# Patient Record
Sex: MALE | Race: WHITE
[De-identification: names, ages, dates, MRNs, and addresses within clinical notes are randomized per-mention and may not be internally consistent; named-entity substitution may affect disease eponyms.]

---

## 2017-01-31 ENCOUNTER — HOSPITAL ENCOUNTER (OUTPATIENT)
Dept: HOSPITAL 47 - LABWHC1 | Age: 52
Discharge: HOME | End: 2017-01-31
Payer: COMMERCIAL

## 2017-01-31 DIAGNOSIS — E29.1: Primary | ICD-10-CM

## 2017-01-31 LAB
CH: 30.1
CHCM: 33.4
ERYTHROCYTE [DISTWIDTH] IN BLOOD BY AUTOMATED COUNT: 5.08 M/UL (ref 4.3–5.9)
ERYTHROCYTE [DISTWIDTH] IN BLOOD: 13 % (ref 11.5–15.5)
HCT VFR BLD AUTO: 46 % (ref 39–53)
HDW: 2.63
HGB BLD-MCNC: 14.7 GM/DL (ref 13–17.5)
MCH RBC QN AUTO: 28.9 PG (ref 25–35)
MCHC RBC AUTO-ENTMCNC: 31.9 G/DL (ref 31–37)
MCV RBC AUTO: 90.6 FL (ref 80–100)
WBC # BLD AUTO: 7.2 K/UL (ref 3.8–10.6)

## 2017-01-31 PROCEDURE — 85027 COMPLETE CBC AUTOMATED: CPT

## 2017-01-31 PROCEDURE — 84403 ASSAY OF TOTAL TESTOSTERONE: CPT

## 2017-01-31 PROCEDURE — 36415 COLL VENOUS BLD VENIPUNCTURE: CPT

## 2017-06-29 ENCOUNTER — HOSPITAL ENCOUNTER (OUTPATIENT)
Dept: HOSPITAL 47 - LABWHC1 | Age: 52
Discharge: HOME | End: 2017-06-29
Payer: COMMERCIAL

## 2017-06-29 DIAGNOSIS — E29.1: Primary | ICD-10-CM

## 2017-06-29 LAB
CH: 30.2
CHCM: 34.3
ERYTHROCYTE [DISTWIDTH] IN BLOOD BY AUTOMATED COUNT: 4.92 M/UL (ref 4.3–5.9)
ERYTHROCYTE [DISTWIDTH] IN BLOOD: 13.5 % (ref 11.5–15.5)
HCT VFR BLD AUTO: 43.6 % (ref 39–53)
HDW: 2.63
HGB BLD-MCNC: 15 GM/DL (ref 13–17.5)
MCH RBC QN AUTO: 30.4 PG (ref 25–35)
MCHC RBC AUTO-ENTMCNC: 34.3 G/DL (ref 31–37)
MCV RBC AUTO: 88.7 FL (ref 80–100)
PSA SERPL-MCNC: 0.61 NG/ML (ref 0–4)
WBC # BLD AUTO: 4.9 K/UL (ref 3.8–10.6)

## 2017-06-29 PROCEDURE — 36415 COLL VENOUS BLD VENIPUNCTURE: CPT

## 2017-06-29 PROCEDURE — 85027 COMPLETE CBC AUTOMATED: CPT

## 2017-06-29 PROCEDURE — 84403 ASSAY OF TOTAL TESTOSTERONE: CPT

## 2017-06-29 PROCEDURE — 84153 ASSAY OF PSA TOTAL: CPT

## 2018-04-16 ENCOUNTER — HOSPITAL ENCOUNTER (OUTPATIENT)
Dept: HOSPITAL 47 - LABWHC1 | Age: 53
Discharge: HOME | End: 2018-04-16
Payer: COMMERCIAL

## 2018-04-16 DIAGNOSIS — E29.1: Primary | ICD-10-CM

## 2018-04-16 LAB
ERYTHROCYTE [DISTWIDTH] IN BLOOD BY AUTOMATED COUNT: 5.04 M/UL (ref 4.3–5.9)
ERYTHROCYTE [DISTWIDTH] IN BLOOD: 13.1 % (ref 11.5–15.5)
HCT VFR BLD AUTO: 45.4 % (ref 39–53)
HGB BLD-MCNC: 15.5 GM/DL (ref 13–17.5)
MCH RBC QN AUTO: 30.8 PG (ref 25–35)
MCHC RBC AUTO-ENTMCNC: 34.1 G/DL (ref 31–37)
MCV RBC AUTO: 90.2 FL (ref 80–100)
PLATELET # BLD AUTO: 245 K/UL (ref 150–450)
WBC # BLD AUTO: 6.9 K/UL (ref 3.8–10.6)

## 2018-04-16 PROCEDURE — 84153 ASSAY OF PSA TOTAL: CPT

## 2018-04-16 PROCEDURE — 85027 COMPLETE CBC AUTOMATED: CPT

## 2018-04-16 PROCEDURE — 36415 COLL VENOUS BLD VENIPUNCTURE: CPT

## 2018-04-16 PROCEDURE — 84403 ASSAY OF TOTAL TESTOSTERONE: CPT

## 2018-07-19 ENCOUNTER — HOSPITAL ENCOUNTER (OUTPATIENT)
Dept: HOSPITAL 47 - LABWHC1 | Age: 53
Discharge: HOME | End: 2018-07-19
Payer: COMMERCIAL

## 2018-07-19 DIAGNOSIS — E29.1: Primary | ICD-10-CM

## 2018-07-19 LAB
ERYTHROCYTE [DISTWIDTH] IN BLOOD BY AUTOMATED COUNT: 4.37 M/UL (ref 4.3–5.9)
ERYTHROCYTE [DISTWIDTH] IN BLOOD: 14.4 % (ref 11.5–15.5)
HCT VFR BLD AUTO: 40.2 % (ref 39–53)
HGB BLD-MCNC: 13.4 GM/DL (ref 13–17.5)
MCH RBC QN AUTO: 30.6 PG (ref 25–35)
MCHC RBC AUTO-ENTMCNC: 33.3 G/DL (ref 31–37)
MCV RBC AUTO: 92.1 FL (ref 80–100)
PLATELET # BLD AUTO: 194 K/UL (ref 150–450)
WBC # BLD AUTO: 5 K/UL (ref 3.8–10.6)

## 2018-07-19 PROCEDURE — 85027 COMPLETE CBC AUTOMATED: CPT

## 2018-07-19 PROCEDURE — 36415 COLL VENOUS BLD VENIPUNCTURE: CPT

## 2018-07-19 PROCEDURE — 84403 ASSAY OF TOTAL TESTOSTERONE: CPT

## 2018-07-19 PROCEDURE — 84153 ASSAY OF PSA TOTAL: CPT

## 2018-12-11 ENCOUNTER — HOSPITAL ENCOUNTER (OUTPATIENT)
Dept: HOSPITAL 47 - LABWHC1 | Age: 53
Discharge: HOME | End: 2018-12-11
Payer: COMMERCIAL

## 2018-12-11 DIAGNOSIS — E29.1: Primary | ICD-10-CM

## 2018-12-11 LAB
ERYTHROCYTE [DISTWIDTH] IN BLOOD BY AUTOMATED COUNT: 5.17 M/UL (ref 4.3–5.9)
ERYTHROCYTE [DISTWIDTH] IN BLOOD: 13.4 % (ref 11.5–15.5)
HCT VFR BLD AUTO: 48 % (ref 39–53)
HGB BLD-MCNC: 15.3 GM/DL (ref 13–17.5)
MCH RBC QN AUTO: 29.5 PG (ref 25–35)
MCHC RBC AUTO-ENTMCNC: 31.8 G/DL (ref 31–37)
MCV RBC AUTO: 92.7 FL (ref 80–100)
PLATELET # BLD AUTO: 245 K/UL (ref 150–450)
WBC # BLD AUTO: 8.1 K/UL (ref 3.8–10.6)

## 2018-12-11 PROCEDURE — 84403 ASSAY OF TOTAL TESTOSTERONE: CPT

## 2018-12-11 PROCEDURE — 85027 COMPLETE CBC AUTOMATED: CPT

## 2018-12-11 PROCEDURE — 36415 COLL VENOUS BLD VENIPUNCTURE: CPT

## 2019-03-28 ENCOUNTER — HOSPITAL ENCOUNTER (OUTPATIENT)
Dept: HOSPITAL 47 - LABWHC1 | Age: 54
Discharge: HOME | End: 2019-03-28
Attending: INTERNAL MEDICINE
Payer: COMMERCIAL

## 2019-03-28 DIAGNOSIS — E29.1: Primary | ICD-10-CM

## 2019-03-28 LAB
ERYTHROCYTE [DISTWIDTH] IN BLOOD BY AUTOMATED COUNT: 6.11 M/UL (ref 4.3–5.9)
ERYTHROCYTE [DISTWIDTH] IN BLOOD: 14.5 % (ref 11.5–15.5)
HCT VFR BLD AUTO: 53.2 % (ref 39–53)
HGB BLD-MCNC: 16.9 GM/DL (ref 13–17.5)
MCH RBC QN AUTO: 27.6 PG (ref 25–35)
MCHC RBC AUTO-ENTMCNC: 31.7 G/DL (ref 31–37)
MCV RBC AUTO: 87 FL (ref 80–100)
PLATELET # BLD AUTO: 267 K/UL (ref 150–450)
WBC # BLD AUTO: 7 K/UL (ref 3.8–10.6)

## 2019-03-28 PROCEDURE — 84403 ASSAY OF TOTAL TESTOSTERONE: CPT

## 2019-03-28 PROCEDURE — 36415 COLL VENOUS BLD VENIPUNCTURE: CPT

## 2019-03-28 PROCEDURE — 85027 COMPLETE CBC AUTOMATED: CPT

## 2021-07-06 ENCOUNTER — HOSPITAL ENCOUNTER (OUTPATIENT)
Dept: HOSPITAL 47 - LABWHC1 | Age: 56
Discharge: HOME | End: 2021-07-06
Attending: FAMILY MEDICINE
Payer: COMMERCIAL

## 2021-07-06 DIAGNOSIS — E29.1: ICD-10-CM

## 2021-07-06 DIAGNOSIS — Z00.00: Primary | ICD-10-CM

## 2021-07-06 DIAGNOSIS — Z12.5: ICD-10-CM

## 2021-07-06 DIAGNOSIS — E55.9: ICD-10-CM

## 2021-07-06 LAB
BASOPHILS # BLD AUTO: 0.08 X 10*3/UL (ref 0–0.1)
BASOPHILS NFR BLD AUTO: 1.2 %
EOSINOPHIL # BLD AUTO: 0.25 X 10*3/UL (ref 0.04–0.35)
EOSINOPHIL NFR BLD AUTO: 3.7 %
ERYTHROCYTE [DISTWIDTH] IN BLOOD BY AUTOMATED COUNT: 4.35 X 10*6/UL (ref 4.4–5.6)
ERYTHROCYTE [DISTWIDTH] IN BLOOD: 13.7 % (ref 11.5–14.5)
HCT VFR BLD AUTO: 39.6 % (ref 39.6–50)
HGB BLD-MCNC: 13.3 G/DL (ref 13–17)
LYMPHOCYTES # SPEC AUTO: 2.72 X 10*3/UL (ref 0.9–5)
LYMPHOCYTES NFR SPEC AUTO: 40.1 %
MCH RBC QN AUTO: 30.6 PG (ref 27–32)
MCHC RBC AUTO-ENTMCNC: 33.6 G/DL (ref 32–37)
MCV RBC AUTO: 91 FL (ref 80–97)
MONOCYTES # BLD AUTO: 0.53 X 10*3/UL (ref 0.2–1)
MONOCYTES NFR BLD AUTO: 7.8 %
NEUTROPHILS # BLD AUTO: 3.19 X 10*3/UL (ref 1.8–7.7)
NEUTROPHILS NFR BLD AUTO: 47.1 %
PLATELET # BLD AUTO: 193 X 10*3/UL (ref 140–440)
WBC # BLD AUTO: 6.78 X 10*3/UL (ref 4.5–10)

## 2021-07-06 PROCEDURE — 82306 VITAMIN D 25 HYDROXY: CPT

## 2021-07-06 PROCEDURE — 84153 ASSAY OF PSA TOTAL: CPT

## 2021-07-06 PROCEDURE — 84443 ASSAY THYROID STIM HORMONE: CPT

## 2021-07-06 PROCEDURE — 80053 COMPREHEN METABOLIC PANEL: CPT

## 2021-07-06 PROCEDURE — 36415 COLL VENOUS BLD VENIPUNCTURE: CPT

## 2021-07-06 PROCEDURE — 80061 LIPID PANEL: CPT

## 2021-07-06 PROCEDURE — 85025 COMPLETE CBC W/AUTO DIFF WBC: CPT

## 2021-07-07 LAB
ALBUMIN SERPL-MCNC: 4.2 G/DL (ref 3.8–4.9)
ALBUMIN/GLOB SERPL: 1.62 G/DL (ref 1.6–3.17)
ALP SERPL-CCNC: 86 U/L (ref 41–126)
ALT SERPL-CCNC: 20 U/L (ref 10–49)
ANION GAP SERPL CALC-SCNC: 9.7 MMOL/L (ref 4–12)
AST SERPL-CCNC: 20 U/L (ref 14–35)
BUN SERPL-SCNC: 16 MG/DL (ref 9–27)
BUN/CREAT SERPL: 13.33 RATIO (ref 12–20)
CALCIUM SPEC-MCNC: 8.6 MG/DL (ref 8.7–10.3)
CHLORIDE SERPL-SCNC: 110 MMOL/L (ref 96–109)
CHOLEST SERPL-MCNC: 162 MG/DL (ref 0–200)
CO2 SERPL-SCNC: 21.3 MMOL/L (ref 21.6–31.8)
GLOBULIN SER CALC-MCNC: 2.6 G/DL (ref 1.6–3.3)
GLUCOSE SERPL-MCNC: 88 MG/DL (ref 70–110)
HDLC SERPL-MCNC: 57 MG/DL (ref 40–60)
LDLC SERPL CALC-MCNC: 86.4 MG/DL (ref 0–131)
POTASSIUM SERPL-SCNC: 4 MMOL/L (ref 3.5–5.5)
PROT SERPL-MCNC: 6.8 G/DL (ref 6.2–8.2)
PSA SERPL-MCNC: 0.2 NG/ML (ref 0–3.5)
SODIUM SERPL-SCNC: 141 MMOL/L (ref 135–145)
TRIGL SERPL-MCNC: 93 MG/DL (ref 0–149)
VLDLC SERPL CALC-MCNC: 18.6 MG/DL (ref 5–40)

## 2021-09-21 ENCOUNTER — HOSPITAL ENCOUNTER (OUTPATIENT)
Dept: HOSPITAL 47 - LABWHC1 | Age: 56
Discharge: HOME | End: 2021-09-21
Attending: FAMILY MEDICINE
Payer: COMMERCIAL

## 2021-09-21 DIAGNOSIS — E03.9: Primary | ICD-10-CM

## 2021-09-21 PROCEDURE — 36415 COLL VENOUS BLD VENIPUNCTURE: CPT

## 2021-09-21 PROCEDURE — 84443 ASSAY THYROID STIM HORMONE: CPT

## 2021-12-07 ENCOUNTER — HOSPITAL ENCOUNTER (EMERGENCY)
Dept: HOSPITAL 47 - EC | Age: 56
LOS: 1 days | Discharge: HOME | End: 2021-12-08
Payer: COMMERCIAL

## 2021-12-07 DIAGNOSIS — U07.1: Primary | ICD-10-CM

## 2021-12-07 PROCEDURE — 99284 EMERGENCY DEPT VISIT MOD MDM: CPT

## 2021-12-07 PROCEDURE — 71046 X-RAY EXAM CHEST 2 VIEWS: CPT

## 2021-12-07 PROCEDURE — 87635 SARS-COV-2 COVID-19 AMP PRB: CPT

## 2021-12-08 ENCOUNTER — HOSPITAL ENCOUNTER (INPATIENT)
Dept: HOSPITAL 47 - EC | Age: 56
LOS: 19 days | DRG: 207 | End: 2021-12-27
Attending: INTERNAL MEDICINE | Admitting: INTERNAL MEDICINE
Payer: COMMERCIAL

## 2021-12-08 VITALS — BODY MASS INDEX: 37.3 KG/M2

## 2021-12-08 DIAGNOSIS — F32.A: ICD-10-CM

## 2021-12-08 DIAGNOSIS — Z98.890: ICD-10-CM

## 2021-12-08 DIAGNOSIS — Z82.49: ICD-10-CM

## 2021-12-08 DIAGNOSIS — T38.0X5A: ICD-10-CM

## 2021-12-08 DIAGNOSIS — Z66: ICD-10-CM

## 2021-12-08 DIAGNOSIS — E87.5: ICD-10-CM

## 2021-12-08 DIAGNOSIS — Z82.5: ICD-10-CM

## 2021-12-08 DIAGNOSIS — I48.0: ICD-10-CM

## 2021-12-08 DIAGNOSIS — N17.9: ICD-10-CM

## 2021-12-08 DIAGNOSIS — Z79.890: ICD-10-CM

## 2021-12-08 DIAGNOSIS — J12.82: ICD-10-CM

## 2021-12-08 DIAGNOSIS — J80: ICD-10-CM

## 2021-12-08 DIAGNOSIS — E66.9: ICD-10-CM

## 2021-12-08 DIAGNOSIS — J15.9: ICD-10-CM

## 2021-12-08 DIAGNOSIS — R29.810: ICD-10-CM

## 2021-12-08 DIAGNOSIS — I12.9: ICD-10-CM

## 2021-12-08 DIAGNOSIS — Z86.69: ICD-10-CM

## 2021-12-08 DIAGNOSIS — J98.2: ICD-10-CM

## 2021-12-08 DIAGNOSIS — G47.00: ICD-10-CM

## 2021-12-08 DIAGNOSIS — R73.9: ICD-10-CM

## 2021-12-08 DIAGNOSIS — U07.1: Primary | ICD-10-CM

## 2021-12-08 DIAGNOSIS — E03.9: ICD-10-CM

## 2021-12-08 DIAGNOSIS — Z96.652: ICD-10-CM

## 2021-12-08 DIAGNOSIS — Z79.899: ICD-10-CM

## 2021-12-08 DIAGNOSIS — Z77.22: ICD-10-CM

## 2021-12-08 DIAGNOSIS — N18.30: ICD-10-CM

## 2021-12-08 DIAGNOSIS — Z87.39: ICD-10-CM

## 2021-12-08 DIAGNOSIS — R74.01: ICD-10-CM

## 2021-12-08 DIAGNOSIS — Z71.3: ICD-10-CM

## 2021-12-08 DIAGNOSIS — K59.00: ICD-10-CM

## 2021-12-08 DIAGNOSIS — M79.7: ICD-10-CM

## 2021-12-08 DIAGNOSIS — Z51.5: ICD-10-CM

## 2021-12-08 PROCEDURE — 85379 FIBRIN DEGRADATION QUANT: CPT

## 2021-12-08 PROCEDURE — 36600 WITHDRAWAL OF ARTERIAL BLOOD: CPT

## 2021-12-08 PROCEDURE — 83880 ASSAY OF NATRIURETIC PEPTIDE: CPT

## 2021-12-08 PROCEDURE — 80053 COMPREHEN METABOLIC PANEL: CPT

## 2021-12-08 PROCEDURE — 83735 ASSAY OF MAGNESIUM: CPT

## 2021-12-08 PROCEDURE — 87070 CULTURE OTHR SPECIMN AEROBIC: CPT

## 2021-12-08 PROCEDURE — 87077 CULTURE AEROBIC IDENTIFY: CPT

## 2021-12-08 PROCEDURE — 83036 HEMOGLOBIN GLYCOSYLATED A1C: CPT

## 2021-12-08 PROCEDURE — 96374 THER/PROPH/DIAG INJ IV PUSH: CPT

## 2021-12-08 PROCEDURE — 83615 LACTATE (LD) (LDH) ENZYME: CPT

## 2021-12-08 PROCEDURE — 84145 PROCALCITONIN (PCT): CPT

## 2021-12-08 PROCEDURE — 94640 AIRWAY INHALATION TREATMENT: CPT

## 2021-12-08 PROCEDURE — 87205 SMEAR GRAM STAIN: CPT

## 2021-12-08 PROCEDURE — 94003 VENT MGMT INPAT SUBQ DAY: CPT

## 2021-12-08 PROCEDURE — 71045 X-RAY EXAM CHEST 1 VIEW: CPT

## 2021-12-08 PROCEDURE — 36415 COLL VENOUS BLD VENIPUNCTURE: CPT

## 2021-12-08 PROCEDURE — 86140 C-REACTIVE PROTEIN: CPT

## 2021-12-08 PROCEDURE — 82565 ASSAY OF CREATININE: CPT

## 2021-12-08 PROCEDURE — 94660 CPAP INITIATION&MGMT: CPT

## 2021-12-08 PROCEDURE — 94760 N-INVAS EAR/PLS OXIMETRY 1: CPT

## 2021-12-08 PROCEDURE — 82728 ASSAY OF FERRITIN: CPT

## 2021-12-08 PROCEDURE — 81003 URINALYSIS AUTO W/O SCOPE: CPT

## 2021-12-08 PROCEDURE — 87040 BLOOD CULTURE FOR BACTERIA: CPT

## 2021-12-08 PROCEDURE — 85025 COMPLETE CBC W/AUTO DIFF WBC: CPT

## 2021-12-08 PROCEDURE — 87186 SC STD MICRODIL/AGAR DIL: CPT

## 2021-12-08 PROCEDURE — 84132 ASSAY OF SERUM POTASSIUM: CPT

## 2021-12-08 PROCEDURE — 93970 EXTREMITY STUDY: CPT

## 2021-12-08 PROCEDURE — 71275 CT ANGIOGRAPHY CHEST: CPT

## 2021-12-08 PROCEDURE — 93005 ELECTROCARDIOGRAM TRACING: CPT

## 2021-12-08 PROCEDURE — 99285 EMERGENCY DEPT VISIT HI MDM: CPT

## 2021-12-08 PROCEDURE — 96376 TX/PRO/DX INJ SAME DRUG ADON: CPT

## 2021-12-08 PROCEDURE — 82805 BLOOD GASES W/O2 SATURATION: CPT

## 2021-12-08 NOTE — XR
EXAM:

  XR Chest, 2 Views

 

CLINICAL HISTORY:

  Cough

 

TECHNIQUE:

  Frontal and lateral views of the chest.

 

COMPARISON:

  No relevant prior studies available.

 

FINDINGS:

  Lungs:  Low lung volumes.  Patchy airspace disease bilaterally, 

especially in the peripheral mid to lower lungs bilaterally.

  Pleural space:  Unremarkable.  No pneumothorax.

  Heart:  Unremarkable.  No cardiomegaly.

  Mediastinum:  Unremarkable.

  Bones/joints:  Unremarkable.

 

IMPRESSION:

  Low lung volumes.  Patchy airspace disease bilaterally, especially in 

the peripheral mid to lower lungs bilaterally.  Likely represents 

infectious/inflammatory process, including Covid 19.  Consider follow-up.

## 2021-12-09 LAB
ALBUMIN SERPL-MCNC: 3.3 G/DL (ref 3.5–5)
ALP SERPL-CCNC: 225 U/L (ref 38–126)
ALT SERPL-CCNC: 80 U/L (ref 4–49)
ANION GAP SERPL CALC-SCNC: 11 MMOL/L
AST SERPL-CCNC: 89 U/L (ref 17–59)
BASOPHILS # BLD AUTO: 0 K/UL (ref 0–0.2)
BASOPHILS NFR BLD AUTO: 0 %
BUN SERPL-SCNC: 16 MG/DL (ref 9–20)
CALCIUM SPEC-MCNC: 8.7 MG/DL (ref 8.4–10.2)
CHLORIDE SERPL-SCNC: 103 MMOL/L (ref 98–107)
CO2 SERPL-SCNC: 21 MMOL/L (ref 22–30)
EOSINOPHIL # BLD AUTO: 0 K/UL (ref 0–0.7)
EOSINOPHIL NFR BLD AUTO: 0 %
ERYTHROCYTE [DISTWIDTH] IN BLOOD BY AUTOMATED COUNT: 4.45 M/UL (ref 4.3–5.9)
ERYTHROCYTE [DISTWIDTH] IN BLOOD: 13.9 % (ref 11.5–15.5)
FERRITIN SERPL-MCNC: 2822 NG/ML (ref 22–322)
GLUCOSE BLD-MCNC: 126 MG/DL (ref 75–99)
GLUCOSE BLD-MCNC: 141 MG/DL (ref 75–99)
GLUCOSE SERPL-MCNC: 187 MG/DL (ref 74–99)
HCT VFR BLD AUTO: 41.8 % (ref 39–53)
HGB BLD-MCNC: 13.9 GM/DL (ref 13–17.5)
LDH SPEC-CCNC: 1091 U/L (ref 313–618)
LYMPHOCYTES # SPEC AUTO: 0.3 K/UL (ref 1–4.8)
LYMPHOCYTES NFR SPEC AUTO: 5 %
MCH RBC QN AUTO: 31.2 PG (ref 25–35)
MCHC RBC AUTO-ENTMCNC: 33.2 G/DL (ref 31–37)
MCV RBC AUTO: 93.9 FL (ref 80–100)
MONOCYTES # BLD AUTO: 0.1 K/UL (ref 0–1)
MONOCYTES NFR BLD AUTO: 2 %
NEUTROPHILS # BLD AUTO: 6.1 K/UL (ref 1.3–7.7)
NEUTROPHILS NFR BLD AUTO: 92 %
PLATELET # BLD AUTO: 230 K/UL (ref 150–450)
POTASSIUM SERPL-SCNC: 4.1 MMOL/L (ref 3.5–5.1)
PROT SERPL-MCNC: 6.6 G/DL (ref 6.3–8.2)
SODIUM SERPL-SCNC: 135 MMOL/L (ref 137–145)
WBC # BLD AUTO: 6.6 K/UL (ref 3.8–10.6)

## 2021-12-09 PROCEDURE — 5A0955A ASSISTANCE WITH RESPIRATORY VENTILATION, GREATER THAN 96 CONSECUTIVE HOURS, HIGH NASAL FLOW/VELOCITY: ICD-10-PCS

## 2021-12-09 RX ADMIN — ALBUTEROL SULFATE SCH PUFF: 90 AEROSOL, METERED RESPIRATORY (INHALATION) at 11:53

## 2021-12-09 RX ADMIN — Medication SCH MCG: at 12:54

## 2021-12-09 RX ADMIN — Medication SCH MG: at 12:54

## 2021-12-09 RX ADMIN — INSULIN ASPART SCH: 100 INJECTION, SOLUTION INTRAVENOUS; SUBCUTANEOUS at 18:19

## 2021-12-09 RX ADMIN — GABAPENTIN SCH MG: 400 CAPSULE ORAL at 16:46

## 2021-12-09 RX ADMIN — OXYCODONE HYDROCHLORIDE AND ACETAMINOPHEN SCH MG: 500 TABLET ORAL at 12:54

## 2021-12-09 RX ADMIN — ENOXAPARIN SODIUM SCH MG: 40 INJECTION SUBCUTANEOUS at 21:09

## 2021-12-09 RX ADMIN — GABAPENTIN SCH MG: 400 CAPSULE ORAL at 21:08

## 2021-12-09 RX ADMIN — DEXTROSE SCH MG: 50 INJECTION, SOLUTION INTRAVENOUS at 21:08

## 2021-12-09 RX ADMIN — INSULIN ASPART SCH: 100 INJECTION, SOLUTION INTRAVENOUS; SUBCUTANEOUS at 21:22

## 2021-12-09 RX ADMIN — ALBUTEROL SULFATE SCH PUFF: 90 AEROSOL, METERED RESPIRATORY (INHALATION) at 20:51

## 2021-12-09 NOTE — XR
EXAMINATION TYPE: XR chest 1V portable

 

DATE OF EXAM: 12/8/2021

 

COMPARISON: 12/8/2021

 

HISTORY: Short of breath

 

TECHNIQUE:

 

FINDINGS: There is patchy bilateral pulmonary interstitial and airspace infiltrates. Heart size is no
rmal. There is no definite pleural effusion. Mediastinum is normal

 

IMPRESSION: Bilateral patchy pneumonia with a changing pattern compared to exam earlier today.

## 2021-12-09 NOTE — P.HPIM
History of Present Illness


H&P Date: 21








History of present illness


56-year-old  male with past medical history of fibromyalgia comes in to

emergency room with symptoms of nausea, vomiting, dizziness and sweating feeling

weak in with runny nose loss of taste and hence now for the past 1 week.  He 

presented on  was found to have positive COVID results.  Received his 

monoclonal antibodies.  He Came back to the emergency room because of worsening 

of symptoms.Vitals reviewed patient afebrile pulse 80 respiratory rate 20 blood 

pressure 138/82 saturating at 88% on 10 L labs reviewed WBC 6.6 hemoglobin 13.9 

d-dimer is elevated at 0.89, sodium 135 bicarb 21 BUN 16 creatinine 1.3 glucose 

187 ferritin 2822 AST 89 ALT 80 alkaline phosphatase 225 LDH 1091 and CRP 21 and

albumin 3.3


X-ray suggestive of bilateral patchy pneumonia.  Both interstitial and airspace 

infiltrate


Pulmonary clinic consulted for COVID pneumonia


Patient placed on Lovenox 40 subcu twice a day, vitamin C, D and zinc.  

Dexamethasone initiated at 6 mg twice a day IV


Patient is a candidate of Baritinib and will discuss about initiating it with 

pulmonary


Venous Doppler ordered








ROS


Constitutional: Endorses chills, fever, malaise, nausea and vomiting


Eyes: denies decreased vision, denies diplopia, denies discharge, denies pain


Ears: deny: decreased hearing


Ears, nose, mouth and throat: Denies dental pain, Denies headache, Denies nasal 

discharge, Denies nose pain


Cardiovascular: Denies chest pain, Denies decreased exercise tolerance, Denies 

edema, Denies high blood pressure, Denies irregular heart beat, Denies 

palpitations, Denies paroxysmal nocturnal dyspnea, Denies rapid heart beat, 

Denies shortness of breath


Respiratory: Denies congestion, Denies cough, Denies cough with sputum, Denies 

dyspnea, Denies home oxygen, Denies wheezing


Gastrointestinal: Denies abdominal pain, Denies change in bowel habits, Denies 

coffee ground emesis, Denies early satiety, Denies excessive gas, Denies 

heartburn, Denies hematemesis, Denies hematochezia, Denies loss of appetite, 

endorses nausea and vomiting


Genitourinary: Denies dysuria, Denies flank pain, Denies kidney stones, Denies 

menorrhagia, Denies urgency, Denies urinary frequency


Musculoskeletal: Denies gait dysfunction, Denies limitation of motion, Denies 

morning stiffness, Denies muscle cramps


Integumentary: Denies rash, Denies wounds, Denies brittle nails, Denies change 

in hair/nails, Denies darkening of skin


Neurological: Denies balance difficulties, Denies change in speech, Denies 

double vision, Denies gait dysfunction, Denies loss of vision, Denies motor 

disturbance, Denies numbness, Denies paralysis, Denies paresthesias, Denies 

seizures


Psychiatric: Denies anxiety, Denies depression


Endocrine: Denies excessive sweating, Denies excessive thirst, Denies high blood

sugars, Denies palpitations


Hematologic/Lymphatic: Denies easy bruising, Denies lymphadenopathy





Social history


Nonsmoker, passive smoking through his son


Nondrinker


No marijuana use








Family history


Mother  due to COPD


Father  of heart disease


Has 5 siblings with no significant medical disease


Has 4 children who with one son who lives with himsignificant medical problems








Physical exam





- Constitutional


General appearance: cooperative, no acute distress, obese appears anxious





- EENT


Eyes: anicteric sclerae, PERRLA, normal appearance


ENT: hearing grossly normal





- Neck


Neck: no lymphadenopathy, normal ROM, no other, no rigidity, no stridor, no 

thyromegaly





- Respiratory


Respiratory: bilateral decreased air entry bilaterally





- Cardiovascular


Rhythm: regular


Heart sounds: normal: S1, S2


Abnormal Heart Sounds: no systolic murmur, no diastolic murmur, no rub, no S3 

Gallop, no S4 Gallop, no click, no other





- Gastrointestinal


General gastrointestinal: normal bowel sounds, soft





- Integumentary


Integumentary: no rash





- Neurologic


Neurologic: CNII-XII intact





- Musculoskeletal


Musculoskeletal: gait normal, strength equal bilaterally





- Psychiatric


Psychiatric: A&O x's 3, appropriate affect











Assessment and plan





Acute hypoxic respiratory failure


Acute COVID pneumonia





- Supplement oxygen to keep SpO2 more than 90%


- Dexamethasone 6 mg IV twice a day


- Lovenox 40 subcu twice a day


- Rule out pulmonary embolism and DVT


- Venous Doppler ordered as patient's creatinine slightly elevated and is not a 

candidate for CT


- We will discuss with pulmonary if patient would be a candidate for Baricitinib


-We will order inflammatory markers for tomorrow


-Pulmonary consulted


- Albuterol inhaler as needed


- Symbicort twice daily


- Continue supplements vitamin C, D and zinc





Acute transaminitis


- Secondary to viral inflammation


- Continue CMP monitoring





CK D3


- Continue to monitor patient's creatinine


- No acute kidney injury





History of fibromyalgia


- Continue gabapentin 800 3 times a day


Citalopram 40 mg by mouth daily


Tramadol 100 mg every 6 hours as needed








Insomnia


-Continue Eszopiclone 3 mg daily at bedtime 





Hypothyroidism


- Levothyroxine 50 g by mouth daily





DVT prophylaxis


Lovenox 40 subcu twice a day





GI prophylaxis


2.  Pepcid 20 mg by mouth daily








 





Past Medical History


Past Medical History: Fibromyalgia


Additional Past Medical History / Comment(s): insomnia


History of Any Multi-Drug Resistant Organisms: None Reported


Past Surgical History: Orthopedic Surgery


Additional Past Surgical History / Comment(s): LEFT ACL REPAIR.


Past Anesthesia/Blood Transfusion Reactions: No Reported Reaction


Past Psychological History: Depression


Smoking Status: Never smoker


Past Alcohol Use History: None Reported


Past Drug Use History: None Reported





- Past Family History


  ** Father


Family Medical History: Coronary Artery Disease (CAD), Hypertension





Medications and Allergies


                                Home Medications











 Medication  Instructions  Recorded  Confirmed  Type


 


Citalopram Hydrobromide 40 mg PO DAILY 21 History


 


Eszopiclone 3 mg PO HS PRN 21 History


 


Gabapentin 800 mg PO TID 21 History


 


Levothyroxine Sodium [Synthroid] 50 mcg PO DAILY 21 History


 


traMADol HCl [Ultram] 100 mg PO Q6HR PRN 21 History








                                    Allergies











Allergy/AdvReac Type Severity Reaction Status Date / Time


 


No Known Allergies Allergy   Verified 21 07:18














Physical Exam


Vitals: 


                                   Vital Signs











  Temp Pulse Pulse Resp BP BP Pulse Ox


 


 21 13:16  97.9 F   80  20   138/82  88 L


 


 21 10:08    74    124/70  87 L


 


 21 08:40        90 L


 


 21 03:50  97.3 F L  80   16  118/74   90 L


 


 21 02:02        85 L


 


 21 01:03        84 L


 


 21 23:35        85 L


 


 21 23:31  97.6 F  80   18  133/82   78 L








                                Intake and Output











 21





 22:59 06:59 14:59


 


Other:   


 


  Voiding Method   Urinal


 


  Weight  111.13 kg 














Results


CBC & Chem 7: 


                                 21 23:57





                                 21 23:57


Labs: 


                  Abnormal Lab Results - Last 24 Hours (Table)











  21 Range/Units





  23:57 23:57 11:42 


 


Lymphocytes #  0.3 L    (1.0-4.8)  k/uL


 


D-Dimer    0.89 H  (<0.60)  mg/L FEU


 


Sodium   135 L   (137-145)  mmol/L


 


Carbon Dioxide   21 L   (22-30)  mmol/L


 


Creatinine   1.31 H   (0.66-1.25)  mg/dL


 


Glucose   187 H   (74-99)  mg/dL


 


Ferritin   2822.0 H   (22.0-322.0)  ng/mL


 


AST   89 H   (17-59)  U/L


 


ALT   80 H   (4-49)  U/L


 


Alkaline Phosphatase   225 H   ()  U/L


 


Lactate Dehydrogenase   1091 H   (313-618)  U/L


 


C-Reactive Protein   21.0 H   (<1.0)  mg/dL


 


Albumin   3.3 L   (3.5-5.0)  g/dL

## 2021-12-09 NOTE — US
EXAMINATION TYPE: US venous doppler duplex LE BI

 

DATE OF EXAM: 12/9/2021 12:27 PM

 

COMPARISON: NONE

 

CLINICAL HISTORY: DVT, hypoxia.

 

SIDE PERFORMED: Bilateral  

 

TECHNIQUE:  The lower extremity deep venous system is examined utilizing real time linear array sonog
marielos with graded compression, doppler sonography and color-flow sonography.

 

VESSELS IMAGED:

Common Femoral Vein

Deep Femoral Vein

Greater Saphenous Vein *

Femoral Vein

Popliteal Vein

Small Saphenous Vein *

Proximal Calf Veins

(* superficial vessels)

 

There is normal flow, compressibility, vascular waveforms.

 

Right Leg:  Negative for DVT

 

Left Leg:  Negative for DVT

 

 

 

IMPRESSION: No evident deep venous thrombosis within the lower extremities from the level of the knee
 centrally

## 2021-12-09 NOTE — P.CNPUL
History of Present Illness


Consult date: 12/09/21


Requesting physician: Cy Carrillo


Reason for consult: dyspnea, hypoxemia, abnormal CXR/CT


Chief complaint: Shortness of breath, cough, congestion


History of present illness: 





This is a very pleasant 56-year-old male patient with a known history of 

fibromyalgia.  Nonsmoker.  Approximate 1 week ago he developed symptoms of 

nausea vomiting diarrhea dizziness and sweating.  He presented here on 

12/07/2021 and has a positive for COVID-19 and received monoclonal antibodies.  

He came back to the emergency room last evening with worsening symptoms.  Chest 

x-ray reveals bilateral patchy interstitial and airspace infiltrates.  He is 

currently requiring 10 L high flow nasal cannula to maintain O2 saturations in 

the high 80s low 90s.  White count 6.6.  Hemoglobin 13.9.  Lymphocytes 0.3.  

Sodium 135.  Potassium 4.1.  Bicarb 21.  Creatinine 1.31.  Glucose 187.  

Ferritin 2822.  AST 89.  ALT 80.  LDH 1091.  C-reactive protein 21.0.  Initiated

on Decadron. 





Review of Systems





REVIEW OF SYSTEMS:


CONSTITUTIONAL: Denies any recent significant weight loss or weight gain.


EYES: Denies change in vision.


EARS, NOSE, MOUTH, THROAT: Denies headaches, denies sore throat.


CARDIOVASCULAR: Denies chest pain, palpitations or syncopal episodes.


RESPIRATORY: Positive for shortness of breath, cough, congestion no hemoptysis.


GASTROINTESTINAL: Denies change in appetite, denies abdominal pain


GENITOURINARY: Denies hematuria, denies infections.


MUSKULOSKELETAL: Denies pain, denies swelling.


INTEGUMENTARY: Denies rash, denies eczema.


NEUROLOGICAL: Denies recent memory loss, no recent seizure activity. 


PSYCHIATRIC: Denies anxiety, denies depression.


HEMATOLOGIC/LYMPHATIC: Denies anemia, denies enlarged lymph nodes.








Past Medical History


Past Medical History: Fibromyalgia


Additional Past Medical History / Comment(s): insomnia


History of Any Multi-Drug Resistant Organisms: None Reported


Past Surgical History: Orthopedic Surgery


Additional Past Surgical History / Comment(s): LEFT ACL REPAIR.


Past Anesthesia/Blood Transfusion Reactions: No Reported Reaction


Past Psychological History: Depression


Smoking Status: Never smoker


Past Alcohol Use History: None Reported


Past Drug Use History: None Reported





- Past Family History


  ** Father


Family Medical History: Coronary Artery Disease (CAD), Hypertension





Medications and Allergies


                                Home Medications











 Medication  Instructions  Recorded  Confirmed  Type


 


Citalopram Hydrobromide 40 mg PO DAILY 12/09/21 12/09/21 History


 


Eszopiclone 3 mg PO HS PRN 12/09/21 12/09/21 History


 


Gabapentin 800 mg PO TID 12/09/21 12/09/21 History


 


Levothyroxine Sodium [Synthroid] 50 mcg PO DAILY 12/09/21 12/09/21 History


 


traMADol HCl [Ultram] 100 mg PO Q6HR PRN 12/09/21 12/09/21 History








                                    Allergies











Allergy/AdvReac Type Severity Reaction Status Date / Time


 


No Known Allergies Allergy   Verified 12/09/21 07:18














Physical Exam


Vitals: 


                                   Vital Signs











  Temp Pulse Pulse Resp BP BP Pulse Ox


 


 12/09/21 10:08    74    124/70  87 L


 


 12/09/21 08:40        90 L


 


 12/09/21 03:50  97.3 F L  80   16  118/74   90 L


 


 12/09/21 02:02        85 L


 


 12/09/21 01:03        84 L


 


 12/08/21 23:35        85 L


 


 12/08/21 23:31  97.6 F  80   18  133/82   78 L








                                Intake and Output











 12/08/21 12/09/21 12/09/21





 22:59 06:59 14:59


 


Other:   


 


  Voiding Method   Urinal


 


  Weight  111.13 kg 














GENERAL EXAM: Alert, pleasant 56-year-old male patient, on 8 L high flow nasal 

cannula, in mild respiratory distress.


HEAD: Normocephalic.


EYES: Normal reaction of pupils, equal size.


NOSE: Clear with pink turbinates.


THROAT: No erythema or exudates.


NECK: No masses, no JVD.


CHEST: No chest wall deformity.


LUNGS: Equal air entry with crackles in the bilateral bases.


CVS: S1 and S2 normal with no audible murmur, regular rhythm.


ABDOMEN: No hepatosplenomegaly, normal bowel sounds, no guarding or rigidity.


SPINE: No scoliosis or deformity


SKIN: No rashes


CENTRAL NERVOUS SYSTEM: No focal deficits, tone is normal in all 4 extremities.


EXTREMITIES: There is no peripheral edema.  No clubbing, no cyanosis.  

Peripheral pulses are intact.





Results





- Laboratory Findings


CBC and BMP: 


                                 12/08/21 23:57





                                 12/08/21 23:57


Abnormal lab findings: 


                                  Abnormal Labs











  12/08/21 12/08/21





  23:57 23:57


 


Lymphocytes #  0.3 L 


 


Sodium   135 L


 


Carbon Dioxide   21 L


 


Creatinine   1.31 H


 


Glucose   187 H


 


Ferritin   2822.0 H


 


AST   89 H


 


ALT   80 H


 


Alkaline Phosphatase   225 H


 


Lactate Dehydrogenase   1091 H


 


C-Reactive Protein   21.0 H


 


Albumin   3.3 L














- Diagnostic Findings


Chest x-ray: image reviewed





Assessment and Plan


Assessment: 





1 Acute hypoxemic respiratory failure secondary to acute COVID-19 pneumonia.  

Not vaccinated.  Received monoclonal antibodies on 12/07/2021.  On 10 L high 

flow nasal cannula.  Does not qualify for Remdesivir.  Not qualifying for 

Baricitinib yet.





2 Elevated inflammatory markers secondary to above





3 Mild transaminitis secondary to above





4 History of fibromyalgia





5 Hypothyroidism





Plan:





The patient was seen and evaluated


Chest x-ray and labs reviewed


Continue Decadron 6 mg daily


Add Lovenox 40 mg daily, vitamin supplements


Obtain a pro-calcitonin, d-dimer


Add bronchodilators


Add incentive spirometer


Follow-up chest x-ray, inflammatory markers in the a.m.


We will continue to follow and make further recommendations based on his 

clinical status





I, the cosigning physician, performed a history & physical examination of the 

patient. Lungs sounds echoes in the bilateral bases.  Maintaining good O2 

saturations in the 90s on 2 L high flow nasal cannula.  I discussed the 

assessment and plan of care with my nurse practitioner, Dolores Chaves. I attest to 

the above consultation as dictated by her.


Time with Patient: Greater than 30

## 2021-12-10 LAB
GLUCOSE BLD-MCNC: 129 MG/DL (ref 75–99)
GLUCOSE BLD-MCNC: 132 MG/DL (ref 75–99)
GLUCOSE BLD-MCNC: 133 MG/DL (ref 75–99)
GLUCOSE BLD-MCNC: 153 MG/DL (ref 75–99)

## 2021-12-10 RX ADMIN — INSULIN ASPART SCH: 100 INJECTION, SOLUTION INTRAVENOUS; SUBCUTANEOUS at 17:05

## 2021-12-10 RX ADMIN — Medication SCH MCG: at 08:13

## 2021-12-10 RX ADMIN — ENOXAPARIN SODIUM SCH MG: 40 INJECTION SUBCUTANEOUS at 20:09

## 2021-12-10 RX ADMIN — INSULIN ASPART SCH: 100 INJECTION, SOLUTION INTRAVENOUS; SUBCUTANEOUS at 20:11

## 2021-12-10 RX ADMIN — OXYCODONE HYDROCHLORIDE AND ACETAMINOPHEN SCH MG: 500 TABLET ORAL at 08:13

## 2021-12-10 RX ADMIN — GABAPENTIN SCH MG: 400 CAPSULE ORAL at 21:45

## 2021-12-10 RX ADMIN — GABAPENTIN SCH MG: 400 CAPSULE ORAL at 16:05

## 2021-12-10 RX ADMIN — ALBUTEROL SULFATE SCH PUFF: 90 AEROSOL, METERED RESPIRATORY (INHALATION) at 08:53

## 2021-12-10 RX ADMIN — ALBUTEROL SULFATE SCH PUFF: 90 AEROSOL, METERED RESPIRATORY (INHALATION) at 13:00

## 2021-12-10 RX ADMIN — DEXTROSE SCH MG: 50 INJECTION, SOLUTION INTRAVENOUS at 20:09

## 2021-12-10 RX ADMIN — FAMOTIDINE SCH MG: 20 TABLET, FILM COATED ORAL at 08:12

## 2021-12-10 RX ADMIN — ALBUTEROL SULFATE SCH: 90 AEROSOL, METERED RESPIRATORY (INHALATION) at 22:24

## 2021-12-10 RX ADMIN — ENOXAPARIN SODIUM SCH MG: 40 INJECTION SUBCUTANEOUS at 08:13

## 2021-12-10 RX ADMIN — CITALOPRAM HYDROBROMIDE SCH MG: 20 TABLET ORAL at 08:11

## 2021-12-10 RX ADMIN — PIPERACILLIN AND TAZOBACTAM SCH MLS/HR: 3; .375 INJECTION, POWDER, FOR SOLUTION INTRAVENOUS at 21:45

## 2021-12-10 RX ADMIN — LEVOTHYROXINE SODIUM SCH MCG: 50 TABLET ORAL at 05:56

## 2021-12-10 RX ADMIN — DEXTROSE SCH MG: 50 INJECTION, SOLUTION INTRAVENOUS at 08:13

## 2021-12-10 RX ADMIN — INSULIN ASPART SCH: 100 INJECTION, SOLUTION INTRAVENOUS; SUBCUTANEOUS at 08:03

## 2021-12-10 RX ADMIN — GABAPENTIN SCH MG: 400 CAPSULE ORAL at 08:12

## 2021-12-10 RX ADMIN — Medication SCH MG: at 08:13

## 2021-12-10 RX ADMIN — INSULIN ASPART SCH: 100 INJECTION, SOLUTION INTRAVENOUS; SUBCUTANEOUS at 12:19

## 2021-12-10 NOTE — P.PN
Subjective


Progress Note Date: 12/10/21





History of present illness


56-year-old  male with past medical history of fibromyalgia comes in to

emergency room with symptoms of nausea, vomiting, dizziness and sweating feeling

weak in with runny nose loss of taste and hence now for the past 1 week.  He 

presented on 12/7 was found to have positive COVID results.  Received his 

monoclonal antibodies.  He Came back to the emergency room because of worsening 

of symptoms.Vitals reviewed patient afebrile pulse 80 respiratory rate 20 blood 

pressure 138/82 saturating at 88% on 10 L labs reviewed WBC 6.6 hemoglobin 13.9 

d-dimer is elevated at 0.89, sodium 135 bicarb 21 BUN 16 creatinine 1.3 glucose 

187 ferritin 2822 AST 89 ALT 80 alkaline phosphatase 225 LDH 1091 and CRP 21 and

albumin 3.3


X-ray suggestive of bilateral patchy pneumonia.  Both interstitial and airspace 

infiltrate


Pulmonary clinic consulted for COVID pneumonia


Patient placed on Lovenox 40 subcu twice a day, vitamin C, D and zinc.  

Dexamethasone initiated at 6 mg twice a day IV


Patient is a candidate of Baritinib and will discuss about initiating it with 

pulmonary


Venous Doppler ordered





12/10: Patient is seen on the Med[facility] floor in follow-up.  He continues to have 

dyspnea and is on 15 L high flow nasal cannula and nonrebreather with pulse ox 

of 87 and 95%.  He's been afebrile, heart rate in the 60s and 70s, blood 

pressure 127/73.  Pro-calcitonin came back high at 9.75 and we started the 

patient on Zosyn and vancomycin for bacterial pneumonia coverage.  Patient has 

coarse crackles bilaterally.  No lower extremity edema.  He has been seen and 

followed by pulmonary medicine.  Patient is not a candidate for Baricitinib.  

Patient is continued on Decadron, Lovenox and vitamin supplements.





ROS


Constitutional: Endorses chills, fever, malaise, nausea and vomiting


Eyes: denies decreased vision, denies diplopia, denies discharge, denies pain


Ears: deny: decreased hearing


Ears, nose, mouth and throat: Denies dental pain, Denies headache, Denies nasal 

discharge, Denies nose pain


Cardiovascular: Denies chest pain, Denies decreased exercise tolerance, Denies 

edema, Denies high blood pressure, Denies irregular heart beat, Denies 

palpitations, Denies paroxysmal nocturnal dyspnea, Denies rapid heart beat, 

Denies shortness of breath


Respiratory: Denies congestion, reports cough, Denies cough with sputum, reports

dyspnea, Denies home oxygen, Denies wheezing, dyspnea with minimal exertion


Gastrointestinal: Denies abdominal pain, Denies change in bowel habits, Denies 

coffee ground emesis, Denies early satiety, Denies excessive gas, Denies 

heartburn, Denies hematemesis, Denies hematochezia, Denies loss of appetite, 

endorses nausea and vomiting


Genitourinary: Denies dysuria, Denies flank pain, Denies kidney stones, Denies 

menorrhagia, Denies urgency, Denies urinary frequency


Musculoskeletal: Denies gait dysfunction, Denies limitation of motion, Denies 

morning stiffness, Denies muscle cramps


Integumentary: Denies rash, Denies wounds, Denies brittle nails, Denies change 

in hair/nails, Denies darkening of skin


Neurological: Denies balance difficulties, Denies change in speech, Denies 

double vision, Denies gait dysfunction, Denies loss of vision, Denies motor 

disturbance, Denies numbness, Denies paralysis, Denies paresthesias, Denies 

seizures


Psychiatric: Denies anxiety, Denies depression


Endocrine: Denies excessive sweating, Denies excessive thirst, Denies high blood

sugars, Denies palpitations


Hematologic/Lymphatic: Denies easy bruising, Denies lymphadenopathy





Physical exam


- Constitutional


General appearance: cooperative, no acute distress, obese appears anxious


- EENT


Eyes: anicteric sclerae, PERRLA, normal appearance


ENT: hearing grossly normal


- Neck


Neck: no lymphadenopathy, normal ROM, no other, no rigidity, no stridor, no 

thyromegaly


- Respiratory


Respiratory: bilateral decreased air entry bilaterally


- Cardiovascular


Rhythm: regular


Heart sounds: normal: S1, S2


Abnormal Heart Sounds: no systolic murmur, no diastolic murmur, no rub, no S3 

Gallop, no S4 Gallop, no click, no other


- Gastrointestinal


General gastrointestinal: normal bowel sounds, soft


- Integumentary


Integumentary: no rash


- Neurologic


Neurologic: CNII-XII intact


- Musculoskeletal


Musculoskeletal: gait normal, strength equal bilaterally


- Psychiatric


Psychiatric: A&O x's 3, appropriate affect











Assessment and plan


Acute hypoxic respiratory failure


Acute COVID pneumonia


- Supplement oxygen to keep SpO2 more than 90%


- Dexamethasone 6 mg IV twice a day


- Lovenox 40 subcu twice a day


- Ruled out pulmonary embolism and DVT


- Not a candidate for Baricitinib


-Monitor inflammatory markers


-Pulmonary consulted


- Albuterol inhaler as needed


- Symbicort twice daily


- Continue supplements vitamin C, D and zinc





Acute transaminitis


- Secondary to viral inflammation


- Continue CMP monitoring





CK D3


- Continue to monitor patient's creatinine


- No acute kidney injury





History of fibromyalgia


- Continue gabapentin 800 3 times a day


Citalopram 40 mg by mouth daily


Tramadol 100 mg every 6 hours as needed





Insomnia


-Continue Eszopiclone 3 mg daily at bedtime 





Hypothyroidism


- Levothyroxine 50 g by mouth daily





DVT prophylaxis


Lovenox 40 subcu twice a day





GI prophylaxis


Pepcid 20 mg by mouth daily





Impression and plan of care have been directed as dictated by the signing 

physician.  Babita Hawkins nurse practitioner acting as scribe for signing 

physician.





Objective





- Vital Signs


Vital signs: 


                                   Vital Signs











Temp  97.3 F L  12/10/21 10:52


 


Pulse  73   12/10/21 10:52


 


Resp  16   12/10/21 10:52


 


BP  127/72   12/10/21 10:52


 


Pulse Ox  93 L  12/10/21 10:52








                                 Intake & Output











 12/09/21 12/10/21 12/10/21





 18:59 06:59 18:59


 


Intake Total  360 120


 


Output Total 600 200 


 


Balance -600 160 120


 


Weight 111.13 kg  


 


Intake:   


 


  Oral  360 120


 


Output:   


 


  Urine 600 200 


 


Other:   


 


  Voiding Method Urinal Urinal 


 


  # Voids  1 














- Labs


CBC & Chem 7: 


                                 12/08/21 23:57





                                 12/08/21 23:57


Labs: 


                  Abnormal Lab Results - Last 24 Hours (Table)











  12/09/21 12/09/21 12/09/21 Range/Units





  11:47 17:23 21:03 


 


POC Glucose (mg/dL)   141 H  126 H  (75-99)  mg/dL


 


Procalcitonin  9.75 H    (0.02-0.09)  ng/mL














  12/10/21 12/10/21 Range/Units





  07:22 11:39 


 


POC Glucose (mg/dL)  132 H  153 H  (75-99)  mg/dL


 


Procalcitonin    (0.02-0.09)  ng/mL

## 2021-12-10 NOTE — P.PN
Subjective


Progress Note Date: 12/10/21


Principal diagnosis: 


 Dyspnea, hypoxia, COVID-19





This is a very pleasant 56-year-old male patient with a known history of 

fibromyalgia.  Nonsmoker.  Approximate 1 week ago he developed symptoms of 

nausea vomiting diarrhea dizziness and sweating.  He presented here on 

12/07/2021 and has a positive for COVID-19 and received monoclonal antibodies.  

He came back to the emergency room last evening with worsening symptoms.  Chest 

x-ray reveals bilateral patchy interstitial and airspace infiltrates.  He is 

currently requiring 10 L high flow nasal cannula to maintain O2 saturations in 

the high 80s low 90s.  White count 6.6.  Hemoglobin 13.9.  Lymphocytes 0.3.  

Sodium 135.  Potassium 4.1.  Bicarb 21.  Creatinine 1.31.  Glucose 187.  

Ferritin 2822.  AST 89.  ALT 80.  LDH 1091.  C-reactive protein 21.0.  Initiated

on Decadron. 





On 12/10/2021 patient seen in follow-up on medical surgical floor, he is awake 

and alert, in no acute distress, overnight he wore 15 L high flow and n

onrebreather mask, he is currently off the nonrebreather mask and not he is on 

15 L high flow nasal cannula and he is maintaining O2 saturations at 93%, 

afebrile, hemodynamically stable.  However his oxygen requirements have 

significantly increased in last 24 hours.  His pro calcitonin level was sig

nificantly elevated at 9.75, and patient is not a candidate for Baricitinib in 

view of possibility of underlying bacterial infection.  Patient states she is 

coughing and bringing up some greenish colored phlegm at times, he is being 

started on Zosyn and vancomycin empirically, we will order blood cultures, 

sputum culture and urine cultures.  Clinically he looks fairly comfortable, no 

distress, lung sounds are revealing coarse diffuse rhonchi and rales.  No 

altered mentation, no fever.  He continues on Lovenox 40 mg twice daily, and 

Decadron 6 mg twice daily.











Objective





- Vital Signs


Vital signs: 


                                   Vital Signs











Temp  97.3 F L  12/10/21 10:52


 


Pulse  73   12/10/21 10:52


 


Resp  16   12/10/21 10:52


 


BP  127/72   12/10/21 10:52


 


Pulse Ox  93 L  12/10/21 10:52








                                 Intake & Output











 12/09/21 12/10/21 12/10/21





 18:59 06:59 18:59


 


Intake Total  360 120


 


Output Total 600 200 


 


Balance -600 160 120


 


Weight 111.13 kg  


 


Intake:   


 


  Oral  360 120


 


Output:   


 


  Urine 600 200 


 


Other:   


 


  Voiding Method Urinal Urinal 


 


  # Voids  1 














- Exam


 GENERAL EXAM: Alert, very pleasant, 56-year-old white male, on 15 L of oxygen 

pulse ox is 93% comfortable in no apparent distress.


HEAD: Normocephalic/atraumatic.


EYES: Normal reaction of pupils, equal size.  Conjunctiva pink, sclera white.


NOSE: Clear with pink turbinates.


THROAT: No erythema or exudates.


NECK: No masses, no JVD, no thyroid enlargement, no adenopathy.


CHEST: No chest wall deformity.  Symmetrical expansion. 


LUNGS: Equal air entry with diffuse crackles, and rhonchi, no wheezing


CVS: Regular rate and rhythm, normal S1 and S2, no gallops, no murmurs, no rubs


ABDOMEN: Soft, nontender.  No hepatosplenomegaly, normal bowel sounds, no 

guarding or rigidity.


EXTREMITIES: No clubbing, no edema, no cyanosis, 2+ pulses and upper and lower 

extremities.


MUSCULOSKELETAL: Muscle strength and tone normal.


SPINE: No scoliosis or deformity


SKIN: No rashes


CENTRAL NERVOUS SYSTEM: Alert and oriented -3.  No focal deficits, tone is 

normal in all 4 extremities.


PSYCHIATRIC: Alert and oriented -3.  Appropriate affect.  Intact judgment and 

insight.











- Labs


CBC & Chem 7: 


                                 12/08/21 23:57





                                 12/08/21 23:57


Labs: 


                  Abnormal Lab Results - Last 24 Hours (Table)











  12/09/21 12/09/21 12/09/21 Range/Units





  11:47 17:23 21:03 


 


POC Glucose (mg/dL)   141 H  126 H  (75-99)  mg/dL


 


Procalcitonin  9.75 H    (0.02-0.09)  ng/mL














  12/10/21 12/10/21 Range/Units





  07:22 11:39 


 


POC Glucose (mg/dL)  132 H  153 H  (75-99)  mg/dL


 


Procalcitonin    (0.02-0.09)  ng/mL














Assessment and Plan


Plan: 


 Assessment:





#1.  Acute hypoxic respiratory failure secondary to acute COVID-19 pneumonia.  

Patient is status post monoclonal antibody infusion on 12/07/2021.  Patient was 

not a candidate for Remdesivir related to severe hypoxic respiratory failure on 

presentation requiring 10 L high flow nasal cannula.  Today he is on 15 L and 

100% nonrebreather mask, his FiO2 requirements have increased but patient is not

a candidate for Baricitinib related to possibility of underlying bacterial 

infection





#2.  Elevated pro-calcitonin, rule out possibility of bacterial superinfection, 

patient has been empirically started on Zosyn and vancomycin





#3.  Elevated inflammatory markers related to acute COVID-19 pneumonia and 

possibly a bacterial pneumonia





#4.  Elevated LFTs related to COVID-19 pneumonia





#5.  Elevated d-dimer, venous Doppler of bilateral lower extremities was 

negative for DVT





#6.  History of fibromyalgia





#7.  Depression





#8.  Never smoker





#9.  Acute kidney injury possibly related to Covid 19 related ATN





#10.  Hypothyroidism





Plan:





Continue current medical treatment


Patient is not a candidate for Baricitinib related to possibility of underlying 

bacterial infection


We'll obtain blood cultures, sputum culture and urinalysis


Patient has been started on Zosyn and vancomycin


Continue with Decadron, continue with Lovenox


We'll continue to follow patient's clinical course


Monitor FiO2 demands patterns, monitor fever pattern


Encouraged patient to reposition self in bed, self prone if able


Follow-up chest x-ray tomorrow


Follow-up inflammatory markers





I performed a history & physical examination of the patient and discussed their 

management with my nurse practitioner, Lissy Brody.  I reviewed the nurse 

practitioner's note and agree with the documented findings and plan of care.  

Lung sounds are positive for dim breath sounds throughout the lung fields.  The 

findings and the impression was discussed with the patient.  I attest to the 

documentation by the nurse practitioner. 








Time with Patient: Less than 30

## 2021-12-11 LAB
ALBUMIN SERPL-MCNC: 3 G/DL (ref 3.5–5)
ALBUMIN/GLOB SERPL: 0.9 {RATIO}
ALP SERPL-CCNC: 161 U/L (ref 38–126)
ALT SERPL-CCNC: 138 U/L (ref 4–49)
ANION GAP SERPL CALC-SCNC: 9 MMOL/L
AST SERPL-CCNC: 101 U/L (ref 17–59)
BASOPHILS # BLD AUTO: 0 K/UL (ref 0–0.2)
BASOPHILS NFR BLD AUTO: 0 %
BUN SERPL-SCNC: 32 MG/DL (ref 9–20)
CALCIUM SPEC-MCNC: 8.6 MG/DL (ref 8.4–10.2)
CHLORIDE SERPL-SCNC: 107 MMOL/L (ref 98–107)
CO2 SERPL-SCNC: 24 MMOL/L (ref 22–30)
EOSINOPHIL # BLD AUTO: 0 K/UL (ref 0–0.7)
EOSINOPHIL NFR BLD AUTO: 0 %
ERYTHROCYTE [DISTWIDTH] IN BLOOD BY AUTOMATED COUNT: 4.52 M/UL (ref 4.3–5.9)
ERYTHROCYTE [DISTWIDTH] IN BLOOD: 14.1 % (ref 11.5–15.5)
GLOBULIN SER CALC-MCNC: 3.2 G/DL
GLUCOSE BLD-MCNC: 106 MG/DL (ref 75–99)
GLUCOSE BLD-MCNC: 115 MG/DL (ref 75–99)
GLUCOSE BLD-MCNC: 91 MG/DL (ref 75–99)
GLUCOSE BLD-MCNC: 96 MG/DL (ref 75–99)
GLUCOSE SERPL-MCNC: 116 MG/DL (ref 74–99)
HCT VFR BLD AUTO: 42.5 % (ref 39–53)
HGB BLD-MCNC: 13.9 GM/DL (ref 13–17.5)
LDH SPEC-CCNC: 1001 U/L (ref 313–618)
LYMPHOCYTES # SPEC AUTO: 0.7 K/UL (ref 1–4.8)
LYMPHOCYTES NFR SPEC AUTO: 8 %
MCH RBC QN AUTO: 30.9 PG (ref 25–35)
MCHC RBC AUTO-ENTMCNC: 32.8 G/DL (ref 31–37)
MCV RBC AUTO: 94.2 FL (ref 80–100)
MONOCYTES # BLD AUTO: 0.4 K/UL (ref 0–1)
MONOCYTES NFR BLD AUTO: 5 %
NEUTROPHILS # BLD AUTO: 6.9 K/UL (ref 1.3–7.7)
NEUTROPHILS NFR BLD AUTO: 84 %
PH UR: 6 [PH] (ref 5–8)
PLATELET # BLD AUTO: 310 K/UL (ref 150–450)
POTASSIUM SERPL-SCNC: 4.2 MMOL/L (ref 3.5–5.1)
PROT SERPL-MCNC: 6.2 G/DL (ref 6.3–8.2)
SODIUM SERPL-SCNC: 140 MMOL/L (ref 137–145)
SP GR UR: 1.03 (ref 1–1.03)
UROBILINOGEN UR QL STRIP: <2 MG/DL (ref ?–2)
WBC # BLD AUTO: 8.2 K/UL (ref 3.8–10.6)

## 2021-12-11 RX ADMIN — GABAPENTIN SCH MG: 400 CAPSULE ORAL at 21:25

## 2021-12-11 RX ADMIN — ALBUTEROL SULFATE SCH PUFF: 90 AEROSOL, METERED RESPIRATORY (INHALATION) at 10:54

## 2021-12-11 RX ADMIN — ENOXAPARIN SODIUM SCH MG: 40 INJECTION SUBCUTANEOUS at 21:25

## 2021-12-11 RX ADMIN — ALBUTEROL SULFATE SCH PUFF: 90 AEROSOL, METERED RESPIRATORY (INHALATION) at 19:45

## 2021-12-11 RX ADMIN — PIPERACILLIN AND TAZOBACTAM SCH MLS/HR: 3; .375 INJECTION, POWDER, FOR SOLUTION INTRAVENOUS at 14:24

## 2021-12-11 RX ADMIN — GABAPENTIN SCH MG: 400 CAPSULE ORAL at 16:07

## 2021-12-11 RX ADMIN — OXYCODONE HYDROCHLORIDE AND ACETAMINOPHEN SCH MG: 500 TABLET ORAL at 08:40

## 2021-12-11 RX ADMIN — PIPERACILLIN AND TAZOBACTAM SCH MLS/HR: 3; .375 INJECTION, POWDER, FOR SOLUTION INTRAVENOUS at 05:39

## 2021-12-11 RX ADMIN — PIPERACILLIN AND TAZOBACTAM SCH MLS/HR: 3; .375 INJECTION, POWDER, FOR SOLUTION INTRAVENOUS at 21:26

## 2021-12-11 RX ADMIN — CITALOPRAM HYDROBROMIDE SCH MG: 20 TABLET ORAL at 08:39

## 2021-12-11 RX ADMIN — INSULIN ASPART SCH: 100 INJECTION, SOLUTION INTRAVENOUS; SUBCUTANEOUS at 12:23

## 2021-12-11 RX ADMIN — INSULIN ASPART SCH: 100 INJECTION, SOLUTION INTRAVENOUS; SUBCUTANEOUS at 18:38

## 2021-12-11 RX ADMIN — LEVOTHYROXINE SODIUM SCH MCG: 50 TABLET ORAL at 05:39

## 2021-12-11 RX ADMIN — GABAPENTIN SCH MG: 400 CAPSULE ORAL at 08:39

## 2021-12-11 RX ADMIN — DEXTROSE SCH MG: 50 INJECTION, SOLUTION INTRAVENOUS at 08:39

## 2021-12-11 RX ADMIN — Medication SCH MG: at 08:40

## 2021-12-11 RX ADMIN — TEMAZEPAM PRN MG: 15 CAPSULE ORAL at 22:24

## 2021-12-11 RX ADMIN — INSULIN ASPART SCH: 100 INJECTION, SOLUTION INTRAVENOUS; SUBCUTANEOUS at 21:17

## 2021-12-11 RX ADMIN — FAMOTIDINE SCH MG: 20 TABLET, FILM COATED ORAL at 08:40

## 2021-12-11 RX ADMIN — ENOXAPARIN SODIUM SCH MG: 40 INJECTION SUBCUTANEOUS at 08:39

## 2021-12-11 RX ADMIN — DEXTROSE SCH MG: 50 INJECTION, SOLUTION INTRAVENOUS at 21:25

## 2021-12-11 RX ADMIN — Medication SCH MCG: at 08:40

## 2021-12-11 RX ADMIN — ALBUTEROL SULFATE SCH PUFF: 90 AEROSOL, METERED RESPIRATORY (INHALATION) at 07:24

## 2021-12-11 RX ADMIN — INSULIN ASPART SCH: 100 INJECTION, SOLUTION INTRAVENOUS; SUBCUTANEOUS at 07:15

## 2021-12-11 NOTE — P.PN
Subjective


Progress Note Date: 12/11/21


Principal diagnosis: 





COVID-19 pneumonia





This is a very pleasant 56-year-old male patient with a known history of 

fibromyalgia.  Nonsmoker.  Approximate 1 week ago he developed symptoms of 

nausea vomiting diarrhea dizziness and sweating.  He presented here on 

12/07/2021 and has a positive for COVID-19 and received monoclonal antibodies.  

He came back to the emergency room last evening with worsening symptoms.  Chest 

x-ray reveals bilateral patchy interstitial and airspace infiltrates.  He is 

currently requiring 10 L high flow nasal cannula to maintain O2 saturations in 

the high 80s low 90s.  White count 6.6.  Hemoglobin 13.9.  Lymphocytes 0.3.  

Sodium 135.  Potassium 4.1.  Bicarb 21.  Creatinine 1.31.  Glucose 187.  

Ferritin 2822.  AST 89.  ALT 80.  LDH 1091.  C-reactive protein 21.0.  Initiated

on Decadron. 





On 12/10/2021 patient seen in follow-up on medical surgical floor, he is awake 

and alert, in no acute distress, overnight he wore 15 L high flow and nonrebrea

ther mask, he is currently off the nonrebreather mask and not he is on 15 L high

flow nasal cannula and he is maintaining O2 saturations at 93%, afebrile, 

hemodynamically stable.  However his oxygen requirements have significantly 

increased in last 24 hours.  His pro calcitonin level was significantly elevated

at 9.75, and patient is not a candidate for Baricitinib in view of possibility 

of underlying bacterial infection.  Patient states she is coughing and bringing 

up some greenish colored phlegm at times, he is being started on Zosyn and 

vancomycin empirically, we will order blood cultures, sputum culture and urine 

cultures.  Clinically he looks fairly comfortable, no distress, lung sounds are 

revealing coarse diffuse rhonchi and rales.  No altered mentation, no fever.  He

continues on Lovenox 40 mg twice daily, and Decadron 6 mg twice daily.





The patient is seen today 12/11/2021 in follow-up on the regular medical floor. 

He remains awake and alert in no acute distress.  He is currently on 15 L high 

flow nasal cannula as a nonrebreather mask.  Chest x-ray showing some 

improvement in aeration.  He was initiated on vancomycin and Zosyn per medicine.

 Pro calcitonin was 9.7.  White count 8.2.  Hemoglobin 13.9.  D-dimer 1.10.  

Sodium 140.  Potassium 4.2.  Creatinine 1.21.  AST 11.  .  LDH 1001.  C-

reactive protein 1.7.  No cultures resulted.





Objective





- Vital Signs


Vital signs: 


                                   Vital Signs











Temp  98.3 F   12/11/21 15:19


 


Pulse  52 L  12/11/21 15:19


 


Resp  18   12/11/21 15:19


 


BP  117/72   12/11/21 15:19


 


Pulse Ox  93 L  12/11/21 15:19








                                 Intake & Output











 12/10/21 12/11/21 12/11/21





 18:59 06:59 18:59


 


Intake Total 120  


 


Output Total  500 


 


Balance 120 -500 


 


Intake:   


 


  Oral 120  


 


Output:   


 


  Urine  500 


 


Other:   


 


  Voiding Method  Urinal Urinal


 


  # Voids 3 1 


 


  # Bowel Movements  0 














- Exam





GENERAL EXAM: Alert, very pleasant, 56-year-old male patient, on 15 L of high 

flow oxygen plus a nonrebreather mask, fairly comfortable in no apparent 

distress.


HEAD: Normocephalic/atraumatic.


EYES: Normal reaction of pupils, equal size.  Conjunctiva pink, sclera white.


NOSE: Clear with pink turbinates.


THROAT: No erythema or exudates.


NECK: No masses, no JVD, no thyroid enlargement, no adenopathy.


CHEST: No chest wall deformity.  Symmetrical expansion. 


LUNGS: Equal air entry with diffuse crackles, and rhonchi, no wheezing


CVS: Regular rate and rhythm, normal S1 and S2, no gallops, no murmurs, no rubs


ABDOMEN: Soft, nontender.  No hepatosplenomegaly, normal bowel sounds, no 

guarding or rigidity.


EXTREMITIES: No clubbing, no edema, no cyanosis, 2+ pulses and upper and lower 

extremities.


MUSCULOSKELETAL: Muscle strength and tone normal.


SPINE: No scoliosis or deformity


SKIN: No rashes


CENTRAL NERVOUS SYSTEM: No focal deficits, tone is normal in all 4 extremities.


PSYCHIATRIC: Alert and oriented -3.  Appropriate affect.  Intact judgment and 

insight.





- Labs


CBC & Chem 7: 


                                 12/11/21 09:30





                                 12/11/21 09:30


Labs: 


                  Abnormal Lab Results - Last 24 Hours (Table)











  12/10/21 12/11/21 12/11/21 Range/Units





  20:08 02:28 07:02 


 


Lymphocytes #     (1.0-4.8)  k/uL


 


D-Dimer     (<0.60)  mg/L FEU


 


BUN     (9-20)  mg/dL


 


Glucose     (74-99)  mg/dL


 


POC Glucose (mg/dL)  129 H   115 H  (75-99)  mg/dL


 


AST     (17-59)  U/L


 


ALT     (4-49)  U/L


 


Alkaline Phosphatase     ()  U/L


 


Lactate Dehydrogenase     (313-618)  U/L


 


C-Reactive Protein     (<1.0)  mg/dL


 


Total Protein     (6.3-8.2)  g/dL


 


Albumin     (3.5-5.0)  g/dL


 


Urine Protein   Trace H   (Negative)  














  12/11/21 12/11/21 12/11/21 Range/Units





  09:30 09:30 09:30 


 


Lymphocytes #  0.7 L    (1.0-4.8)  k/uL


 


D-Dimer   1.10 H   (<0.60)  mg/L FEU


 


BUN    32 H  (9-20)  mg/dL


 


Glucose    116 H  (74-99)  mg/dL


 


POC Glucose (mg/dL)     (75-99)  mg/dL


 


AST    101 H  (17-59)  U/L


 


ALT    138 H  (4-49)  U/L


 


Alkaline Phosphatase    161 H  ()  U/L


 


Lactate Dehydrogenase    1001 H  (313-618)  U/L


 


C-Reactive Protein    1.7 H  (<1.0)  mg/dL


 


Total Protein    6.2 L  (6.3-8.2)  g/dL


 


Albumin    3.0 L  (3.5-5.0)  g/dL


 


Urine Protein     (Negative)  














  12/11/21 Range/Units





  12:05 


 


Lymphocytes #   (1.0-4.8)  k/uL


 


D-Dimer   (<0.60)  mg/L FEU


 


BUN   (9-20)  mg/dL


 


Glucose   (74-99)  mg/dL


 


POC Glucose (mg/dL)  106 H  (75-99)  mg/dL


 


AST   (17-59)  U/L


 


ALT   (4-49)  U/L


 


Alkaline Phosphatase   ()  U/L


 


Lactate Dehydrogenase   (313-618)  U/L


 


C-Reactive Protein   (<1.0)  mg/dL


 


Total Protein   (6.3-8.2)  g/dL


 


Albumin   (3.5-5.0)  g/dL


 


Urine Protein   (Negative)  














Assessment and Plan


Assessment: 





1 Acute hypoxemic respiratory failure secondary to acute COVID-19 pneumonia.  

Not vaccinated.  Received monoclonal antibodies on 12/07/2021.  On 15 L high 

flow nasal cannula + a nonrebreather mask.  Does not qualify for Remdesivir.  

Pro-calcitonin greater than 9.  Cultures pending.  On antibiotics and not 

qualifying for Baricitinib.





2 Elevated inflammatory markers secondary to above





3 Mild transaminitis secondary to above





4 History of fibromyalgia





5 Hypothyroidism





Plan:





Chest x-ray and labs reviewed


Cultures not available yet


No leukocytosis, no fever, discontinue vancomycin


Continue Zosyn for now, repeat pro-calcitonin


Continue Decadron, Lovenox, vitamin supplements


Continue bronchodilators, incentive spirometer


Titrate the FiO2 as tolerated


Follow-up inflammatory markers in the a.m.


We will continue to follow and make further recommendations based on his 

clinical status





I, the cosigning physician, performed a history & physical examination of the 

patient. Lungs sounds crackles in the bilateral bases.  Maintaining good O2 

saturations in the 90s on 15 L high flow nasal cannula plus a nonrebreather 

mask.  I discussed the assessment and plan of care with my nurse practitioner, 

Dolores Chaves. I attest to the above note as dictated by her.

## 2021-12-11 NOTE — P.PN
Subjective


Progress Note Date: 12/11/21


History of present illness


56-year-old  male with past medical history of fibromyalgia comes in to

emergency room with symptoms of nausea, vomiting, dizziness and sweating feeling

weak in with runny nose loss of taste and hence now for the past 1 week.  He 

presented on 12/7 was found to have positive COVID results.  Received his 

monoclonal antibodies.  He Came back to the emergency room because of worsening 

of symptoms.Vitals reviewed patient afebrile pulse 80 respiratory rate 20 blood 

pressure 138/82 saturating at 88% on 10 L labs reviewed WBC 6.6 hemoglobin 13.9 

d-dimer is elevated at 0.89, sodium 135 bicarb 21 BUN 16 creatinine 1.3 glucose 

187 ferritin 2822 AST 89 ALT 80 alkaline phosphatase 225 LDH 1091 and CRP 21 and

albumin 3.3


X-ray suggestive of bilateral patchy pneumonia.  Both interstitial and airspace 

infiltrate


Pulmonary clinic consulted for COVID pneumonia


Patient placed on Lovenox 40 subcu twice a day, vitamin C, D and zinc.  

Dexamethasone initiated at 6 mg twice a day IV


Patient is a candidate of Baritinib and will discuss about initiating it with 

pulmonary


Venous Doppler ordered





12/10: [facility].  He continues to have 

dyspnea and is on 15 L high flow nasal cannula and nonrebreather with pulse ox o

f 87 and 95%.  He's been afebrile, heart rate in the 60s and 70s, blood pressure

127/73.  Pro-calcitonin came back high at 9.75 and we started the patient on 

Zosyn and vancomycin for bacterial pneumonia coverage.  Patient has coarse 

crackles bilaterally.  No lower extremity edema.  He has been seen and followed 

by pulmonary medicine.  Patient is not a candidate for Baricitinib.  Patient is 

continued on Decadron, Lovenox and vitamin supplements.





12/11: Is found sitting up in the edge of the bed.  He is currently not on a 

Ventimask.  However his pulse oxing still around 88 to 91 % on Ventimask or 15 L

high flow nasal cannula. Patient continues to have dyspnea with activity and 

speaking. Patient states that he is feeling much better compared to yesterday.  

Continues to have a cough.  No lower extremity edema.  He has rhonchi to the 

bases bilaterally.  He is currently on Decadron Lovenox and vitamin supplements.

 Patient remains afebrile, heart rate 62, respirations 17, blood pressure 118/68

pulse ox 93% on 15 L high flow nasal cannula





ROS


Constitutional: Endorses chills, fever, malaise, nausea and vomiting


Eyes: denies decreased vision, denies diplopia, denies discharge, denies pain


Ears: deny: decreased hearing


Ears, nose, mouth and throat: Denies dental pain, Denies headache, Denies nasal 

discharge, Denies nose pain


Cardiovascular: Denies chest pain, Denies decreased exercise tolerance, Denies 

edema, Denies high blood pressure, Denies irregular heart beat, Denies 

palpitations, Denies paroxysmal nocturnal dyspnea, Denies rapid heart beat, 

Denies shortness of breath


Respiratory: Denies congestion, reports cough, Denies cough with sputum, reports

dyspnea, Denies home oxygen, Denies wheezing, dyspnea with minimal exertion


Gastrointestinal: Denies abdominal pain, Denies change in bowel habits, Denies 

coffee ground emesis, Denies early satiety, Denies excessive gas, Denies 

heartburn, Denies hematemesis, Denies hematochezia, Denies loss of appetite, 

endorses nausea and vomiting


Genitourinary: Denies dysuria, Denies flank pain, Denies kidney stones, Denies 

menorrhagia, Denies urgency, Denies urinary frequency


Musculoskeletal: Denies gait dysfunction, Denies limitation of motion, Denies 

morning stiffness, Denies muscle cramps


Integumentary: Denies rash, Denies wounds, Denies brittle nails, Denies change 

in hair/nails, Denies darkening of skin


Neurological: Denies balance difficulties, Denies change in speech, Denies 

double vision, Denies gait dysfunction, Denies loss of vision, Denies motor 

disturbance, Denies numbness, Denies paralysis, Denies paresthesias, Denies 

seizures


Psychiatric: Denies anxiety, Denies depression


Endocrine: Denies excessive sweating, Denies excessive thirst, Denies high blood

sugars, Denies palpitations


Hematologic/Lymphatic: Denies easy bruising, Denies lymphadenopathy





Physical exam


- Constitutional


General appearance: cooperative, no acute distress, obese appears anxious


- EENT


Eyes: anicteric sclerae, PERRLA, normal appearance


ENT: hearing grossly normal


- Neck


Neck: no lymphadenopathy, normal ROM, no other, no rigidity, no stridor, no 

thyromegaly


- Respiratory


Respiratory: bilateral decreased air entry bilaterally


- Cardiovascular


Rhythm: regular


Heart sounds: normal: S1, S2


Abnormal Heart Sounds: no systolic murmur, no diastolic murmur, no rub, no S3 

Gallop, no S4 Gallop, no click, no other


- Gastrointestinal


General gastrointestinal: normal bowel sounds, soft


- Integumentary


Integumentary: no rash


- Neurologic


Neurologic: CNII-XII intact


- Musculoskeletal


Musculoskeletal: gait normal, strength equal bilaterally


- Psychiatric


Psychiatric: A&O x's 3, appropriate affect





Assessment and plan


1. Acute hypoxic respiratory failure. Supplement oxygen to keep SpO2 more than 

90%. Ruled out pulmonary embolism and DVT,  Pulmonary consulted,  Albuterol 

inhaler as needed, Symbicort twice daily





2. Acute COVID pneumonia. Dexamethasone 6 mg IV twice a day,  Lovenox 40 subcu 

twice a day, Not a candidate for Baricitinib, Monitor inflammatory markers. 

Continue supplements vitamin C, D and zinc





3. Acute transaminitis Secondary to viral inflammation. Continue CMP monitoring





4. CK D3.  Continue to monitor patient's creatinine.  No acute kidney injury





5. History of fibromyalgia. continue gabapentin 800 3 times a day, Citalopram 40

mg by mouth daily, Tramadol 100 mg every 6 hours as needed





6. Insomnia. Continue Eszopiclone 3 mg daily at bedtime 





7. Hypothyroidism. Levothyroxine 50 g by mouth daily





8. DVT prophylaxis. Lovenox 40 subcu twice a day





9. GI prophylaxis. Pepcid 20 mg by mouth daily





Discharge Plan: 


more than likely home





Impression and plan of care have been directed as dictated by the signing 

physician.  Barb Jacobsen nurse practitioner acting as scribe for signing 

physician.








Objective





- Vital Signs


Vital signs: 


                                   Vital Signs











Temp  98 F   12/11/21 09:51


 


Pulse  62   12/11/21 09:51


 


Resp  17   12/11/21 09:51


 


BP  118/68   12/11/21 09:51


 


Pulse Ox  93 L  12/11/21 09:51








                                 Intake & Output











 12/10/21 12/11/21 12/11/21





 18:59 06:59 18:59


 


Intake Total 120  


 


Output Total  500 


 


Balance 120 -500 


 


Intake:   


 


  Oral 120  


 


Output:   


 


  Urine  500 


 


Other:   


 


  Voiding Method  Urinal Urinal


 


  # Voids 3 1 


 


  # Bowel Movements  0 














- Labs


CBC & Chem 7: 


                                 12/11/21 09:30





                                 12/11/21 09:30


Labs: 


                  Abnormal Lab Results - Last 24 Hours (Table)











  12/10/21 12/10/21 12/10/21 Range/Units





  11:39 16:51 20:08 


 


Lymphocytes #     (1.0-4.8)  k/uL


 


D-Dimer     (<0.60)  mg/L FEU


 


BUN     (9-20)  mg/dL


 


Glucose     (74-99)  mg/dL


 


POC Glucose (mg/dL)  153 H  133 H  129 H  (75-99)  mg/dL


 


AST     (17-59)  U/L


 


ALT     (4-49)  U/L


 


Alkaline Phosphatase     ()  U/L


 


Lactate Dehydrogenase     (313-618)  U/L


 


C-Reactive Protein     (<1.0)  mg/dL


 


Total Protein     (6.3-8.2)  g/dL


 


Albumin     (3.5-5.0)  g/dL


 


Urine Protein     (Negative)  














  12/11/21 12/11/21 12/11/21 Range/Units





  02:28 07:02 09:30 


 


Lymphocytes #    0.7 L  (1.0-4.8)  k/uL


 


D-Dimer     (<0.60)  mg/L FEU


 


BUN     (9-20)  mg/dL


 


Glucose     (74-99)  mg/dL


 


POC Glucose (mg/dL)   115 H   (75-99)  mg/dL


 


AST     (17-59)  U/L


 


ALT     (4-49)  U/L


 


Alkaline Phosphatase     ()  U/L


 


Lactate Dehydrogenase     (313-618)  U/L


 


C-Reactive Protein     (<1.0)  mg/dL


 


Total Protein     (6.3-8.2)  g/dL


 


Albumin     (3.5-5.0)  g/dL


 


Urine Protein  Trace H    (Negative)  














  12/11/21 12/11/21 Range/Units





  09:30 09:30 


 


Lymphocytes #    (1.0-4.8)  k/uL


 


D-Dimer  1.10 H   (<0.60)  mg/L FEU


 


BUN   32 H  (9-20)  mg/dL


 


Glucose   116 H  (74-99)  mg/dL


 


POC Glucose (mg/dL)    (75-99)  mg/dL


 


AST   101 H  (17-59)  U/L


 


ALT   138 H  (4-49)  U/L


 


Alkaline Phosphatase   161 H  ()  U/L


 


Lactate Dehydrogenase   1001 H  (313-618)  U/L


 


C-Reactive Protein   1.7 H  (<1.0)  mg/dL


 


Total Protein   6.2 L  (6.3-8.2)  g/dL


 


Albumin   3.0 L  (3.5-5.0)  g/dL


 


Urine Protein    (Negative)

## 2021-12-11 NOTE — XR
EXAMINATION TYPE: XR chest 1V portable

 

DATE OF EXAM: 12/11/2021

 

COMPARISON: Chest x-ray 12/8/2021

 

HISTORY: Covid

 

TECHNIQUE: Single frontal view of the chest is obtained.

 

FINDINGS:  There is no evident pneumothorax or pleural effusion. The airspace disease appears somewha
t less confluent on today's exam. Heart and mediastinal silhouette shows a similar appearance.

 

IMPRESSION:  Some improvement in aeration as compared to prior exam

## 2021-12-12 LAB
ALBUMIN SERPL-MCNC: 3.2 G/DL (ref 3.8–4.9)
ALBUMIN/GLOB SERPL: 1.24 G/DL (ref 1.6–3.17)
ALP SERPL-CCNC: 150 U/L (ref 41–126)
ALT SERPL-CCNC: 182 U/L (ref 10–49)
ANION GAP SERPL CALC-SCNC: 10.4 MMOL/L (ref 10–18)
AST SERPL-CCNC: 108 U/L (ref 14–35)
BASOPHILS # BLD AUTO: 0.02 X 10*3/UL (ref 0–0.1)
BASOPHILS NFR BLD AUTO: 0.3 %
BUN SERPL-SCNC: 26.9 MG/DL (ref 9–27)
BUN/CREAT SERPL: 22.8 RATIO (ref 12–20)
CALCIUM SPEC-MCNC: 8.5 MG/DL (ref 8.7–10.3)
CHLORIDE SERPL-SCNC: 104 MMOL/L (ref 96–109)
CO2 SERPL-SCNC: 22.5 MMOL/L (ref 20–27.5)
EOSINOPHIL # BLD AUTO: 0 X 10*3/UL (ref 0.04–0.35)
EOSINOPHIL NFR BLD AUTO: 0 %
ERYTHROCYTE [DISTWIDTH] IN BLOOD BY AUTOMATED COUNT: 4.39 X 10*6/UL (ref 4.4–5.6)
ERYTHROCYTE [DISTWIDTH] IN BLOOD: 13.5 % (ref 11.5–14.5)
GLOBULIN SER CALC-MCNC: 2.6 G/DL (ref 1.6–3.3)
GLUCOSE BLD-MCNC: 101 MG/DL (ref 75–99)
GLUCOSE BLD-MCNC: 101 MG/DL (ref 75–99)
GLUCOSE BLD-MCNC: 106 MG/DL (ref 75–99)
GLUCOSE BLD-MCNC: 93 MG/DL (ref 75–99)
GLUCOSE SERPL-MCNC: 111 MG/DL (ref 70–110)
HCT VFR BLD AUTO: 40.2 % (ref 39.6–50)
HGB BLD-MCNC: 13.2 G/DL (ref 13–17)
LDH SPEC-CCNC: 413 U/L (ref 120–246)
LYMPHOCYTES # SPEC AUTO: 0.65 X 10*3/UL (ref 0.9–5)
LYMPHOCYTES NFR SPEC AUTO: 8.8 %
MCH RBC QN AUTO: 30.1 PG (ref 27–32)
MCHC RBC AUTO-ENTMCNC: 32.8 G/DL (ref 32–37)
MCV RBC AUTO: 91.6 FL (ref 80–97)
MONOCYTES # BLD AUTO: 0.51 X 10*3/UL (ref 0.2–1)
MONOCYTES NFR BLD AUTO: 6.9 %
NEUTROPHILS # BLD AUTO: 6.12 X 10*3/UL (ref 1.8–7.7)
NEUTROPHILS NFR BLD AUTO: 82.4 %
PLATELET # BLD AUTO: 303 X 10*3/UL (ref 140–440)
POTASSIUM SERPL-SCNC: 4.5 MMOL/L (ref 3.5–5.5)
PROT SERPL-MCNC: 5.8 G/DL (ref 6.2–8.2)
SODIUM SERPL-SCNC: 136 MMOL/L (ref 135–145)
WBC # BLD AUTO: 7.42 X 10*3/UL (ref 4.5–10)

## 2021-12-12 RX ADMIN — ALBUTEROL SULFATE SCH PUFF: 90 AEROSOL, METERED RESPIRATORY (INHALATION) at 19:52

## 2021-12-12 RX ADMIN — PIPERACILLIN AND TAZOBACTAM SCH MLS/HR: 3; .375 INJECTION, POWDER, FOR SOLUTION INTRAVENOUS at 13:52

## 2021-12-12 RX ADMIN — CITALOPRAM HYDROBROMIDE SCH MG: 20 TABLET ORAL at 07:48

## 2021-12-12 RX ADMIN — INSULIN ASPART SCH: 100 INJECTION, SOLUTION INTRAVENOUS; SUBCUTANEOUS at 17:27

## 2021-12-12 RX ADMIN — INSULIN ASPART SCH: 100 INJECTION, SOLUTION INTRAVENOUS; SUBCUTANEOUS at 21:09

## 2021-12-12 RX ADMIN — TEMAZEPAM PRN MG: 15 CAPSULE ORAL at 21:17

## 2021-12-12 RX ADMIN — INSULIN ASPART SCH: 100 INJECTION, SOLUTION INTRAVENOUS; SUBCUTANEOUS at 07:42

## 2021-12-12 RX ADMIN — INSULIN ASPART SCH: 100 INJECTION, SOLUTION INTRAVENOUS; SUBCUTANEOUS at 12:16

## 2021-12-12 RX ADMIN — GABAPENTIN SCH MG: 400 CAPSULE ORAL at 21:09

## 2021-12-12 RX ADMIN — ENOXAPARIN SODIUM SCH MG: 40 INJECTION SUBCUTANEOUS at 21:09

## 2021-12-12 RX ADMIN — LEVOTHYROXINE SODIUM SCH MCG: 50 TABLET ORAL at 05:11

## 2021-12-12 RX ADMIN — PIPERACILLIN AND TAZOBACTAM SCH MLS/HR: 3; .375 INJECTION, POWDER, FOR SOLUTION INTRAVENOUS at 21:09

## 2021-12-12 RX ADMIN — Medication SCH MG: at 07:47

## 2021-12-12 RX ADMIN — ALBUTEROL SULFATE SCH PUFF: 90 AEROSOL, METERED RESPIRATORY (INHALATION) at 11:27

## 2021-12-12 RX ADMIN — Medication SCH MCG: at 07:48

## 2021-12-12 RX ADMIN — PIPERACILLIN AND TAZOBACTAM SCH MLS/HR: 3; .375 INJECTION, POWDER, FOR SOLUTION INTRAVENOUS at 05:11

## 2021-12-12 RX ADMIN — DEXTROSE SCH MG: 50 INJECTION, SOLUTION INTRAVENOUS at 21:09

## 2021-12-12 RX ADMIN — GABAPENTIN SCH MG: 400 CAPSULE ORAL at 07:47

## 2021-12-12 RX ADMIN — FAMOTIDINE SCH MG: 20 TABLET, FILM COATED ORAL at 07:48

## 2021-12-12 RX ADMIN — ALBUTEROL SULFATE SCH PUFF: 90 AEROSOL, METERED RESPIRATORY (INHALATION) at 07:14

## 2021-12-12 RX ADMIN — OXYCODONE HYDROCHLORIDE AND ACETAMINOPHEN SCH MG: 500 TABLET ORAL at 07:47

## 2021-12-12 RX ADMIN — ENOXAPARIN SODIUM SCH MG: 40 INJECTION SUBCUTANEOUS at 07:48

## 2021-12-12 RX ADMIN — DEXTROSE SCH MG: 50 INJECTION, SOLUTION INTRAVENOUS at 07:47

## 2021-12-12 RX ADMIN — GABAPENTIN SCH MG: 400 CAPSULE ORAL at 16:04

## 2021-12-12 NOTE — P.PN
Subjective


Progress Note Date: 12/12/21


Principal diagnosis: 


 Dyspnea, hypoxia, COVID-19





This is a very pleasant 56-year-old male patient with a known history of 

fibromyalgia.  Nonsmoker.  Approximate 1 week ago he developed symptoms of 

nausea vomiting diarrhea dizziness and sweating.  He presented here on 

12/07/2021 and has a positive for COVID-19 and received monoclonal antibodies.  

He came back to the emergency room last evening with worsening symptoms.  Chest 

x-ray reveals bilateral patchy interstitial and airspace infiltrates.  He is 

currently requiring 10 L high flow nasal cannula to maintain O2 saturations in 

the high 80s low 90s.  White count 6.6.  Hemoglobin 13.9.  Lymphocytes 0.3.  

Sodium 135.  Potassium 4.1.  Bicarb 21.  Creatinine 1.31.  Glucose 187.  

Ferritin 2822.  AST 89.  ALT 80.  LDH 1091.  C-reactive protein 21.0.  Initiated

on Decadron. 





On 12/10/2021 patient seen in follow-up on medical surgical floor, he is awake 

and alert, in no acute distress, overnight he wore 15 L high flow and n

onrebreather mask, he is currently off the nonrebreather mask and not he is on 

15 L high flow nasal cannula and he is maintaining O2 saturations at 93%, 

afebrile, hemodynamically stable.  However his oxygen requirements have 

significantly increased in last 24 hours.  His pro calcitonin level was sig

nificantly elevated at 9.75, and patient is not a candidate for Baricitinib in 

view of possibility of underlying bacterial infection.  Patient states she is 

coughing and bringing up some greenish colored phlegm at times, he is being 

started on Zosyn and vancomycin empirically, we will order blood cultures, 

sputum culture and urine cultures.  Clinically he looks fairly comfortable, no 

distress, lung sounds are revealing coarse diffuse rhonchi and rales.  No 

altered mentation, no fever.  He continues on Lovenox 40 mg twice daily, and 

Decadron 6 mg twice daily.





On 12/12/2021 patient seen in follow-up on medical surgical floor, is currently 

on 15 L of oxygen, he is not using the nonrebreather mask, his pulse ox is 90-

95%, patient has been self repositioning in bed, he states his breathing is 

about the same, maybe slightly improved, lung sounds are positive for fine rales

bilaterally, he remains on Zosyn for empiric antibiotic coverage, Decadron 6 

mggram twice daily, vancomycin has been discontinued.  Patient is afebrile, 

hemodynamically has been stable.  Tolerating oral intake, his labs have been 

reviewed today, his white blood cell count is 7.42, hemoglobin is 13.2, d-dimer 

is 1.4, electrolytes and renal profile are within normal limits, his liver 

enzymes have worsened on today's labs, and AST is up to 108, ALT is 182, 

alkaline phosphatase is actually improved and is down to 150.  His LDH is 

improved and is down to 413, and CRP is 4.1, improved compared to admission 

levels.  Pro calcitonin level is improving and is down to 1.34, urinalysis did 

not show evidence of infection.











Objective





- Vital Signs


Vital signs: 


                                   Vital Signs











Temp  99 F   12/12/21 13:18


 


Pulse  94   12/12/21 13:18


 


Resp  17   12/12/21 13:18


 


BP  124/78   12/12/21 13:18


 


Pulse Ox  96   12/12/21 13:18








                                 Intake & Output











 12/11/21 12/12/21 12/12/21





 18:59 06:59 18:59


 


Intake Total 640  


 


Output Total  1100 


 


Balance 640 -1100 


 


Intake:   


 


  Intake, IV Titration 100  





  Amount   


 


    Piperacillin-Tazobactam 3 100  





    .375 gm In Sodium   





    Chloride 0.9% 100 ml @ 25   





    mls/hr IVPB Q8H [facility] Rx#:   





    [id number]   


 


  Oral 540  


 


Output:   


 


  Urine  1100 


 


Other:   


 


  Voiding Method Urinal Urinal Urinal


 


  # Voids 3 2 


 


  # Bowel Movements  0 1














- Exam


 GENERAL EXAM: Alert, very pleasant, 56-year-old white male, on 15 L of oxygen 

pulse ox is 93% comfortable in no apparent distress.


HEAD: Normocephalic/atraumatic.


EYES: Normal reaction of pupils, equal size.  Conjunctiva pink, sclera white.


NOSE: Clear with pink turbinates.


THROAT: No erythema or exudates.


NECK: No masses, no JVD, no thyroid enlargement, no adenopathy.


CHEST: No chest wall deformity.  Symmetrical expansion. 


LUNGS: Equal air entry with diffuse crackles, and rhonchi, no wheezing


CVS: Regular rate and rhythm, normal S1 and S2, no gallops, no murmurs, no rubs


ABDOMEN: Soft, nontender.  No hepatosplenomegaly, normal bowel sounds, no 

guarding or rigidity.


EXTREMITIES: No clubbing, no edema, no cyanosis, 2+ pulses and upper and lower 

extremities.


MUSCULOSKELETAL: Muscle strength and tone normal.


SPINE: No scoliosis or deformity


SKIN: No rashes


CENTRAL NERVOUS SYSTEM: Alert and oriented -3.  No focal deficits, tone is 

normal in all 4 extremities.


PSYCHIATRIC: Alert and oriented -3.  Appropriate affect.  Intact judgment and 

insight.











- Labs


CBC & Chem 7: 


                                 12/12/21 06:17





                                 12/12/21 06:17


Labs: 


                  Abnormal Lab Results - Last 24 Hours (Table)











  12/11/21 12/12/21 12/12/21 Range/Units





  19:35 06:17 06:17 


 


RBC   4.39 L   (4.40-5.60)  X 10*6/uL


 


Immature Gran #   0.12 H   (0.00-0.04)  X 10*3/uL


 


Lymphocytes #   0.65 L   (0.90-5.00)  X 10*3/uL


 


Eosinophils #   0 L   (0.04-0.35)  X 10*3/uL


 


D-Dimer     (<0.60)  mg/L FEU


 


BUN/Creatinine Ratio    22.80 H  (12.00-20.00)  Ratio


 


Glucose    111 H  ()  mg/dL


 


POC Glucose (mg/dL)     (75-99)  mg/dL


 


Calcium    8.5 L  (8.7-10.3)  mg/dL


 


AST    108 H  (14-35)  U/L


 


ALT    182 H  (10-49)  U/L


 


Alkaline Phosphatase    150 H  ()  U/L


 


Lactate Dehydrogenase    413 H  (120-246)  U/L


 


C-Reactive Protein    4.10 H  (0.00-0.80)  mg/dL


 


Total Protein    5.8 L  (6.2-8.2)  g/dL


 


Albumin    3.2 L  (3.8-4.9)  g/dL


 


Albumin/Globulin Ratio    1.24 L  (1.60-3.17)  g/dL


 


Procalcitonin  1.34 H    (0.02-0.09)  ng/mL














  12/12/21 12/12/21 12/12/21 Range/Units





  06:17 07:03 11:35 


 


RBC     (4.40-5.60)  X 10*6/uL


 


Immature Gran #     (0.00-0.04)  X 10*3/uL


 


Lymphocytes #     (0.90-5.00)  X 10*3/uL


 


Eosinophils #     (0.04-0.35)  X 10*3/uL


 


D-Dimer  1.40 H    (<0.60)  mg/L FEU


 


BUN/Creatinine Ratio     (12.00-20.00)  Ratio


 


Glucose     ()  mg/dL


 


POC Glucose (mg/dL)   101 H  106 H  (75-99)  mg/dL


 


Calcium     (8.7-10.3)  mg/dL


 


AST     (14-35)  U/L


 


ALT     (10-49)  U/L


 


Alkaline Phosphatase     ()  U/L


 


Lactate Dehydrogenase     (120-246)  U/L


 


C-Reactive Protein     (0.00-0.80)  mg/dL


 


Total Protein     (6.2-8.2)  g/dL


 


Albumin     (3.8-4.9)  g/dL


 


Albumin/Globulin Ratio     (1.60-3.17)  g/dL


 


Procalcitonin     (0.02-0.09)  ng/mL














Assessment and Plan


Plan: 


 Assessment:





#1.  Acute hypoxic respiratory failure secondary to acute COVID-19 pneumonia.  

Patient is status post monoclonal antibody infusion on 12/07/2021.  Patient was 

not a candidate for Remdesivir related to severe hypoxic respiratory failure on 

presentation requiring 10 L high flow nasal cannula.  Today he is on 15 L and 

100% nonrebreather mask, his FiO2 requirements have increased but patient is not

a candidate for Baricitinib related to possibility of underlying bacterial 

infection





#2.  Elevated pro-calcitonin, rule out possibility of bacterial superinfection, 

patient has been empirically started on Zosyn and vancomycin





#3.  Elevated inflammatory markers related to acute COVID-19 pneumonia and 

possibly a bacterial pneumonia





#4.  Elevated LFTs related to COVID-19 pneumonia, slightly worsened





#5.  Elevated d-dimer, venous Doppler of bilateral lower extremities was 

negative for DVT





#6.  History of fibromyalgia





#7.  Depression





#8.  Never smoker





#9.  Acute kidney injury possibly related to Covid 19 related ATN





#10.  Hypothyroidism





Plan:





Continue current medical treatment


Continue Decadron, continue Lovenox


Continue Zosyn for empiric antibiotic coverage


Pocalcitonin level is improved but still elevated


Obtain sputum culture, blood cultures


Will continue to follow





I performed a history & physical examination of the patient and discussed their 

management with my nurse practitioner, Lissy Brody.  I reviewed the nurse 

practitioner's note and agree with the documented findings and plan of care.  

Lung sounds are positive for dim breath sounds throughout the lung fields.  The 

findings and the impression was discussed with the patient.  I attest to the 

documentation by the nurse practitioner. 








Time with Patient: Less than 30

## 2021-12-12 NOTE — P.PN
Subjective


Progress Note Date: 12/12/21


History of present illness


56-year-old  male with past medical history of fibromyalgia comes in to

emergency room with symptoms of nausea, vomiting, dizziness and sweating feeling

weak in with runny nose loss of taste and hence now for the past 1 week.  He 

presented on 12/7 was found to have positive COVID results.  Received his 

monoclonal antibodies.  He Came back to the emergency room because of worsening 

of symptoms.Vitals reviewed patient afebrile pulse 80 respiratory rate 20 blood 

pressure 138/82 saturating at 88% on 10 L labs reviewed WBC 6.6 hemoglobin 13.9 

d-dimer is elevated at 0.89, sodium 135 bicarb 21 BUN 16 creatinine 1.3 glucose 

187 ferritin 2822 AST 89 ALT 80 alkaline phosphatase 225 LDH 1091 and CRP 21 and

albumin 3.3


X-ray suggestive of bilateral patchy pneumonia.  Both interstitial and airspace 

infiltrate


Pulmonary clinic consulted for COVID pneumonia


Patient placed on Lovenox 40 subcu twice a day, vitamin C, D and zinc.  

Dexamethasone initiated at 6 mg twice a day IV


Patient is a candidate of Baritinib and will discuss about initiating it with 

pulmonary


Venous Doppler ordered





12/10: [facility].  He continues to have 

dyspnea and is on 15 L high flow nasal cannula and nonrebreather with pulse ox o

f 87 and 95%.  He's been afebrile, heart rate in the 60s and 70s, blood pressure

127/73.  Pro-calcitonin came back high at 9.75 and we started the patient on 

Zosyn and vancomycin for bacterial pneumonia coverage.  Patient has coarse 

crackles bilaterally.  No lower extremity edema.  He has been seen and followed 

by pulmonary medicine.  Patient is not a candidate for Baricitinib.  Patient is 

continued on Decadron, Lovenox and vitamin supplements.





12/11: Is found sitting up in the edge of the bed.  He is currently not on a 

Ventimask.  However his pulse oxing still around 88 to 91 % on Ventimask or 15 L

high flow nasal cannula. Patient continues to have dyspnea with activity and 

speaking. Patient states that he is feeling much better compared to yesterday.  

Continues to have a cough.  No lower extremity edema.  He has rhonchi to the 

bases bilaterally.  He is currently on Decadron Lovenox and vitamin supplements.

 Patient remains afebrile, heart rate 62, respirations 17, blood pressure 118/68

pulse ox 93% on 15 L high flow nasal cannula





12/12: Patient was up to the bathroom showering today.  Awaiting inflammation 

markers today.  Patient still requires 10 L of O2 with a pulse ox of 90%.  

Patient continues to have crackles to the bilateral bases.  Dyspnea with 

activity and speaking.  Patient states he is feeling better however he is very 

tired.  Continues to have cough no lower extremity edema.  He still currently on

Decadron, Lovenox, and vitamin supplements.  Patient remains afebrile, heart 

rate 58, respirations 17, blood pressure 118/89,





ROS


Constitutional: Endorses chills, fever, malaise, nausea and vomiting


Eyes: denies decreased vision, denies diplopia, denies discharge, denies pain


Ears: deny: decreased hearing


Ears, nose, mouth and throat: Denies dental pain, Denies headache, Denies nasal 

discharge, Denies nose pain


Cardiovascular: Denies chest pain, Denies decreased exercise tolerance, Denies 

edema, Denies high blood pressure, Denies irregular heart beat, Denies 

palpitations, Denies paroxysmal nocturnal dyspnea, Denies rapid heart beat, 

Denies shortness of breath


Respiratory: Denies congestion, reports cough, Denies cough with sputum, reports

dyspnea, Denies home oxygen, Denies wheezing, dyspnea with minimal exertion


Gastrointestinal: Denies abdominal pain, Denies change in bowel habits, Denies 

coffee ground emesis, Denies early satiety, Denies excessive gas, Denies 

heartburn, Denies hematemesis, Denies hematochezia, Denies loss of appetite, 

endorses nausea and vomiting


Genitourinary: Denies dysuria, Denies flank pain, Denies kidney stones, Denies 

menorrhagia, Denies urgency, Denies urinary frequency


Musculoskeletal: Denies gait dysfunction, Denies limitation of motion, Denies 

morning stiffness, Denies muscle cramps


Integumentary: Denies rash, Denies wounds, Denies brittle nails, Denies change 

in hair/nails, Denies darkening of skin


Neurological: Denies balance difficulties, Denies change in speech, Denies 

double vision, Denies gait dysfunction, Denies loss of vision, Denies motor 

disturbance, Denies numbness, Denies paralysis, Denies paresthesias, Denies 

seizures


Psychiatric: Denies anxiety, Denies depression


Endocrine: Denies excessive sweating, Denies excessive thirst, Denies high blood

sugars, Denies palpitations


Hematologic/Lymphatic: Denies easy bruising, Denies lymphadenopathy





Physical exam


General Appearance: Alert, cooperative, no distress, appears stated age.  

Resting at the site of the bed


Neck HEENT: Supple, no lymphadenopathy, no thyroid enlargement, no carotid bruit

s.


Lungs: Bilateral crackles to the bases  no rhonchi, no deformity.


Chest Wall: Chest wall normal expansion with deep inspiration no tenderness and 

no deformity was found on exam, no costochondral pain or discomfort.


Heart: Regular rate and rhythm, S1, S2 normal, no murmur, rub or gallop.


Back: Symmetric, no curvature, ROM normal, no CVA tenderness.


Abdomen: Soft, non-tender, no rebound or rigidity, no hepatosplenomegaly.


Extremities: Extremities normal, atraumatic, no cyanosis or edema.


Pulses: 2+ and symmetric.


Skin: Skin color, texture, tugor normal, no rashes or lesions.


Neurologic: Alert oriented x3 cranial nerves II through XII intact, no motor 

deficit, no abnormal balance or gait





Assessment and plan


1. Acute hypoxic respiratory failure. Supplement oxygen to keep SpO2 more than 

90%. Ruled out pulmonary embolism and DVT,  Pulmonary consulted,  Albuterol in

haler as needed, Symbicort twice daily





2. Acute COVID pneumonia. Dexamethasone 6 mg IV twice a day,  Lovenox 40 subcu 

twice a day, Not a candidate for Baricitinib, Monitor inflammatory markers. 

Continue supplements vitamin C, D and zinc





3. Acute transaminitis Secondary to viral inflammation. Continue CMP monitoring





4. CK D3.  Continue to monitor patient's creatinine.  No acute kidney injury





5. History of fibromyalgia. continue gabapentin 800 3 times a day, Citalopram 40

mg by mouth daily, Tramadol 100 mg every 6 hours as needed





6. Insomnia. Continue Eszopiclone 3 mg daily at bedtime 





7. Hypothyroidism. Levothyroxine 50 g by mouth daily





8. DVT prophylaxis. Lovenox 40 subcu twice a day





9. GI prophylaxis. Pepcid 20 mg by mouth daily





Discharge Plan: 


more than likely home





Impression and plan of care have been directed as dictated by the signing 

physician.  Barb Jacobsen nurse practitioner acting as scribe for signing 

physician.








Objective





- Vital Signs


Vital signs: 


                                   Vital Signs











Temp  98.9 F   12/12/21 09:40


 


Pulse  58 L  12/12/21 09:40


 


Resp  17   12/12/21 09:40


 


BP  118/69   12/12/21 09:40


 


Pulse Ox  90 L  12/12/21 09:40








                                 Intake & Output











 12/11/21 12/12/21 12/12/21





 18:59 06:59 18:59


 


Intake Total 640  


 


Output Total  1100 


 


Balance 640 -1100 


 


Intake:   


 


  Intake, IV Titration 100  





  Amount   


 


    Piperacillin-Tazobactam 3 100  





    .375 gm In Sodium   





    Chloride 0.9% 100 ml @ 25   





    mls/hr IVPB Q8H [facility] Rx#:   





    [id number]   


 


  Oral 540  


 


Output:   


 


  Urine  1100 


 


Other:   


 


  Voiding Method Urinal Urinal Urinal


 


  # Voids 3 2 


 


  # Bowel Movements  0 1














- Labs


CBC & Chem 7: 


                                 12/12/21 06:17





                                 12/11/21 09:30


Labs: 


                  Abnormal Lab Results - Last 24 Hours (Table)











  12/11/21 12/11/21 12/11/21 Range/Units





  09:30 09:30 09:30 


 


RBC     (4.40-5.60)  X 10*6/uL


 


Immature Gran #     (0.00-0.04)  X 10*3/uL


 


Lymphocytes #  0.7 L    (1.0-4.8)  k/uL


 


Eosinophils #     (0.04-0.35)  X 10*3/uL


 


D-Dimer   1.10 H   (<0.60)  mg/L FEU


 


BUN    32 H  (9-20)  mg/dL


 


Glucose    116 H  (74-99)  mg/dL


 


POC Glucose (mg/dL)     (75-99)  mg/dL


 


AST    101 H  (17-59)  U/L


 


ALT    138 H  (4-49)  U/L


 


Alkaline Phosphatase    161 H  ()  U/L


 


Lactate Dehydrogenase    1001 H  (313-618)  U/L


 


C-Reactive Protein    1.7 H  (<1.0)  mg/dL


 


Total Protein    6.2 L  (6.3-8.2)  g/dL


 


Albumin    3.0 L  (3.5-5.0)  g/dL


 


Procalcitonin     (0.02-0.09)  ng/mL














  12/11/21 12/11/21 12/12/21 Range/Units





  12:05 19:35 06:17 


 


RBC    4.39 L  (4.40-5.60)  X 10*6/uL


 


Immature Gran #    0.12 H  (0.00-0.04)  X 10*3/uL


 


Lymphocytes #    0.65 L  (1.0-4.8)  k/uL


 


Eosinophils #    0 L  (0.04-0.35)  X 10*3/uL


 


D-Dimer     (<0.60)  mg/L FEU


 


BUN     (9-20)  mg/dL


 


Glucose     (74-99)  mg/dL


 


POC Glucose (mg/dL)  106 H    (75-99)  mg/dL


 


AST     (17-59)  U/L


 


ALT     (4-49)  U/L


 


Alkaline Phosphatase     ()  U/L


 


Lactate Dehydrogenase     (313-618)  U/L


 


C-Reactive Protein     (<1.0)  mg/dL


 


Total Protein     (6.3-8.2)  g/dL


 


Albumin     (3.5-5.0)  g/dL


 


Procalcitonin   1.34 H   (0.02-0.09)  ng/mL














  12/12/21 12/12/21 Range/Units





  06:17 07:03 


 


RBC    (4.40-5.60)  X 10*6/uL


 


Immature Gran #    (0.00-0.04)  X 10*3/uL


 


Lymphocytes #    (1.0-4.8)  k/uL


 


Eosinophils #    (0.04-0.35)  X 10*3/uL


 


D-Dimer  1.40 H   (<0.60)  mg/L FEU


 


BUN    (9-20)  mg/dL


 


Glucose    (74-99)  mg/dL


 


POC Glucose (mg/dL)   101 H  (75-99)  mg/dL


 


AST    (17-59)  U/L


 


ALT    (4-49)  U/L


 


Alkaline Phosphatase    ()  U/L


 


Lactate Dehydrogenase    (313-618)  U/L


 


C-Reactive Protein    (<1.0)  mg/dL


 


Total Protein    (6.3-8.2)  g/dL


 


Albumin    (3.5-5.0)  g/dL


 


Procalcitonin    (0.02-0.09)  ng/mL

## 2021-12-13 LAB
GLUCOSE BLD-MCNC: 101 MG/DL (ref 75–99)
GLUCOSE BLD-MCNC: 106 MG/DL (ref 75–99)
GLUCOSE BLD-MCNC: 109 MG/DL (ref 75–99)
GLUCOSE BLD-MCNC: 112 MG/DL (ref 75–99)

## 2021-12-13 RX ADMIN — INSULIN ASPART SCH: 100 INJECTION, SOLUTION INTRAVENOUS; SUBCUTANEOUS at 20:45

## 2021-12-13 RX ADMIN — PIPERACILLIN AND TAZOBACTAM SCH MLS/HR: 3; .375 INJECTION, POWDER, FOR SOLUTION INTRAVENOUS at 21:54

## 2021-12-13 RX ADMIN — INSULIN ASPART SCH: 100 INJECTION, SOLUTION INTRAVENOUS; SUBCUTANEOUS at 11:56

## 2021-12-13 RX ADMIN — ALBUTEROL SULFATE SCH PUFF: 90 AEROSOL, METERED RESPIRATORY (INHALATION) at 15:55

## 2021-12-13 RX ADMIN — Medication SCH MG: at 08:12

## 2021-12-13 RX ADMIN — GABAPENTIN SCH MG: 400 CAPSULE ORAL at 08:12

## 2021-12-13 RX ADMIN — CITALOPRAM HYDROBROMIDE SCH MG: 20 TABLET ORAL at 08:12

## 2021-12-13 RX ADMIN — Medication SCH MCG: at 08:12

## 2021-12-13 RX ADMIN — PIPERACILLIN AND TAZOBACTAM SCH MLS/HR: 3; .375 INJECTION, POWDER, FOR SOLUTION INTRAVENOUS at 13:50

## 2021-12-13 RX ADMIN — ENOXAPARIN SODIUM SCH MG: 40 INJECTION SUBCUTANEOUS at 08:12

## 2021-12-13 RX ADMIN — TEMAZEPAM PRN MG: 15 CAPSULE ORAL at 20:00

## 2021-12-13 RX ADMIN — INSULIN ASPART SCH: 100 INJECTION, SOLUTION INTRAVENOUS; SUBCUTANEOUS at 16:55

## 2021-12-13 RX ADMIN — LEVOTHYROXINE SODIUM SCH MCG: 50 TABLET ORAL at 05:28

## 2021-12-13 RX ADMIN — DEXTROSE SCH MG: 50 INJECTION, SOLUTION INTRAVENOUS at 19:47

## 2021-12-13 RX ADMIN — FAMOTIDINE SCH MG: 20 TABLET, FILM COATED ORAL at 08:12

## 2021-12-13 RX ADMIN — ALBUTEROL SULFATE SCH PUFF: 90 AEROSOL, METERED RESPIRATORY (INHALATION) at 12:15

## 2021-12-13 RX ADMIN — PIPERACILLIN AND TAZOBACTAM SCH MLS/HR: 3; .375 INJECTION, POWDER, FOR SOLUTION INTRAVENOUS at 05:28

## 2021-12-13 RX ADMIN — INSULIN ASPART SCH: 100 INJECTION, SOLUTION INTRAVENOUS; SUBCUTANEOUS at 08:08

## 2021-12-13 RX ADMIN — ENOXAPARIN SODIUM SCH MG: 40 INJECTION SUBCUTANEOUS at 19:47

## 2021-12-13 RX ADMIN — ALBUTEROL SULFATE SCH PUFF: 90 AEROSOL, METERED RESPIRATORY (INHALATION) at 07:41

## 2021-12-13 RX ADMIN — OXYCODONE HYDROCHLORIDE AND ACETAMINOPHEN SCH MG: 500 TABLET ORAL at 08:12

## 2021-12-13 RX ADMIN — GABAPENTIN SCH MG: 400 CAPSULE ORAL at 19:47

## 2021-12-13 RX ADMIN — DEXTROSE SCH MG: 50 INJECTION, SOLUTION INTRAVENOUS at 08:11

## 2021-12-13 RX ADMIN — GABAPENTIN SCH MG: 400 CAPSULE ORAL at 16:51

## 2021-12-13 NOTE — P.PN
Subjective


Progress Note Date: 12/13/21


Principal diagnosis: 


 Dyspnea, hypoxia, COVID-19





This is a very pleasant 56-year-old male patient with a known history of 

fibromyalgia.  Nonsmoker.  Approximate 1 week ago he developed symptoms of 

nausea vomiting diarrhea dizziness and sweating.  He presented here on 

12/07/2021 and has a positive for COVID-19 and received monoclonal antibodies.  

He came back to the emergency room last evening with worsening symptoms.  Chest 

x-ray reveals bilateral patchy interstitial and airspace infiltrates.  He is 

currently requiring 10 L high flow nasal cannula to maintain O2 saturations in 

the high 80s low 90s.  White count 6.6.  Hemoglobin 13.9.  Lymphocytes 0.3.  

Sodium 135.  Potassium 4.1.  Bicarb 21.  Creatinine 1.31.  Glucose 187.  

Ferritin 2822.  AST 89.  ALT 80.  LDH 1091.  C-reactive protein 21.0.  Initiated

on Decadron. 





On 12/10/2021 patient seen in follow-up on medical surgical floor, he is awake 

and alert, in no acute distress, overnight he wore 15 L high flow and n

onrebreather mask, he is currently off the nonrebreather mask and not he is on 

15 L high flow nasal cannula and he is maintaining O2 saturations at 93%, 

afebrile, hemodynamically stable.  However his oxygen requirements have 

significantly increased in last 24 hours.  His pro calcitonin level was sig

nificantly elevated at 9.75, and patient is not a candidate for Baricitinib in 

view of possibility of underlying bacterial infection.  Patient states she is 

coughing and bringing up some greenish colored phlegm at times, he is being 

started on Zosyn and vancomycin empirically, we will order blood cultures, 

sputum culture and urine cultures.  Clinically he looks fairly comfortable, no 

distress, lung sounds are revealing coarse diffuse rhonchi and rales.  No 

altered mentation, no fever.  He continues on Lovenox 40 mg twice daily, and 

Decadron 6 mg twice daily.





On 12/12/2021 patient seen in follow-up on medical surgical floor, is currently 

on 15 L of oxygen, he is not using the nonrebreather mask, his pulse ox is 90-

95%, patient has been self repositioning in bed, he states his breathing is 

about the same, maybe slightly improved, lung sounds are positive for fine rales

bilaterally, he remains on Zosyn for empiric antibiotic coverage, Decadron 6 

mggram twice daily, vancomycin has been discontinued.  Patient is afebrile, 

hemodynamically has been stable.  Tolerating oral intake, his labs have been 

reviewed today, his white blood cell count is 7.42, hemoglobin is 13.2, d-dimer 

is 1.4, electrolytes and renal profile are within normal limits, his liver 

enzymes have worsened on today's labs, and AST is up to 108, ALT is 182, 

alkaline phosphatase is actually improved and is down to 150.  His LDH is 

improved and is down to 413, and CRP is 4.1, improved compared to admission 

levels.  Pro calcitonin level is improving and is down to 1.34, urinalysis did 

not show evidence of infection.





On 12/13/2021 patient seen in follow-up on medical surgical floor, he remains on

high flow oxygen at 15 L, and 100% nonrebreather mask, his been pretty 

successful with self repositioning in bed, his pulse ox is around 96% on both 

oxygen delivery devices, at times it is near 99%, patient is mildly short of 

breath with any exertion, she does desaturate with any exertion, into the low 

80s recovers.  Other vitals have been stable, his been afebrile, has cough and 

congestion have improved, history and culture was sent, showing few PMNs and a 

few gram-positive cocci.  Patient remains on empiric antibiotic coverage with 

Zosyn only, vancomycin has been discontinued, blood cultures have shown no kodi

wth.  Follow-up pro-calcitonin will be obtained for tomorrow, physical exam 

reveals a few scattered rhonchi, improved from yesterday's exam.  No complaint 

of chest pain, no hemoptysis, no nausea vomiting or diarrhea.








Objective





- Vital Signs


Vital signs: 


                                   Vital Signs











Temp  98.2 F   12/13/21 14:00


 


Pulse  63   12/13/21 14:00


 


Resp  20   12/13/21 14:00


 


BP  112/71   12/13/21 14:00


 


Pulse Ox  99   12/13/21 14:00








                                 Intake & Output











 12/12/21 12/13/21 12/13/21





 18:59 06:59 18:59


 


Output Total  300 


 


Balance  -300 


 


Output:   


 


  Urine  300 


 


Other:   


 


  Voiding Method Urinal Urinal Urinal


 


  # Voids 2  


 


  # Bowel Movements 1  














- Exam


 GENERAL EXAM: Alert, very pleasant, 56-year-old white male, on 15 L of oxygen 

and non-rebreather mask with a pulse ox is 96% comfortable in no apparent 

distress.


HEAD: Normocephalic/atraumatic.


EYES: Normal reaction of pupils, equal size.  Conjunctiva pink, sclera white.


NOSE: Clear with pink turbinates.


THROAT: No erythema or exudates.


NECK: No masses, no JVD, no thyroid enlargement, no adenopathy.


CHEST: No chest wall deformity.  Symmetrical expansion. 


LUNGS: Equal air entry with diffuse crackles, and rhonchi, no wheezing


CVS: Regular rate and rhythm, normal S1 and S2, no gallops, no murmurs, no rubs


ABDOMEN: Soft, nontender.  No hepatosplenomegaly, normal bowel sounds, no 

guarding or rigidity.


EXTREMITIES: No clubbing, no edema, no cyanosis, 2+ pulses and upper and lower 

extremities.


MUSCULOSKELETAL: Muscle strength and tone normal.


SPINE: No scoliosis or deformity


SKIN: No rashes


CENTRAL NERVOUS SYSTEM: Alert and oriented -3.  No focal deficits, tone is 

normal in all 4 extremities.


PSYCHIATRIC: Alert and oriented -3.  Appropriate affect.  Intact judgment and 

insight.











- Labs


CBC & Chem 7: 


                                 12/12/21 06:17





                                 12/13/21 06:00


Labs: 


                  Abnormal Lab Results - Last 24 Hours (Table)











  12/12/21 12/13/21 12/13/21 Range/Units





  20:25 07:01 11:53 


 


POC Glucose (mg/dL)  101 H  106 H  101 H  (75-99)  mg/dL








                      Microbiology - Last 24 Hours (Table)











 12/12/21 12:26 Blood Culture - Preliminary





 Blood    No Growth after 24 hours


 


 12/12/21 12:29 Blood Culture - Preliminary





 Blood    No Growth after 24 hours


 


 12/12/21 14:00 Gram Stain - Preliminary





 Sputum Sputum Culture - Preliminary














Assessment and Plan


Plan: 


 Assessment:





#1.  Acute hypoxic respiratory failure secondary to acute COVID-19 pneumonia.  

Patient is status post monoclonal antibody infusion on 12/07/2021.  Patient was 

not a candidate for Remdesivir related to severe hypoxic respiratory failure on 

presentation requiring 10 L high flow nasal cannula.  Today he is on 15 L and 

100% nonrebreather mask, his FiO2 requirements have increased but patient is not

a candidate for Baricitinib related to possibility of underlying bacterial 

infection





#2.  Elevated pro-calcitonin, rule out possibility of bacterial superinfection, 

patient has been empirically started on Zosyn and vancomycin





#3.  Elevated inflammatory markers related to acute COVID-19 pneumonia and 

possibly a bacterial pneumonia





#4.  Elevated LFTs related to COVID-19 pneumonia, slightly worsened





#5.  Elevated d-dimer, venous Doppler of bilateral lower extremities was 

negative for DVT





#6.  History of fibromyalgia





#7.  Depression





#8.  Never smoker





#9.  Acute kidney injury possibly related to Covid 19 related ATN, 

Zoloftresolved





#10.  Hypothyroidism





Plan:





Continue current medical treatment


Continue Decadron, continue Lovenox


Continue Zosyn for empiric antibiotic coverage


Pocalcitonin level is improved but still elevated


Blood cultures and sputum culture have been sent,  blood culture has shown no 

growth, sputum culture showed a few PMNs, and a few gram-positive cocci


Await final cultures, follow-up pro-calcitonin and chest x-ray for tomorrow


Continue encouraging self-proning and repositioning


Will continue to follow





I performed a history & physical examination of the patient and discussed their 

management with my nurse practitioner, Lissy rBody.  I reviewed the nurse 

practitioner's note and agree with the documented findings and plan of care.  

Lung sounds are positive for dim breath sounds throughout the lung fields.  The 

findings and the impression was discussed with the patient.  I attest to the 

documentation by the nurse practitioner. 








Time with Patient: Less than 30

## 2021-12-13 NOTE — P.PN
Subjective


Progress Note Date: 12/13/21





History of present illness


56-year-old  male with past medical history of fibromyalgia comes in to

emergency room with symptoms of nausea, vomiting, dizziness and sweating feeling

weak in with runny nose loss of taste and hence now for the past 1 week.  He 

presented on 12/7 was found to have positive COVID results.  Received his 

monoclonal antibodies.  He Came back to the emergency room because of worsening 

of symptoms.Vitals reviewed patient afebrile pulse 80 respiratory rate 20 blood 

pressure 138/82 saturating at 88% on 10 L labs reviewed WBC 6.6 hemoglobin 13.9 

d-dimer is elevated at 0.89, sodium 135 bicarb 21 BUN 16 creatinine 1.3 glucose 

187 ferritin 2822 AST 89 ALT 80 alkaline phosphatase 225 LDH 1091 and CRP 21 and

albumin 3.3


X-ray suggestive of bilateral patchy pneumonia.  Both interstitial and airspace 

infiltrate


Pulmonary clinic consulted for COVID pneumonia


Patient placed on Lovenox 40 subcu twice a day, vitamin C, D and zinc.  

Dexamethasone initiated at 6 mg twice a day IV


Patient is a candidate of Baritinib and will discuss about initiating it with 

pulmonary


Venous Doppler ordered





12/10: [facility].  He continues to have 

dyspnea and is on 15 L high flow nasal cannula and nonrebreather with pulse ox 

of 87 and 95%.  He's been afebrile, heart rate in the 60s and 70s, blood 

pressure 127/73.  Pro-calcitonin came back high at 9.75 and we started the 

patient on Zosyn and vancomycin for bacterial pneumonia coverage.  Patient has 

coarse crackles bilaterally.  No lower extremity edema.  He has been seen and 

followed by pulmonary medicine.  Patient is not a candidate for Baricitinib.  

Patient is continued on Decadron, Lovenox and vitamin supplements.





12/11: Is found sitting up in the edge of the bed.  He is currently not on a 

Ventimask.  However his pulse oxing still around 88 to 91 % on Ventimask or 15 L

high flow nasal cannula. Patient continues to have dyspnea with activity and 

speaking. Patient states that he is feeling much better compared to yesterday.  

Continues to have a cough.  No lower extremity edema.  He has rhonchi to the 

bases bilaterally.  He is currently on Decadron Lovenox and vitamin supplements.

 Patient remains afebrile, heart rate 62, respirations 17, blood pressure 118/68

pulse ox 93% on 15 L high flow nasal cannula





12/12: Patient was up to the bathroom showering today.  Awaiting inflammation 

markers today.  Patient still requires 10 L of O2 with a pulse ox of 90%.  

Patient continues to have crackles to the bilateral bases.  Dyspnea with 

activity and speaking.  Patient states he is feeling better however he is very 

tired.  Continues to have cough no lower extremity edema.  He still currently on

Decadron, Lovenox, and vitamin supplements.  Patient remains afebrile, heart 

rate 58, respirations 17, blood pressure 118/89,





12/13: Patient is still on high flow nasal cannula 15 L and nonrebreather with 

pulse ox of 89 and 99%.  He's been afebrile, heart rate 73, blood pressure 

120/72.  Creatinine 1.06.  Blood sugars running between 101 and 106.  Sputum 

culture is in progress.  Patient is followed closely by pulmonary medicine.  

Patient is continued on dexamethasone, Lovenox, Zosyn and vitamin supplements.  

Patient has been encouraged to prone several hours daily.








ROS


Constitutional: Denies chills, denies fever, reports malaise, denies nausea and 

vomiting


Eyes: denies decreased vision, denies diplopia, denies discharge, denies pain


Ears: deny: decreased hearing


Ears, nose, mouth and throat: Denies dental pain, Denies headache, Denies nasal 

discharge, Denies nose pain


Cardiovascular: Denies chest pain, Denies decreased exercise tolerance, Denies 

edema, Denies high blood pressure, Denies irregular heart beat, Denies palp

itations, Denies paroxysmal nocturnal dyspnea, Denies rapid heart beat, Denies 

shortness of breath


Respiratory: Denies congestion, reports cough, Denies cough with sputum, reports

dyspnea, Denies home oxygen, Denies wheezing, dyspnea with minimal exertion


Gastrointestinal: Denies abdominal pain, Denies change in bowel habits, Denies 

coffee ground emesis, Denies early satiety, Denies excessive gas, Denies 

heartburn, Denies hematemesis, Denies hematochezia, Denies loss of appetite, 

endorses nausea and vomiting


Genitourinary: Denies dysuria, Denies flank pain, Denies kidney stones, Denies 

menorrhagia, Denies urgency, Denies urinary frequency


Musculoskeletal: Denies gait dysfunction, Denies limitation of motion, Denies 

morning stiffness, Denies muscle cramps


Integumentary: Denies rash, Denies wounds, Denies brittle nails, Denies change 

in hair/nails, Denies darkening of skin


Neurological: Denies balance difficulties, Denies change in speech, Denies 

double vision, Denies gait dysfunction, Denies loss of vision, Denies motor 

disturbance, Denies numbness, Denies paralysis, Denies paresthesias, Denies 

seizures


Psychiatric: Denies anxiety, Denies depression


Endocrine: Denies excessive sweating, Denies excessive thirst, Denies high blood

sugars, Denies palpitations


Hematologic/Lymphatic: Denies easy bruising, Denies lymphadenopathy





Physical exam


General Appearance: Alert, cooperative, no distress, appears stated age.  

Patient is resting in bed.


Neck HEENT: Supple, no lymphadenopathy, no thyroid enlargement, no carotid 

bruits.


Lungs: Bilateral crackles to the bases  no rhonchi, no deformity.


Chest Wall: Chest wall normal expansion with deep inspiration no tenderness and 

no deformity was found on exam, no costochondral pain or discomfort.


Heart: Regular rate and rhythm, S1, S2 normal, no murmur, rub or gallop.


Back: Symmetric, no curvature, ROM normal, no CVA tenderness.


Abdomen: Soft, non-tender, no rebound or rigidity, no hepatosplenomegaly.


Extremities: Extremities normal, atraumatic, no cyanosis or edema.


Pulses: 2+ and symmetric.


Skin: Skin color, texture, tugor normal, no rashes or lesions.


Neurologic: Alert oriented x3 cranial nerves II through XII intact, no motor 

deficit, no abnormal balance or gait





Assessment and plan


1. Acute hypoxic respiratory failure secondary to Covid 19 pneumonia.  Consult 

with pulmonary medicine appreciated.





2. Acute COVID pneumonia. Dexamethasone 6 mg IV twice a day,  Lovenox 40 subcu 

twice a day, Not a candidate for Baricitinib, Monitor inflammatory markers. 

Continue supplements vitamin C, D and zinc





3. Acute transaminitis Secondary to viral inflammation. Continue CMP monitoring





4. CK D3.  Continue to monitor patient's creatinine.  No acute kidney injury





5. History of fibromyalgia. continue gabapentin 800 3 times a day, Citalopram 40

mg by mouth daily, Tramadol 100 mg every 6 hours as needed





6. Insomnia. Continue Eszopiclone 3 mg daily at bedtime 





7. Hypothyroidism. Levothyroxine 50 g by mouth daily





8. DVT prophylaxis. Lovenox 40 subcu twice a day





9. GI prophylaxis. Pepcid 20 mg by mouth daily





Discharge Plan: 


more than likely home





Impression and plan of care have been directed as dictated by the signing 

physician.  Babita Hawkins nurse practitioner acting as scribe for signing 

physician.








Objective





- Vital Signs


Vital signs: 


                                   Vital Signs











Temp  98.0 F   12/13/21 10:00


 


Pulse  73   12/13/21 10:00


 


Resp  22   12/13/21 10:00


 


BP  120/72   12/13/21 10:00


 


Pulse Ox  99   12/13/21 10:00








                                 Intake & Output











 12/12/21 12/13/21 12/13/21





 18:59 06:59 18:59


 


Output Total  300 


 


Balance  -300 


 


Output:   


 


  Urine  300 


 


Other:   


 


  Voiding Method Urinal Urinal Urinal


 


  # Voids 2  


 


  # Bowel Movements 1  














- Labs


CBC & Chem 7: 


                                 12/12/21 06:17





                                 12/13/21 06:00


Labs: 


                  Abnormal Lab Results - Last 24 Hours (Table)











  12/12/21 12/12/21 12/13/21 Range/Units





  11:35 20:25 07:01 


 


POC Glucose (mg/dL)  106 H  101 H  106 H  (75-99)  mg/dL








                      Microbiology - Last 24 Hours (Table)











 12/12/21 14:00 Gram Stain - Preliminary





 Sputum Sputum Culture - Preliminary

## 2021-12-14 LAB
ALBUMIN SERPL-MCNC: 3.1 G/DL (ref 3.8–4.9)
ALBUMIN/GLOB SERPL: 1.12 G/DL (ref 1.6–3.17)
ALP SERPL-CCNC: 130 U/L (ref 41–126)
ALT SERPL-CCNC: 199 U/L (ref 10–49)
ANION GAP SERPL CALC-SCNC: 14.5 MMOL/L (ref 10–18)
AST SERPL-CCNC: 84 U/L (ref 14–35)
BUN SERPL-SCNC: 24.6 MG/DL (ref 9–27)
BUN/CREAT SERPL: 25.81 RATIO (ref 12–20)
CALCIUM SPEC-MCNC: 8.6 MG/DL (ref 8.7–10.3)
CHLORIDE SERPL-SCNC: 104 MMOL/L (ref 96–109)
CO2 SERPL-SCNC: 18.7 MMOL/L (ref 20–27.5)
GLOBULIN SER CALC-MCNC: 2.7 G/DL (ref 1.6–3.3)
GLUCOSE BLD-MCNC: 112 MG/DL (ref 75–99)
GLUCOSE BLD-MCNC: 114 MG/DL (ref 75–99)
GLUCOSE BLD-MCNC: 116 MG/DL (ref 75–99)
GLUCOSE BLD-MCNC: 97 MG/DL (ref 75–99)
GLUCOSE SERPL-MCNC: 124 MG/DL (ref 70–110)
LDH SPEC-CCNC: 401 U/L (ref 120–246)
POTASSIUM SERPL-SCNC: 4.8 MMOL/L (ref 3.5–5.5)
PROT SERPL-MCNC: 5.8 G/DL (ref 6.2–8.2)
SODIUM SERPL-SCNC: 137 MMOL/L (ref 135–145)

## 2021-12-14 RX ADMIN — GABAPENTIN SCH MG: 400 CAPSULE ORAL at 20:26

## 2021-12-14 RX ADMIN — ALBUTEROL SULFATE SCH PUFF: 90 AEROSOL, METERED RESPIRATORY (INHALATION) at 13:08

## 2021-12-14 RX ADMIN — TEMAZEPAM PRN MG: 15 CAPSULE ORAL at 20:26

## 2021-12-14 RX ADMIN — ALBUTEROL SULFATE SCH PUFF: 90 AEROSOL, METERED RESPIRATORY (INHALATION) at 10:00

## 2021-12-14 RX ADMIN — ENOXAPARIN SODIUM SCH MG: 40 INJECTION SUBCUTANEOUS at 20:27

## 2021-12-14 RX ADMIN — LEVOTHYROXINE SODIUM SCH MCG: 50 TABLET ORAL at 05:09

## 2021-12-14 RX ADMIN — PIPERACILLIN AND TAZOBACTAM SCH MLS/HR: 3; .375 INJECTION, POWDER, FOR SOLUTION INTRAVENOUS at 21:20

## 2021-12-14 RX ADMIN — CITALOPRAM HYDROBROMIDE SCH MG: 20 TABLET ORAL at 08:03

## 2021-12-14 RX ADMIN — GABAPENTIN SCH MG: 400 CAPSULE ORAL at 16:39

## 2021-12-14 RX ADMIN — INSULIN ASPART SCH: 100 INJECTION, SOLUTION INTRAVENOUS; SUBCUTANEOUS at 20:17

## 2021-12-14 RX ADMIN — PIPERACILLIN AND TAZOBACTAM SCH MLS/HR: 3; .375 INJECTION, POWDER, FOR SOLUTION INTRAVENOUS at 14:14

## 2021-12-14 RX ADMIN — OXYCODONE HYDROCHLORIDE AND ACETAMINOPHEN SCH MG: 500 TABLET ORAL at 08:04

## 2021-12-14 RX ADMIN — PIPERACILLIN AND TAZOBACTAM SCH MLS/HR: 3; .375 INJECTION, POWDER, FOR SOLUTION INTRAVENOUS at 05:09

## 2021-12-14 RX ADMIN — INSULIN ASPART SCH: 100 INJECTION, SOLUTION INTRAVENOUS; SUBCUTANEOUS at 07:56

## 2021-12-14 RX ADMIN — DEXTROSE SCH MG: 50 INJECTION, SOLUTION INTRAVENOUS at 20:27

## 2021-12-14 RX ADMIN — Medication SCH MCG: at 08:04

## 2021-12-14 RX ADMIN — FAMOTIDINE SCH MG: 20 TABLET, FILM COATED ORAL at 08:04

## 2021-12-14 RX ADMIN — ALBUTEROL SULFATE SCH PUFF: 90 AEROSOL, METERED RESPIRATORY (INHALATION) at 21:42

## 2021-12-14 RX ADMIN — DEXTROSE SCH MG: 50 INJECTION, SOLUTION INTRAVENOUS at 08:04

## 2021-12-14 RX ADMIN — INSULIN ASPART SCH: 100 INJECTION, SOLUTION INTRAVENOUS; SUBCUTANEOUS at 12:04

## 2021-12-14 RX ADMIN — ENOXAPARIN SODIUM SCH MG: 40 INJECTION SUBCUTANEOUS at 08:03

## 2021-12-14 RX ADMIN — Medication SCH MG: at 08:03

## 2021-12-14 RX ADMIN — INSULIN ASPART SCH: 100 INJECTION, SOLUTION INTRAVENOUS; SUBCUTANEOUS at 16:43

## 2021-12-14 RX ADMIN — GABAPENTIN SCH MG: 400 CAPSULE ORAL at 08:03

## 2021-12-14 NOTE — XR
EXAMINATION TYPE: XR chest 1V portable

 

DATE OF EXAM: 12/14/2021

 

COMPARISON: 12/11/2021

 

HISTORY: Cough

 

TECHNIQUE: Single frontal view of the chest is obtained.

 

FINDINGS:  Bilateral areas of interstitial and alveolar infiltrate are stable. Tiny pleural effusions
. Heart size stable. No pneumothorax.

 

IMPRESSION:  Stable bilateral infiltrates.

## 2021-12-14 NOTE — P.PN
Subjective


Progress Note Date: 12/14/21


Principal diagnosis: 





COVID-19 pneumonia





This is a very pleasant 56-year-old male patient with a known history of 

fibromyalgia.  Nonsmoker.  Approximate 1 week ago he developed symptoms of 

nausea vomiting diarrhea dizziness and sweating.  He presented here on 

12/07/2021 and has a positive for COVID-19 and received monoclonal antibodies.  

He came back to the emergency room last evening with worsening symptoms.  Chest 

x-ray reveals bilateral patchy interstitial and airspace infiltrates.  He is 

currently requiring 10 L high flow nasal cannula to maintain O2 saturations in 

the high 80s low 90s.  White count 6.6.  Hemoglobin 13.9.  Lymphocytes 0.3.  

Sodium 135.  Potassium 4.1.  Bicarb 21.  Creatinine 1.31.  Glucose 187.  

Ferritin 2822.  AST 89.  ALT 80.  LDH 1091.  C-reactive protein 21.0.  Initiated

on Decadron. 





On 12/10/2021 patient seen in follow-up on medical surgical floor, he is awake 

and alert, in no acute distress, overnight he wore 15 L high flow and nonrebrea

ther mask, he is currently off the nonrebreather mask and not he is on 15 L high

flow nasal cannula and he is maintaining O2 saturations at 93%, afebrile, 

hemodynamically stable.  However his oxygen requirements have significantly 

increased in last 24 hours.  His pro calcitonin level was significantly elevated

at 9.75, and patient is not a candidate for Baricitinib in view of possibility 

of underlying bacterial infection.  Patient states she is coughing and bringing 

up some greenish colored phlegm at times, he is being started on Zosyn and 

vancomycin empirically, we will order blood cultures, sputum culture and urine 

cultures.  Clinically he looks fairly comfortable, no distress, lung sounds are 

revealing coarse diffuse rhonchi and rales.  No altered mentation, no fever.  He

continues on Lovenox 40 mg twice daily, and Decadron 6 mg twice daily.





The patient is seen today 12/11/2021 in follow-up on the regular medical floor. 

He remains awake and alert in no acute distress.  He is currently on 15 L high 

flow nasal cannula as a nonrebreather mask.  Chest x-ray showing some 

improvement in aeration.  He was initiated on vancomycin and Zosyn per medicine.

 Pro calcitonin was 9.7.  White count 8.2.  Hemoglobin 13.9.  D-dimer 1.10.  

Sodium 140.  Potassium 4.2.  Creatinine 1.21.  AST 11.  .  LDH 1001.  C-

reactive protein 1.7.  No cultures resulted.








The patient is seen today 12/14/2021 in follow-up on the regular medical floor. 

He is currently resting in bed.  Laying on his right side.  He is now on AirVo. 

High flow oxygen at 60 L and 80% FiO2 to maintain O2 saturations in the 80s low 

90s.  This x-ray continues to show bilateral areas of interstitial and alveolar 

infiltrates.  No pneumothorax.  Blood cultures revealing no growth.  Sputum with

Candida.  D-dimer 1.53.  Sodium 137.  Potassium 4.8.  Creatinine 1.0.  AST 84.  

.  .  C-reactive protein 7.0.  He is continued on Decadron, 

Lovenox, vitamin supplements.  Antibiotics in the form of Zosyn. 








Objective





- Vital Signs


Vital signs: 


                                   Vital Signs











Temp  98.1 F   12/14/21 09:45


 


Pulse  59 L  12/14/21 09:52


 


Resp  22   12/14/21 09:52


 


BP  144/69   12/14/21 09:45


 


Pulse Ox  89 L  12/14/21 10:02








                                 Intake & Output











 12/13/21 12/14/21 12/14/21





 18:59 06:59 18:59


 


Other:   


 


  Voiding Method Urinal Urinal Urinal


 


  # Voids 2 2 














- Exam





GENERAL EXAM: Alert, very pleasant, 56-year-old male patient, on AirVo high flow

oxygen at 60 L and 80% FiO2, fairly comfortable in no apparent distress.


HEAD: Normocephalic/atraumatic.


EYES: Normal reaction of pupils, equal size.  Conjunctiva pink, sclera white.


NOSE: Clear with pink turbinates.


THROAT: No erythema or exudates.


NECK: No masses, no JVD, no thyroid enlargement, no adenopathy.


CHEST: No chest wall deformity.  Symmetrical expansion. 


LUNGS: Equal air entry with coarse crackles in the bilateral bases


CVS: Regular rate and rhythm, normal S1 and S2, no gallops, no murmurs, no rubs


ABDOMEN: Soft, nontender.  No hepatosplenomegaly, normal bowel sounds, no 

guarding or rigidity.


EXTREMITIES: No clubbing, no edema, no cyanosis, 2+ pulses and upper and lower 

extremities.


MUSCULOSKELETAL: Muscle strength and tone normal.


SPINE: No scoliosis or deformity


SKIN: No rashes


CENTRAL NERVOUS SYSTEM: No focal deficits, tone is normal in all 4 extremities.


PSYCHIATRIC: Alert and oriented -3.  Appropriate affect.  Intact judgment and 

insight.





- Labs


CBC & Chem 7: 


                                 12/12/21 06:17





                                 12/14/21 05:35


Labs: 


                  Abnormal Lab Results - Last 24 Hours (Table)











  12/13/21 12/13/21 12/13/21 Range/Units





  06:00 16:49 19:49 


 


D-Dimer     (<0.60)  mg/L FEU


 


Carbon Dioxide     (20.0-27.5)  mmol/L


 


BUN/Creatinine Ratio     (12.00-20.00)  Ratio


 


Glucose     ()  mg/dL


 


POC Glucose (mg/dL)   109 H  112 H  (75-99)  mg/dL


 


Calcium     (8.7-10.3)  mg/dL


 


AST     (14-35)  U/L


 


ALT     (10-49)  U/L


 


Alkaline Phosphatase     ()  U/L


 


Lactate Dehydrogenase     (120-246)  U/L


 


C-Reactive Protein     (0.00-0.80)  mg/dL


 


Total Protein     (6.2-8.2)  g/dL


 


Albumin     (3.8-4.9)  g/dL


 


Albumin/Globulin Ratio     (1.60-3.17)  g/dL


 


Procalcitonin  0.44 H    (0.02-0.09)  ng/mL














  12/14/21 12/14/21 12/14/21 Range/Units





  05:35 05:35 06:45 


 


D-Dimer  1.53 H    (<0.60)  mg/L FEU


 


Carbon Dioxide   18.7 L   (20.0-27.5)  mmol/L


 


BUN/Creatinine Ratio   25.81 H   (12.00-20.00)  Ratio


 


Glucose   124 H   ()  mg/dL


 


POC Glucose (mg/dL)    114 H  (75-99)  mg/dL


 


Calcium   8.6 L   (8.7-10.3)  mg/dL


 


AST   84 H   (14-35)  U/L


 


ALT   199 H   (10-49)  U/L


 


Alkaline Phosphatase   130 H   ()  U/L


 


Lactate Dehydrogenase   401 H   (120-246)  U/L


 


C-Reactive Protein   7.00 H   (0.00-0.80)  mg/dL


 


Total Protein   5.8 L   (6.2-8.2)  g/dL


 


Albumin   3.1 L   (3.8-4.9)  g/dL


 


Albumin/Globulin Ratio   1.12 L   (1.60-3.17)  g/dL


 


Procalcitonin     (0.02-0.09)  ng/mL














  12/14/21 Range/Units





  12:02 


 


D-Dimer   (<0.60)  mg/L FEU


 


Carbon Dioxide   (20.0-27.5)  mmol/L


 


BUN/Creatinine Ratio   (12.00-20.00)  Ratio


 


Glucose   ()  mg/dL


 


POC Glucose (mg/dL)  112 H  (75-99)  mg/dL


 


Calcium   (8.7-10.3)  mg/dL


 


AST   (14-35)  U/L


 


ALT   (10-49)  U/L


 


Alkaline Phosphatase   ()  U/L


 


Lactate Dehydrogenase   (120-246)  U/L


 


C-Reactive Protein   (0.00-0.80)  mg/dL


 


Total Protein   (6.2-8.2)  g/dL


 


Albumin   (3.8-4.9)  g/dL


 


Albumin/Globulin Ratio   (1.60-3.17)  g/dL


 


Procalcitonin   (0.02-0.09)  ng/mL








                      Microbiology - Last 24 Hours (Table)











 12/12/21 14:00 Gram Stain - Final





 Sputum Sputum Culture - Final





    Candida albicans


 


 12/12/21 12:26 Blood Culture - Preliminary





 Blood    No Growth after 24 hours


 


 12/12/21 12:29 Blood Culture - Preliminary





 Blood    No Growth after 24 hours














Assessment and Plan


Assessment: 





1 Acute hypoxemic respiratory failure secondary to acute COVID-19 pneumonia.  

Not vaccinated.  Received monoclonal antibodies on 12/07/2021.  On AirVo high 

flow oxygen at 60 L and 80% FiO2. Did not qualify for Remdesivir.  Pro-ca

lcitonin greater than 9, trending down.  Cultures pending.  On antibiotics and 

not qualifying for Baricitinib.





2 Elevated inflammatory markers secondary to above





3 Mild transaminitis secondary to above





4 History of fibromyalgia





5 Hypothyroidism





Plan:





Chest x-ray and labs reviewed


Sputum culture with Candida only thus far


Pro-calcitonin 0.44, down from 9.75


Continue Zosyn for now


Continue Decadron, Lovenox, vitamin supplements


Continue bronchodilators, incentive spirometer


Titrate the FiO2 as tolerated


We will continue to follow and make further recommendations based on his 

clinical status





I, the cosigning physician, performed a history & physical examination of the p

atient. Lungs sounds crackles in the bilateral bases.  Maintaining good O2 

saturations in the 90s on AirVo high flow oxygen at 60 L and 80% FiO2.  I 

discussed the assessment and plan of care with my nurse practitioner, Dolores Chaves. I attest to the above note as dictated by her.

## 2021-12-14 NOTE — P.PN
Subjective


Progress Note Date: 12/14/21





History of present illness


56-year-old  male with past medical history of fibromyalgia comes in to

emergency room with symptoms of nausea, vomiting, dizziness and sweating feeling

weak in with runny nose loss of taste and hence now for the past 1 week.  He 

presented on 12/7 was found to have positive COVID results.  Received his 

monoclonal antibodies.  He Came back to the emergency room because of worsening 

of symptoms.Vitals reviewed patient afebrile pulse 80 respiratory rate 20 blood 

pressure 138/82 saturating at 88% on 10 L labs reviewed WBC 6.6 hemoglobin 13.9 

d-dimer is elevated at 0.89, sodium 135 bicarb 21 BUN 16 creatinine 1.3 glucose 

187 ferritin 2822 AST 89 ALT 80 alkaline phosphatase 225 LDH 1091 and CRP 21 and

albumin 3.3


X-ray suggestive of bilateral patchy pneumonia.  Both interstitial and airspace 

infiltrate


Pulmonary clinic consulted for COVID pneumonia


Patient placed on Lovenox 40 subcu twice a day, vitamin C, D and zinc.  

Dexamethasone initiated at 6 mg twice a day IV


Patient is a candidate of Baritinib and will discuss about initiating it with 

pulmonary


Venous Doppler ordered





12/10: [facility].  He continues to have 

dyspnea and is on 15 L high flow nasal cannula and nonrebreather with pulse ox 

of 87 and 95%.  He's been afebrile, heart rate in the 60s and 70s, blood 

pressure 127/73.  Pro-calcitonin came back high at 9.75 and we started the 

patient on Zosyn and vancomycin for bacterial pneumonia coverage.  Patient has 

coarse crackles bilaterally.  No lower extremity edema.  He has been seen and 

followed by pulmonary medicine.  Patient is not a candidate for Baricitinib.  

Patient is continued on Decadron, Lovenox and vitamin supplements.





12/11: Is found sitting up in the edge of the bed.  He is currently not on a 

Ventimask.  However his pulse oxing still around 88 to 91 % on Ventimask or 15 L

high flow nasal cannula. Patient continues to have dyspnea with activity and 

speaking. Patient states that he is feeling much better compared to yesterday.  

Continues to have a cough.  No lower extremity edema.  He has rhonchi to the 

bases bilaterally.  He is currently on Decadron Lovenox and vitamin supplements.

 Patient remains afebrile, heart rate 62, respirations 17, blood pressure 118/68

pulse ox 93% on 15 L high flow nasal cannula





12/12: Patient was up to the bathroom showering today.  Awaiting inflammation 

markers today.  Patient still requires 10 L of O2 with a pulse ox of 90%.  

Patient continues to have crackles to the bilateral bases.  Dyspnea with 

activity and speaking.  Patient states he is feeling better however he is very 

tired.  Continues to have cough no lower extremity edema.  He still currently on

Decadron, Lovenox, and vitamin supplements.  Patient remains afebrile, heart 

rate 58, respirations 17, blood pressure 118/89,





12/13: Patient is still on high flow nasal cannula 15 L and nonrebreather with 

pulse ox of 89 and 99%.  He's been afebrile, heart rate 73, blood pressure 

120/72.  Creatinine 1.06.  Blood sugars running between 101 and 106.  Sputum 

culture is in progress.  Patient is followed closely by pulmonary medicine.  

Patient is continued on dexamethasone, Lovenox, Zosyn and vitamin supplements.  

Patient has been encouraged to prone several hours daily.





12/14: Patient remains on nonrebreather mask and AirVo at 60 L, FiO2 of 85 with 

pulse ox of 88%.  Appears to have more difficulty with breathing.  Repeat chest 

x-ray reveals stable bilateral infiltrates.  He has been afebrile, heart rate 

59, blood pressure 102/59.  Repeat d-dimer 1.53.  Blood sugars are well 

controlled.





ROS


Constitutional: Denies chills, denies fever, reports malaise, denies nausea and 

vomiting, reports generalized fatigue


Eyes: denies decreased vision, denies diplopia, denies discharge, denies pain


Ears: deny: decreased hearing


Ears, nose, mouth and throat: Denies dental pain, Denies headache, Denies nasal 

discharge, Denies nose pain


Cardiovascular: Denies chest pain, Denies decreased exercise tolerance, Denies 

edema, Denies high blood pressure, Denies irregular heart beat, Denies 

palpitations, Denies paroxysmal nocturnal dyspnea, Denies rapid heart beat, 

Denies shortness of breath


Respiratory: Denies congestion, reports cough, Denies cough with sputum, reports

dyspnea, Denies home oxygen, Denies wheezing, dyspnea with minimal exertion


Gastrointestinal: Denies abdominal pain, Denies change in bowel habits, Denies 

coffee ground emesis, Denies early satiety, Denies excessive gas, Denies 

heartburn, Denies hematemesis, Denies hematochezia, Denies loss of appetite, 

endorses nausea and vomiting


Genitourinary: Denies dysuria, Denies flank pain, Denies kidney stones, Denies 

menorrhagia, Denies urgency, Denies urinary frequency


Musculoskeletal: Denies gait dysfunction, Denies limitation of motion, Denies 

morning stiffness, Denies muscle cramps


Integumentary: Denies rash, Denies wounds, Denies brittle nails, Denies change 

in hair/nails, Denies darkening of skin


Neurological: Denies balance difficulties, Denies change in speech, Denies 

double vision, Denies gait dysfunction, Denies loss of vision, Denies motor 

disturbance, Denies numbness, Denies paralysis, Denies paresthesias, Denies 

seizures


Psychiatric: Denies anxiety, Denies depression


Endocrine: Denies excessive sweating, Denies excessive thirst, Denies high blood

sugars, Denies palpitations


Hematologic/Lymphatic: Denies easy bruising, Denies lymphadenopathy





Physical exam


General Appearance: Alert, cooperative, mild to moderate distress, appears 

stated age.  Patient is resting in bed.


Neck HEENT: Supple, no lymphadenopathy, no thyroid enlargement, no carotid 

bruits.


Lungs: Bilateral crackles to the bases, no deformity.


Chest Wall: Chest wall normal expansion with deep inspiration no tenderness and 

no deformity was found on exam, no costochondral pain or discomfort.


Heart: Regular rate and rhythm, S1, S2 normal, no murmur, rub or gallop.


Back: Symmetric, no curvature, ROM normal, no CVA tenderness.


Abdomen: Soft, non-tender, no rebound or rigidity, no hepatosplenomegaly.


Extremities: Extremities normal, atraumatic, no cyanosis or edema.


Pulses: 2+ and symmetric.


Skin: Skin color, texture, tugor normal, no rashes or lesions.


Neurologic: Alert oriented x3 cranial nerves II through XII intact, no motor 

deficit, no abnormal balance or gait





Assessment and plan


1. Acute hypoxic respiratory failure secondary to Covid 19 pneumonia.  Consult 

with pulmonary medicine appreciated.  Oxygen therapy, currently on nonrebreather

mask and AirVo at 60 L, FiO2 of 85 with pulse ox of 88%.





2. Acute COVID pneumonia. Dexamethasone 6 mg IV twice a day,  Lovenox 40 subcu 

twice a day, Not a candidate for Baricitinib, Monitor inflammatory markers. 

Continue supplements vitamin C, D and zinc





3. Acute transaminitis Secondary to viral inflammation. Continue CMP monitoring





4. CK D3.  Continue to monitor patient's creatinine.  No acute kidney injury





5. History of fibromyalgia. continue gabapentin 800 3 times a day, Citalopram 40

mg by mouth daily, Tramadol 100 mg every 6 hours as needed





6. Insomnia. Continue Eszopiclone 3 mg daily at bedtime 





7. Hypothyroidism. Levothyroxine 50 g by mouth daily





8. DVT prophylaxis. Lovenox 40 subcu twice a day





9. GI prophylaxis. Pepcid 20 mg by mouth daily





Discharge Plan: 


more than likely home





Impression and plan of care have been directed as dictated by the signing 

physician.  Babita Hawkins nurse practitioner acting as scribe for signing 

physician.








Objective





- Vital Signs


Vital signs: 


                                   Vital Signs











Temp  97.7 F   12/14/21 06:00


 


Pulse  59 L  12/14/21 06:00


 


Resp  22   12/14/21 06:00


 


BP  102/59   12/14/21 06:00


 


Pulse Ox  88 L  12/14/21 06:03








                                 Intake & Output











 12/13/21 12/14/21 12/14/21





 18:59 06:59 18:59


 


Other:   


 


  Voiding Method Urinal Urinal 


 


  # Voids 2 2 














- Labs


CBC & Chem 7: 


                                 12/12/21 06:17





                                 12/13/21 06:00


Labs: 


                  Abnormal Lab Results - Last 24 Hours (Table)











  12/13/21 12/13/21 12/13/21 Range/Units





  06:00 11:53 16:49 


 


D-Dimer     (<0.60)  mg/L FEU


 


POC Glucose (mg/dL)   101 H  109 H  (75-99)  mg/dL


 


Procalcitonin  0.44 H    (0.02-0.09)  ng/mL














  12/13/21 12/14/21 12/14/21 Range/Units





  19:49 05:35 06:45 


 


D-Dimer   1.53 H   (<0.60)  mg/L FEU


 


POC Glucose (mg/dL)  112 H   114 H  (75-99)  mg/dL


 


Procalcitonin     (0.02-0.09)  ng/mL








                      Microbiology - Last 24 Hours (Table)











 12/12/21 14:00 Gram Stain - Final





 Sputum Sputum Culture - Final





    Candida albicans


 


 12/12/21 12:26 Blood Culture - Preliminary





 Blood    No Growth after 24 hours


 


 12/12/21 12:29 Blood Culture - Preliminary





 Blood    No Growth after 24 hours

## 2021-12-15 LAB — GLUCOSE BLD-MCNC: 102 MG/DL (ref 75–99)

## 2021-12-15 RX ADMIN — GABAPENTIN SCH MG: 400 CAPSULE ORAL at 08:41

## 2021-12-15 RX ADMIN — PIPERACILLIN AND TAZOBACTAM SCH MLS/HR: 3; .375 INJECTION, POWDER, FOR SOLUTION INTRAVENOUS at 05:23

## 2021-12-15 RX ADMIN — INSULIN ASPART SCH: 100 INJECTION, SOLUTION INTRAVENOUS; SUBCUTANEOUS at 08:32

## 2021-12-15 RX ADMIN — Medication SCH MCG: at 08:41

## 2021-12-15 RX ADMIN — ALBUTEROL SULFATE SCH PUFF: 90 AEROSOL, METERED RESPIRATORY (INHALATION) at 12:20

## 2021-12-15 RX ADMIN — OXYCODONE HYDROCHLORIDE AND ACETAMINOPHEN SCH MG: 500 TABLET ORAL at 08:41

## 2021-12-15 RX ADMIN — ALBUTEROL SULFATE SCH PUFF: 90 AEROSOL, METERED RESPIRATORY (INHALATION) at 08:26

## 2021-12-15 RX ADMIN — FAMOTIDINE SCH MG: 20 TABLET, FILM COATED ORAL at 08:41

## 2021-12-15 RX ADMIN — ENOXAPARIN SODIUM SCH MG: 40 INJECTION SUBCUTANEOUS at 08:49

## 2021-12-15 RX ADMIN — PIPERACILLIN AND TAZOBACTAM SCH MLS/HR: 3; .375 INJECTION, POWDER, FOR SOLUTION INTRAVENOUS at 21:50

## 2021-12-15 RX ADMIN — PIPERACILLIN AND TAZOBACTAM SCH MLS/HR: 3; .375 INJECTION, POWDER, FOR SOLUTION INTRAVENOUS at 08:45

## 2021-12-15 RX ADMIN — LEVOTHYROXINE SODIUM SCH MCG: 50 TABLET ORAL at 05:23

## 2021-12-15 RX ADMIN — DEXTROSE SCH MG: 50 INJECTION, SOLUTION INTRAVENOUS at 08:40

## 2021-12-15 RX ADMIN — DEXTROSE SCH MG: 50 INJECTION, SOLUTION INTRAVENOUS at 20:45

## 2021-12-15 RX ADMIN — GABAPENTIN SCH MG: 400 CAPSULE ORAL at 20:44

## 2021-12-15 RX ADMIN — GABAPENTIN SCH MG: 400 CAPSULE ORAL at 17:44

## 2021-12-15 RX ADMIN — ALBUTEROL SULFATE SCH PUFF: 90 AEROSOL, METERED RESPIRATORY (INHALATION) at 20:26

## 2021-12-15 RX ADMIN — TEMAZEPAM PRN MG: 15 CAPSULE ORAL at 20:44

## 2021-12-15 RX ADMIN — ENOXAPARIN SODIUM SCH MG: 40 INJECTION SUBCUTANEOUS at 20:44

## 2021-12-15 RX ADMIN — Medication SCH MG: at 08:41

## 2021-12-15 RX ADMIN — CITALOPRAM HYDROBROMIDE SCH MG: 20 TABLET ORAL at 08:41

## 2021-12-15 NOTE — P.PN
Subjective


Progress Note Date: 12/15/21


Principal diagnosis: 


 Dyspnea, hypoxia, COVID-19





This is a very pleasant 56-year-old male patient with a known history of 

fibromyalgia.  Nonsmoker.  Approximate 1 week ago he developed symptoms of 

nausea vomiting diarrhea dizziness and sweating.  He presented here on 

12/07/2021 and has a positive for COVID-19 and received monoclonal antibodies.  

He came back to the emergency room last evening with worsening symptoms.  Chest 

x-ray reveals bilateral patchy interstitial and airspace infiltrates.  He is 

currently requiring 10 L high flow nasal cannula to maintain O2 saturations in 

the high 80s low 90s.  White count 6.6.  Hemoglobin 13.9.  Lymphocytes 0.3.  

Sodium 135.  Potassium 4.1.  Bicarb 21.  Creatinine 1.31.  Glucose 187.  

Ferritin 2822.  AST 89.  ALT 80.  LDH 1091.  C-reactive protein 21.0.  Initiated

on Decadron. 





On 12/10/2021 patient seen in follow-up on medical surgical floor, he is awake 

and alert, in no acute distress, overnight he wore 15 L high flow and n

onrebreather mask, he is currently off the nonrebreather mask and not he is on 

15 L high flow nasal cannula and he is maintaining O2 saturations at 93%, 

afebrile, hemodynamically stable.  However his oxygen requirements have 

significantly increased in last 24 hours.  His pro calcitonin level was sig

nificantly elevated at 9.75, and patient is not a candidate for Baricitinib in 

view of possibility of underlying bacterial infection.  Patient states she is 

coughing and bringing up some greenish colored phlegm at times, he is being 

started on Zosyn and vancomycin empirically, we will order blood cultures, 

sputum culture and urine cultures.  Clinically he looks fairly comfortable, no 

distress, lung sounds are revealing coarse diffuse rhonchi and rales.  No 

altered mentation, no fever.  He continues on Lovenox 40 mg twice daily, and 

Decadron 6 mg twice daily.





On 12/12/2021 patient seen in follow-up on medical surgical floor, is currently 

on 15 L of oxygen, he is not using the nonrebreather mask, his pulse ox is 90-

95%, patient has been self repositioning in bed, he states his breathing is 

about the same, maybe slightly improved, lung sounds are positive for fine rales

bilaterally, he remains on Zosyn for empiric antibiotic coverage, Decadron 6 

mggram twice daily, vancomycin has been discontinued.  Patient is afebrile, 

hemodynamically has been stable.  Tolerating oral intake, his labs have been 

reviewed today, his white blood cell count is 7.42, hemoglobin is 13.2, d-dimer 

is 1.4, electrolytes and renal profile are within normal limits, his liver 

enzymes have worsened on today's labs, and AST is up to 108, ALT is 182, 

alkaline phosphatase is actually improved and is down to 150.  His LDH is 

improved and is down to 413, and CRP is 4.1, improved compared to admission 

levels.  Pro calcitonin level is improving and is down to 1.34, urinalysis did 

not show evidence of infection.





On 12/13/2021 patient seen in follow-up on medical surgical floor, he remains on

high flow oxygen at 15 L, and 100% nonrebreather mask, his been pretty 

successful with self repositioning in bed, his pulse ox is around 96% on both 

oxygen delivery devices, at times it is near 99%, patient is mildly short of 

breath with any exertion, she does desaturate with any exertion, into the low 

80s recovers.  Other vitals have been stable, his been afebrile, has cough and 

congestion have improved, history and culture was sent, showing few PMNs and a 

few gram-positive cocci.  Patient remains on empiric antibiotic coverage with 

Zosyn only, vancomycin has been discontinued, blood cultures have shown no kodi

wth.  Follow-up pro-calcitonin will be obtained for tomorrow, physical exam 

reveals a few scattered rhonchi, improved from yesterday's exam.  No complaint 

of chest pain, no hemoptysis, no nausea vomiting or diarrhea.





On 12/15/2021 patient seen in follow-up on medical surgical floor.  Patient is 

laying on his left side, he remains on interval at 60 L and FiO2 of 90% and 

nonrebreather mask and his pulse ox is 86-93%.  Breathing fairly comfortably, 

although he has been quite limited in terms of activity tolerance.  Afebrile, 

hemodynamically has been stable, he continues on Decadron 6 mg twice daily, he 

continues on Zosyn for empiric antibiotic coverage is on prophylactic Lovenox 40

mg twice daily.  Sputum culture showed Candida albicans, blood cultures have 

been negative.  His pro calcitonin level was improving on previous labs.  No 

acute events overnight, his lower extremity Dopplers were negative for DVT.  His

d-dimer on yesterday's labs was 1.53.  His electrolytes and renal profile were 

unremarkable.








Objective





- Vital Signs


Vital signs: 


                                   Vital Signs











Temp  98.2 F   12/15/21 13:28


 


Pulse  87   12/15/21 13:28


 


Resp  18   12/15/21 13:28


 


BP  113/74   12/15/21 13:28


 


Pulse Ox  86 L  12/15/21 13:28








                                 Intake & Output











 12/14/21 12/15/21 12/15/21





 18:59 06:59 18:59


 


Intake Total 100  


 


Output Total  800 


 


Balance 100 -800 


 


Weight   111.13 kg


 


Intake:   


 


  Intake, IV Titration 100  





  Amount   


 


    Piperacillin-Tazobactam 3 100  





    .375 gm In Sodium   





    Chloride 0.9% 100 ml @ 25   





    mls/hr IVPB Q8H [facility] Rx#:   





    [id number]   


 


Output:   


 


  Urine  800 


 


Other:   


 


  Voiding Method Urinal Urinal 


 


  # Voids  1 














- Exam


 GENERAL EXAM: Alert, very pleasant, 56-year-old white male, on 60 L and FiO2 of

90%


HEAD: Normocephalic/atraumatic.


EYES: Normal reaction of pupils, equal size.  Conjunctiva pink, sclera white.


NOSE: Clear with pink turbinates.


THROAT: No erythema or exudates.


NECK: No masses, no JVD, no thyroid enlargement, no adenopathy.


CHEST: No chest wall deformity.  Symmetrical expansion. 


LUNGS: Equal air entry with diffuse crackles, and rhonchi, no wheezing


CVS: Regular rate and rhythm, normal S1 and S2, no gallops, no murmurs, no rubs


ABDOMEN: Soft, nontender.  No hepatosplenomegaly, normal bowel sounds, no 

guarding or rigidity.


EXTREMITIES: No clubbing, no edema, no cyanosis, 2+ pulses and upper and lower 

extremities.


MUSCULOSKELETAL: Muscle strength and tone normal.


SPINE: No scoliosis or deformity


SKIN: No rashes


CENTRAL NERVOUS SYSTEM: Alert and oriented -3.  No focal deficits, tone is 

normal in all 4 extremities.


PSYCHIATRIC: Alert and oriented -3.  Appropriate affect.  Intact judgment and 

insight.











- Labs


CBC & Chem 7: 


                                 12/12/21 06:17





                                 12/14/21 05:35


Labs: 


                  Abnormal Lab Results - Last 24 Hours (Table)











  12/14/21 12/15/21 Range/Units





  16:42 06:37 


 


POC Glucose (mg/dL)  116 H  102 H  (75-99)  mg/dL








                      Microbiology - Last 24 Hours (Table)











 12/12/21 12:26 Blood Culture - Preliminary





 Blood    No Growth after 72 hours


 


 12/12/21 12:29 Blood Culture - Preliminary





 Blood    No Growth after 72 hours














Assessment and Plan


Plan: 


 Assessment:





#1.  Acute hypoxic respiratory failure secondary to acute COVID-19 pneumonia.  

Patient is status post monoclonal antibody infusion on 12/07/2021.  Patient was 

not a candidate for Remdesivir related to severe hypoxic respiratory failure on 

presentation requiring 10 L high flow nasal cannula.  Today he is on 15 L and 

100% nonrebreather mask, his FiO2 requirements have increased but patient is not

a candidate for Baricitinib related to possibility of underlying bacterial 

infection





#2.  Elevated pro-calcitonin, rule out possibility of bacterial superinfection, 

patient has been empirically started on Zosyn and vancomycin





#3.  Elevated inflammatory markers related to acute COVID-19 pneumonia and 

possibly a bacterial pneumonia





#4.  Elevated LFTs related to COVID-19 pneumonia, slightly worsened





#5.  Elevated d-dimer, venous Doppler of bilateral lower extremities was 

negative for DVT





#6.  History of fibromyalgia





#7.  Depression





#8.  Never smoker





#9.  Acute kidney injury possibly related to Covid 19 related ATN, 

Zoloftresolved





#10.  Hypothyroidism





Plan:





Continue current medical treatment


Continue empiric antibiotics


Continue current dose Decadron and Lovenox


Obtain CT angiogram of the chest to rule out possibility of pulmonary embolism


We'll continue to follow his clinical course





I performed a history & physical examination of the patient and discussed their 

management with my nurse practitioner, Lissy Brody.  I reviewed the nurse 

practitioner's note and agree with the documented findings and plan of care.  

Lung sounds are positive for dim breath sounds throughout the lung fields.  The 

findings and the impression was discussed with the patient.  I attest to the 

documentation by the nurse practitioner. 








Time with Patient: Less than 30

## 2021-12-15 NOTE — P.PN
Subjective


Progress Note Date: 12/15/21





History of present illness


56-year-old  male with past medical history of fibromyalgia comes in to

emergency room with symptoms of nausea, vomiting, dizziness and sweating feeling

weak in with runny nose loss of taste and hence now for the past 1 week.  He 

presented on 12/7 was found to have positive COVID results.  Received his 

monoclonal antibodies.  He Came back to the emergency room because of worsening 

of symptoms.Vitals reviewed patient afebrile pulse 80 respiratory rate 20 blood 

pressure 138/82 saturating at 88% on 10 L labs reviewed WBC 6.6 hemoglobin 13.9 

d-dimer is elevated at 0.89, sodium 135 bicarb 21 BUN 16 creatinine 1.3 glucose 

187 ferritin 2822 AST 89 ALT 80 alkaline phosphatase 225 LDH 1091 and CRP 21 and

albumin 3.3


X-ray suggestive of bilateral patchy pneumonia.  Both interstitial and airspace 

infiltrate


Pulmonary clinic consulted for COVID pneumonia


Patient placed on Lovenox 40 subcu twice a day, vitamin C, D and zinc.  

Dexamethasone initiated at 6 mg twice a day IV


Patient is a candidate of Baritinib and will discuss about initiating it with 

pulmonary


Venous Doppler ordered





12/10: [facility].  He continues to have 

dyspnea and is on 15 L high flow nasal cannula and nonrebreather with pulse ox 

of 87 and 95%.  He's been afebrile, heart rate in the 60s and 70s, blood 

pressure 127/73.  Pro-calcitonin came back high at 9.75 and we started the 

patient on Zosyn and vancomycin for bacterial pneumonia coverage.  Patient has 

coarse crackles bilaterally.  No lower extremity edema.  He has been seen and 

followed by pulmonary medicine.  Patient is not a candidate for Baricitinib.  

Patient is continued on Decadron, Lovenox and vitamin supplements.





12/11: Is found sitting up in the edge of the bed.  He is currently not on a 

Ventimask.  However his pulse oxing still around 88 to 91 % on Ventimask or 15 L

high flow nasal cannula. Patient continues to have dyspnea with activity and 

speaking. Patient states that he is feeling much better compared to yesterday.  

Continues to have a cough.  No lower extremity edema.  He has rhonchi to the 

bases bilaterally.  He is currently on Decadron Lovenox and vitamin supplements.

 Patient remains afebrile, heart rate 62, respirations 17, blood pressure 118/68

pulse ox 93% on 15 L high flow nasal cannula





12/12: Patient was up to the bathroom showering today.  Awaiting inflammation 

markers today.  Patient still requires 10 L of O2 with a pulse ox of 90%.  

Patient continues to have crackles to the bilateral bases.  Dyspnea with 

activity and speaking.  Patient states he is feeling better however he is very 

tired.  Continues to have cough no lower extremity edema.  He still currently on

Decadron, Lovenox, and vitamin supplements.  Patient remains afebrile, heart 

rate 58, respirations 17, blood pressure 118/89,





12/13: Patient is still on high flow nasal cannula 15 L and nonrebreather with 

pulse ox of 89 and 99%.  He's been afebrile, heart rate 73, blood pressure 

120/72.  Creatinine 1.06.  Blood sugars running between 101 and 106.  Sputum 

culture is in progress.  Patient is followed closely by pulmonary medicine.  

Patient is continued on dexamethasone, Lovenox, Zosyn and vitamin supplements.  

Patient has been encouraged to prone several hours daily.





12/14: Patient remains on nonrebreather mask and AirVo at 60 L, FiO2 of 85 with 

pulse ox of 88%.  Appears to have more difficulty with breathing.  Repeat chest 

x-ray reveals stable bilateral infiltrates.  He has been afebrile, heart rate 

59, blood pressure 102/59.  Repeat d-dimer 1.53.  Blood sugars are well 

controlled.





12/15: Patient remains on nonrebreather and AirVo with pulse ox at 90%.  He has 

been afebrile, heart rate 70, blood pressure 131/79.  CBG running between 97 and

116 and CBGs and insulin will be discontinued.  Sputum cultures positive for 

Candida albicans.  Patient is increasing his activity, currently laying in bed 

on his side.  Lab work including inflammatory markers ordered for tomorrow.





ROS


Constitutional: Denies chills, denies fever, reports malaise, denies nausea and 

vomiting, reports generalized fatigue


Eyes: denies decreased vision, denies diplopia, denies discharge, denies pain


Ears: deny: decreased hearing


Ears, nose, mouth and throat: Denies dental pain, Denies headache, Denies nasal 

discharge, Denies nose pain


Cardiovascular: Denies chest pain, Denies decreased exercise tolerance, Denies 

edema, Denies high blood pressure, Denies irregular heart beat, Denies 

palpitations, Denies paroxysmal nocturnal dyspnea, Denies rapid heart beat, 

Denies shortness of breath


Respiratory: Denies congestion, reports cough, Denies cough with sputum, reports

dyspnea, Denies home oxygen, Denies wheezing, dyspnea with minimal exertion


Gastrointestinal: Denies abdominal pain, Denies coffee ground emesis, Denies 

early satiety, Denies excessive gas, Denies heartburn, Denies hematemesis, 

Denies hematochezia, Denies loss of appetite, endorses nausea and vomiting


Genitourinary: Denies dysuria, Denies flank pain, Denies kidney stones, Denies 

menorrhagia, Denies urgency, Denies urinary frequency


Musculoskeletal: Denies gait dysfunction, Denies limitation of motion, Denies 

morning stiffness, Denies muscle cramps


Integumentary: Denies rash, Denies wounds, Denies brittle nails, Denies change 

in hair/nails, Denies darkening of skin


Neurological: Denies balance difficulties, Denies change in speech, Denies 

double vision, Denies gait dysfunction, Denies loss of vision, Denies motor 

disturbance, Denies numbness, Denies paralysis, Denies paresthesias, Denies 

seizures


Psychiatric: Denies anxiety, Denies depression


Endocrine: Denies excessive sweating, Denies excessive thirst, Denies high blood

sugars, Denies palpitations


Hematologic/Lymphatic: Denies easy bruising, Denies lymphadenopathy





Physical exam


General Appearance: Alert, cooperative, mild distress, appears stated age.  

Patient is resting in bed.


Neck HEENT: Supple, no lymphadenopathy, no thyroid enlargement, no carotid 

bruits.


Lungs: Bilateral crackles to the bases, no deformity.


Chest Wall: Chest wall normal expansion with deep inspiration no tenderness and 

no deformity was found on exam, no costochondral pain or discomfort.


Heart: Regular rate and rhythm, S1, S2 normal, no murmur, rub or gallop.


Back: Symmetric, no curvature, ROM normal, no CVA tenderness.


Abdomen: Soft, non-tender, no rebound or rigidity, no hepatosplenomegaly.


Extremities: Extremities normal, atraumatic, no cyanosis or edema.


Pulses: 2+ and symmetric.


Skin: Skin color, texture, tugor normal, no rashes or lesions.


Neurologic: Alert oriented x3 cranial nerves II through XII intact, no motor 

deficit, no abnormal balance or gait





Assessment and plan


1. Acute hypoxic respiratory failure secondary to Covid 19 pneumonia.  Consult 

with pulmonary medicine appreciated.  Oxygen therapy, currently on nonrebreather

mask and AirVo at 60 L, FiO2 of 90 with pulse ox of 86 and 93 %.





2. Acute COVID pneumonia. Dexamethasone 6 mg IV twice a day,  Lovenox 40 subcu 

twice a day, Not a candidate for Baricitinib, Monitor inflammatory markers. 

Continue supplements vitamin C, D and zinc





3. Acute transaminitis Secondary to viral inflammation. Continue CMP monitoring





4. CK D3.  Continue to monitor patient's creatinine.  No acute kidney injury





5. History of fibromyalgia. continue gabapentin 800 3 times a day, Citalopram 40

mg by mouth daily, Tramadol 100 mg every 6 hours as needed





6. Insomnia. Continue Eszopiclone 3 mg daily at bedtime 





7. Hypothyroidism. Levothyroxine 50 g by mouth daily





8. DVT prophylaxis. Lovenox 40 subcu twice a day





9. GI prophylaxis. Pepcid 20 mg by mouth daily





Discharge Plan: 


more than likely home





Impression and plan of care have been directed as dictated by the signing 

physician.  Babita Hawkins nurse practitioner acting as scribe for signing 

physician.








Objective





- Vital Signs


Vital signs: 


                                   Vital Signs











Temp  98.3 F   12/15/21 06:00


 


Pulse  47 L  12/15/21 06:00


 


Resp  20   12/15/21 06:00


 


BP  104/59   12/15/21 06:00


 


Pulse Ox  90 L  12/15/21 06:00








                                 Intake & Output











 12/14/21 12/15/21 12/15/21





 18:59 06:59 18:59


 


Intake Total 100  


 


Output Total  800 


 


Balance 100 -800 


 


Intake:   


 


  Intake, IV Titration 100  





  Amount   


 


    Piperacillin-Tazobactam 3 100  





    .375 gm In Sodium   





    Chloride 0.9% 100 ml @ 25   





    mls/hr IVPB Q8H [facility] Rx#:   





    [id number]   


 


Output:   


 


  Urine  800 


 


Other:   


 


  Voiding Method Urinal Urinal 


 


  # Voids  1 














- Labs


CBC & Chem 7: 


                                 12/12/21 06:17





                                 12/14/21 05:35


Labs: 


                  Abnormal Lab Results - Last 24 Hours (Table)











  12/14/21 12/14/21 12/14/21 Range/Units





  05:35 12:02 16:42 


 


Carbon Dioxide  18.7 L    (20.0-27.5)  mmol/L


 


BUN/Creatinine Ratio  25.81 H    (12.00-20.00)  Ratio


 


Glucose  124 H    ()  mg/dL


 


POC Glucose (mg/dL)   112 H  116 H  (75-99)  mg/dL


 


Calcium  8.6 L    (8.7-10.3)  mg/dL


 


AST  84 H    (14-35)  U/L


 


ALT  199 H    (10-49)  U/L


 


Alkaline Phosphatase  130 H    ()  U/L


 


Lactate Dehydrogenase  401 H    (120-246)  U/L


 


C-Reactive Protein  7.00 H    (0.00-0.80)  mg/dL


 


Total Protein  5.8 L    (6.2-8.2)  g/dL


 


Albumin  3.1 L    (3.8-4.9)  g/dL


 


Albumin/Globulin Ratio  1.12 L    (1.60-3.17)  g/dL














  12/15/21 Range/Units





  06:37 


 


Carbon Dioxide   (20.0-27.5)  mmol/L


 


BUN/Creatinine Ratio   (12.00-20.00)  Ratio


 


Glucose   ()  mg/dL


 


POC Glucose (mg/dL)  102 H  (75-99)  mg/dL


 


Calcium   (8.7-10.3)  mg/dL


 


AST   (14-35)  U/L


 


ALT   (10-49)  U/L


 


Alkaline Phosphatase   ()  U/L


 


Lactate Dehydrogenase   (120-246)  U/L


 


C-Reactive Protein   (0.00-0.80)  mg/dL


 


Total Protein   (6.2-8.2)  g/dL


 


Albumin   (3.8-4.9)  g/dL


 


Albumin/Globulin Ratio   (1.60-3.17)  g/dL








                      Microbiology - Last 24 Hours (Table)











 12/12/21 12:26 Blood Culture - Preliminary





 Blood    No Growth after 48 hours


 


 12/12/21 12:29 Blood Culture - Preliminary





 Blood    No Growth after 48 hours


 


 12/12/21 14:00 Gram Stain - Final





 Sputum Sputum Culture - Final





    Candida albicans

## 2021-12-16 LAB
ALBUMIN SERPL-MCNC: 3 G/DL (ref 3.8–4.9)
ALBUMIN/GLOB SERPL: 1.03 G/DL (ref 1.6–3.17)
ALP SERPL-CCNC: 123 U/L (ref 41–126)
ALT SERPL-CCNC: 235 U/L (ref 10–49)
ANION GAP SERPL CALC-SCNC: 10.1 MMOL/L (ref 10–18)
ARTERIAL PATENCY WRIST A: NO
AST SERPL-CCNC: 76 U/L (ref 14–35)
BASOPHILS # BLD AUTO: 0.01 X 10*3/UL (ref 0–0.1)
BASOPHILS NFR BLD AUTO: 0.1 %
BUN SERPL-SCNC: 26.9 MG/DL (ref 9–27)
BUN/CREAT SERPL: 29.89 RATIO (ref 12–20)
CALCIUM SPEC-MCNC: 8.6 MG/DL (ref 8.7–10.3)
CHLORIDE SERPL-SCNC: 104 MMOL/L (ref 96–109)
CO2 BLDA-SCNC: 25 MMOL/L (ref 19–24)
CO2 BLDA-SCNC: 26 MMOL/L (ref 19–24)
CO2 SERPL-SCNC: 19.9 MMOL/L (ref 20–27.5)
EOSINOPHIL # BLD AUTO: 0 X 10*3/UL (ref 0.04–0.35)
EOSINOPHIL NFR BLD AUTO: 0 %
ERYTHROCYTE [DISTWIDTH] IN BLOOD BY AUTOMATED COUNT: 4.26 X 10*6/UL (ref 4.4–5.6)
ERYTHROCYTE [DISTWIDTH] IN BLOOD: 13.4 % (ref 11.5–14.5)
GLOBULIN SER CALC-MCNC: 2.9 G/DL (ref 1.6–3.3)
GLUCOSE BLD-MCNC: 122 MG/DL (ref 75–99)
GLUCOSE BLD-MCNC: 137 MG/DL (ref 75–99)
GLUCOSE BLD-MCNC: 150 MG/DL (ref 75–99)
GLUCOSE BLD-MCNC: 94 MG/DL (ref 75–99)
GLUCOSE SERPL-MCNC: 104 MG/DL (ref 70–110)
HCO3 BLDA-SCNC: 24 MMOL/L (ref 21–25)
HCO3 BLDA-SCNC: 24 MMOL/L (ref 21–25)
HCT VFR BLD AUTO: 38 % (ref 39.6–50)
HGB BLD-MCNC: 12.7 G/DL (ref 13–17)
LDH SPEC-CCNC: 349 U/L (ref 120–246)
LYMPHOCYTES # SPEC AUTO: 0.42 X 10*3/UL (ref 0.9–5)
LYMPHOCYTES NFR SPEC AUTO: 4.4 %
MCH RBC QN AUTO: 29.8 PG (ref 27–32)
MCHC RBC AUTO-ENTMCNC: 33.4 G/DL (ref 32–37)
MCV RBC AUTO: 89.2 FL (ref 80–97)
MONOCYTES # BLD AUTO: 0.26 X 10*3/UL (ref 0.2–1)
MONOCYTES NFR BLD AUTO: 2.7 %
NEUTROPHILS # BLD AUTO: 8.64 X 10*3/UL (ref 1.8–7.7)
NEUTROPHILS NFR BLD AUTO: 91.4 %
PCO2 BLDA: 34 MMHG (ref 35–45)
PCO2 BLDA: 64 MMHG (ref 35–45)
PH BLDA: 7.18 [PH] (ref 7.35–7.45)
PH BLDA: 7.45 [PH] (ref 7.35–7.45)
PLATELET # BLD AUTO: 363 X 10*3/UL (ref 140–440)
PO2 BLDA: 49 MMHG (ref 83–108)
PO2 BLDA: 79 MMHG (ref 83–108)
POTASSIUM SERPL-SCNC: 4.8 MMOL/L (ref 3.5–5.5)
PROT SERPL-MCNC: 5.9 G/DL (ref 6.2–8.2)
SODIUM SERPL-SCNC: 134 MMOL/L (ref 135–145)
WBC # BLD AUTO: 9.46 X 10*3/UL (ref 4.5–10)

## 2021-12-16 PROCEDURE — 03HY32Z INSERTION OF MONITORING DEVICE INTO UPPER ARTERY, PERCUTANEOUS APPROACH: ICD-10-PCS

## 2021-12-16 PROCEDURE — 5A1955Z RESPIRATORY VENTILATION, GREATER THAN 96 CONSECUTIVE HOURS: ICD-10-PCS

## 2021-12-16 PROCEDURE — 0D9670Z DRAINAGE OF STOMACH WITH DRAINAGE DEVICE, VIA NATURAL OR ARTIFICIAL OPENING: ICD-10-PCS

## 2021-12-16 PROCEDURE — 4A133J1 MONITORING OF ARTERIAL PULSE, PERIPHERAL, PERCUTANEOUS APPROACH: ICD-10-PCS

## 2021-12-16 PROCEDURE — 5A09357 ASSISTANCE WITH RESPIRATORY VENTILATION, LESS THAN 24 CONSECUTIVE HOURS, CONTINUOUS POSITIVE AIRWAY PRESSURE: ICD-10-PCS

## 2021-12-16 PROCEDURE — 3E0G76Z INTRODUCTION OF NUTRITIONAL SUBSTANCE INTO UPPER GI, VIA NATURAL OR ARTIFICIAL OPENING: ICD-10-PCS

## 2021-12-16 PROCEDURE — 02HV33Z INSERTION OF INFUSION DEVICE INTO SUPERIOR VENA CAVA, PERCUTANEOUS APPROACH: ICD-10-PCS

## 2021-12-16 PROCEDURE — 0BH17EZ INSERTION OF ENDOTRACHEAL AIRWAY INTO TRACHEA, VIA NATURAL OR ARTIFICIAL OPENING: ICD-10-PCS

## 2021-12-16 PROCEDURE — 4A133B1 MONITORING OF ARTERIAL PRESSURE, PERIPHERAL, PERCUTANEOUS APPROACH: ICD-10-PCS

## 2021-12-16 RX ADMIN — ALBUTEROL SULFATE SCH: 90 AEROSOL, METERED RESPIRATORY (INHALATION) at 15:35

## 2021-12-16 RX ADMIN — FAMOTIDINE SCH MG: 20 TABLET, FILM COATED ORAL at 08:54

## 2021-12-16 RX ADMIN — DEXTROSE SCH MG: 50 INJECTION, SOLUTION INTRAVENOUS at 21:19

## 2021-12-16 RX ADMIN — DEXTROSE SCH MG: 50 INJECTION, SOLUTION INTRAVENOUS at 08:49

## 2021-12-16 RX ADMIN — SODIUM CHLORIDE SCH MLS/HR: 900 INJECTION, SOLUTION INTRAVENOUS at 12:12

## 2021-12-16 RX ADMIN — CEFAZOLIN SCH MLS/HR: 330 INJECTION, POWDER, FOR SOLUTION INTRAMUSCULAR; INTRAVENOUS at 17:17

## 2021-12-16 RX ADMIN — ALBUTEROL SULFATE SCH PUFF: 90 AEROSOL, METERED RESPIRATORY (INHALATION) at 20:20

## 2021-12-16 RX ADMIN — ALBUTEROL SULFATE SCH PUFF: 90 AEROSOL, METERED RESPIRATORY (INHALATION) at 07:28

## 2021-12-16 RX ADMIN — INSULIN ASPART SCH UNIT: 100 INJECTION, SOLUTION INTRAVENOUS; SUBCUTANEOUS at 23:45

## 2021-12-16 RX ADMIN — LEVOTHYROXINE SODIUM SCH MCG: 50 TABLET ORAL at 05:31

## 2021-12-16 RX ADMIN — GABAPENTIN SCH MG: 400 CAPSULE ORAL at 21:20

## 2021-12-16 RX ADMIN — CISATRACURIUM BESYLATE SCH MLS/HR: 10 INJECTION INTRAVENOUS at 12:11

## 2021-12-16 RX ADMIN — SODIUM CHLORIDE SCH MLS/HR: 900 INJECTION, SOLUTION INTRAVENOUS at 21:54

## 2021-12-16 RX ADMIN — INSULIN ASPART SCH: 100 INJECTION, SOLUTION INTRAVENOUS; SUBCUTANEOUS at 14:04

## 2021-12-16 RX ADMIN — ENOXAPARIN SODIUM SCH MG: 40 INJECTION SUBCUTANEOUS at 08:48

## 2021-12-16 RX ADMIN — GABAPENTIN SCH: 400 CAPSULE ORAL at 16:55

## 2021-12-16 RX ADMIN — Medication SCH MG: at 08:54

## 2021-12-16 RX ADMIN — CITALOPRAM HYDROBROMIDE SCH MG: 20 TABLET ORAL at 08:55

## 2021-12-16 RX ADMIN — GABAPENTIN SCH MG: 400 CAPSULE ORAL at 08:55

## 2021-12-16 RX ADMIN — OXYCODONE HYDROCHLORIDE AND ACETAMINOPHEN SCH MG: 500 TABLET ORAL at 08:54

## 2021-12-16 RX ADMIN — CISATRACURIUM BESYLATE SCH MLS/HR: 10 INJECTION INTRAVENOUS at 23:18

## 2021-12-16 RX ADMIN — CHLORHEXIDINE GLUCONATE SCH ML: 1.2 RINSE ORAL at 21:19

## 2021-12-16 RX ADMIN — INSULIN ASPART SCH: 100 INJECTION, SOLUTION INTRAVENOUS; SUBCUTANEOUS at 18:10

## 2021-12-16 RX ADMIN — Medication SCH MCG: at 08:54

## 2021-12-16 RX ADMIN — PIPERACILLIN AND TAZOBACTAM SCH MLS/HR: 3; .375 INJECTION, POWDER, FOR SOLUTION INTRAVENOUS at 05:31

## 2021-12-16 NOTE — P.PN
Subjective


Progress Note Date: 12/16/21


Principal diagnosis: 


 Dyspnea, hypoxia, COVID-19





This is a very pleasant 56-year-old male patient with a known history of 

fibromyalgia.  Nonsmoker.  Approximate 1 week ago he developed symptoms of 

nausea vomiting diarrhea dizziness and sweating.  He presented here on 

12/07/2021 and has a positive for COVID-19 and received monoclonal antibodies.  

He came back to the emergency room last evening with worsening symptoms.  Chest 

x-ray reveals bilateral patchy interstitial and airspace infiltrates.  He is 

currently requiring 10 L high flow nasal cannula to maintain O2 saturations in 

the high 80s low 90s.  White count 6.6.  Hemoglobin 13.9.  Lymphocytes 0.3.  

Sodium 135.  Potassium 4.1.  Bicarb 21.  Creatinine 1.31.  Glucose 187.  

Ferritin 2822.  AST 89.  ALT 80.  LDH 1091.  C-reactive protein 21.0.  Initiated

on Decadron. 





On 12/10/2021 patient seen in follow-up on medical surgical floor, he is awake 

and alert, in no acute distress, overnight he wore 15 L high flow and n

onrebreather mask, he is currently off the nonrebreather mask and not he is on 

15 L high flow nasal cannula and he is maintaining O2 saturations at 93%, 

afebrile, hemodynamically stable.  However his oxygen requirements have 

significantly increased in last 24 hours.  His pro calcitonin level was sig

nificantly elevated at 9.75, and patient is not a candidate for Baricitinib in 

view of possibility of underlying bacterial infection.  Patient states she is 

coughing and bringing up some greenish colored phlegm at times, he is being 

started on Zosyn and vancomycin empirically, we will order blood cultures, 

sputum culture and urine cultures.  Clinically he looks fairly comfortable, no 

distress, lung sounds are revealing coarse diffuse rhonchi and rales.  No 

altered mentation, no fever.  He continues on Lovenox 40 mg twice daily, and 

Decadron 6 mg twice daily.





On 12/12/2021 patient seen in follow-up on medical surgical floor, is currently 

on 15 L of oxygen, he is not using the nonrebreather mask, his pulse ox is 90-

95%, patient has been self repositioning in bed, he states his breathing is 

about the same, maybe slightly improved, lung sounds are positive for fine rales

bilaterally, he remains on Zosyn for empiric antibiotic coverage, Decadron 6 

mggram twice daily, vancomycin has been discontinued.  Patient is afebrile, 

hemodynamically has been stable.  Tolerating oral intake, his labs have been 

reviewed today, his white blood cell count is 7.42, hemoglobin is 13.2, d-dimer 

is 1.4, electrolytes and renal profile are within normal limits, his liver 

enzymes have worsened on today's labs, and AST is up to 108, ALT is 182, 

alkaline phosphatase is actually improved and is down to 150.  His LDH is 

improved and is down to 413, and CRP is 4.1, improved compared to admission 

levels.  Pro calcitonin level is improving and is down to 1.34, urinalysis did 

not show evidence of infection.





On 12/13/2021 patient seen in follow-up on medical surgical floor, he remains on

high flow oxygen at 15 L, and 100% nonrebreather mask, his been pretty 

successful with self repositioning in bed, his pulse ox is around 96% on both 

oxygen delivery devices, at times it is near 99%, patient is mildly short of 

breath with any exertion, she does desaturate with any exertion, into the low 

80s recovers.  Other vitals have been stable, his been afebrile, has cough and 

congestion have improved, history and culture was sent, showing few PMNs and a 

few gram-positive cocci.  Patient remains on empiric antibiotic coverage with 

Zosyn only, vancomycin has been discontinued, blood cultures have shown no kodi

wth.  Follow-up pro-calcitonin will be obtained for tomorrow, physical exam 

reveals a few scattered rhonchi, improved from yesterday's exam.  No complaint 

of chest pain, no hemoptysis, no nausea vomiting or diarrhea.





On 12/15/2021 patient seen in follow-up on medical surgical floor.  Patient is 

laying on his left side, he remains on interval at 60 L and FiO2 of 90% and 

nonrebreather mask and his pulse ox is 86-93%.  Breathing fairly comfortably, 

although he has been quite limited in terms of activity tolerance.  Afebrile, 

hemodynamically has been stable, he continues on Decadron 6 mg twice daily, he 

continues on Zosyn for empiric antibiotic coverage is on prophylactic Lovenox 40

mg twice daily.  Sputum culture showed Candida albicans, blood cultures have 

been negative.  His pro calcitonin level was improving on previous labs.  No 

acute events overnight, his lower extremity Dopplers were negative for DVT.  His

d-dimer on yesterday's labs was 1.53.  His electrolytes and renal profile were 

unremarkable.





On 12/16/2021 patient seen in follow-up on medical surgical floor, rapid 

response team was called on him this morning, patient was placed on BiPAP 

support at 14 and 8 and FiO2 100%, patient's pulse ox has remained around 83%.  

This morning he was adamant about not being intubated, patient is a COVID-19 

pneumonia patient, she remains on Decadron 6 mg twice daily, Lovenox 40 mg twice

daily, he's been treated with empiric antibiotics for possibility of bacterial 

pneumonia, his pro calcitonin today is improved and is down to 0.17.  His 

inflammatory markers are improving, his LDH is down to 349, and CRP is 4.8.  

However despite that his oxygenation has worsened.  Yesterday CT angiogram was 

ordered however patient could not lie down flat, and the test was not completed.

 He is awake and alert, upon my arrival to the bedside, he is speaking to his 

son on the phone, he has changed his mind about his CODE STATUS, and he is 

agreeable to intubation if need be.  He is dyspneic, slightly tachypneic but 

does not appear to be in distress.  He remains on BiPAP support, and his O2 s

aturation has been around 82%, blood gas was completed showing pO2 of only 49, 

pCO2 of 34, and pH of 7.45.  Patient was urgently transferred to the intensive 

care unit and intubated on arrival.  He was placed on mechanical ventilator 

support with assist control and a rate of 28, tidal volume was 450, FiO2 100% 

and PEEP of 16.  His d-dimer today is 1.31.  His white count is 9.4, hemoglobin 

is 12.7.  His blood cultures from a few days ago have shown no growth, and his 

sputum culture from 12/12/2021 only showed Candida species.  Patient's son was 

notified about patient's change in condition, and that he has been moved to the 

ICU and intubated.  His condition is critical.  Follow-up chest x-ray after 

intubation and left subclavian central venous catheter placement showed 

increased bilateral multifocal opacities, no pneumothorax, and proper 

positioning of the left subclavian central venous catheter. 








Objective





- Vital Signs


Vital signs: 


                                   Vital Signs











Temp  98.4 F   12/16/21 09:45


 


Pulse  69   12/16/21 09:45


 


Resp  28 H  12/16/21 07:40


 


BP  128/83   12/16/21 09:45


 


Pulse Ox  84 L  12/16/21 09:51








                                 Intake & Output











 12/15/21 12/16/21 12/16/21





 18:59 06:59 18:59


 


Intake Total 360  


 


Balance 360  


 


Weight 111.13 kg  111.13 kg


 


Intake:   


 


  Oral 360  


 


Other:   


 


  Voiding Method  Urinal Urinal


 


  # Voids 2 2 














- Exam


 GENERAL EXAM: Intubated, sedated and paralyzed 56-year-old white male, on 

assist-control mode of ventilation with a rate of 28, tidal volume 450, FiO2 100

% and PEEP of 16


HEAD: Normocephalic/atraumatic.


EYES: Normal reaction of pupils, equal size.  Conjunctiva pink, sclera white.


NOSE: Clear with pink turbinates.


THROAT: No erythema or exudates.


NECK: No masses, no JVD, no thyroid enlargement, no adenopathy.


CHEST: No chest wall deformity.  Symmetrical expansion. Left subclavian central 

line in place


LUNGS: Equal air entry with diffuse crackles, and rhonchi, no wheezing


CVS: Regular rate and rhythm, normal S1 and S2, no gallops, no murmurs, no rubs


ABDOMEN: Soft, nontender.  No hepatosplenomegaly, normal bowel sounds, no 

guarding or rigidity.


EXTREMITIES: No clubbing, no edema, no cyanosis, 2+ pulses and upper and lower 

extremities.


MUSCULOSKELETAL: Muscle strength and tone normal.


SPINE: No scoliosis or deformity


SKIN: No rashes


CENTRAL NERVOUS SYSTEM: Sedated, paralyzed, and intubated No focal deficits, 

tone is normal in all 4 extremities.











- Labs


CBC & Chem 7: 


                                 12/16/21 05:24





                                 12/16/21 05:24


Labs: 


                  Abnormal Lab Results - Last 24 Hours (Table)











  12/16/21 12/16/21 12/16/21 Range/Units





  05:24 05:24 05:24 


 


RBC     (4.40-5.60)  X 10*6/uL


 


Hgb     (13.0-17.0)  g/dL


 


Hct     (39.6-50.0)  %


 


Immature Gran #     (0.00-0.04)  X 10*3/uL


 


Neutrophils #     (1.80-7.70)  X 10*3/uL


 


Lymphocytes #     (0.90-5.00)  X 10*3/uL


 


Eosinophils #     (0.04-0.35)  X 10*3/uL


 


D-Dimer   1.31 H   (<0.60)  mg/L FEU


 


ABG pH     (7.35-7.45)  


 


ABG pCO2     (35-45)  mmHg


 


ABG pO2     ()  mmHg


 


ABG Total CO2     (19-24)  mmol/L


 


ABG O2 Saturation     (94-97)  %


 


Sodium    134 L  (135-145)  mmol/L


 


Carbon Dioxide    19.9 L  (20.0-27.5)  mmol/L


 


BUN/Creatinine Ratio    29.89 H  (12.00-20.00)  Ratio


 


POC Glucose (mg/dL)     (75-99)  mg/dL


 


Calcium    8.6 L  (8.7-10.3)  mg/dL


 


AST    76 H  (14-35)  U/L


 


ALT    235 H  (10-49)  U/L


 


Lactate Dehydrogenase    349 H  (120-246)  U/L


 


C-Reactive Protein    4.80 H  (0.00-0.80)  mg/dL


 


Total Protein    5.9 L  (6.2-8.2)  g/dL


 


Albumin    3.0 L  (3.8-4.9)  g/dL


 


Albumin/Globulin Ratio    1.03 L  (1.60-3.17)  g/dL


 


Procalcitonin  0.17 H    (0.02-0.09)  ng/mL














  12/16/21 12/16/21 12/16/21 Range/Units





  05:24 10:20 13:22 


 


RBC  4.26 L    (4.40-5.60)  X 10*6/uL


 


Hgb  12.7 L    (13.0-17.0)  g/dL


 


Hct  38.0 L    (39.6-50.0)  %


 


Immature Gran #  0.13 H    (0.00-0.04)  X 10*3/uL


 


Neutrophils #  8.64 H    (1.80-7.70)  X 10*3/uL


 


Lymphocytes #  0.42 L    (0.90-5.00)  X 10*3/uL


 


Eosinophils #  0 L    (0.04-0.35)  X 10*3/uL


 


D-Dimer     (<0.60)  mg/L FEU


 


ABG pH     (7.35-7.45)  


 


ABG pCO2   34 L   (35-45)  mmHg


 


ABG pO2   49 L*   ()  mmHg


 


ABG Total CO2   25 H   (19-24)  mmol/L


 


ABG O2 Saturation   84.7 L   (94-97)  %


 


Sodium     (135-145)  mmol/L


 


Carbon Dioxide     (20.0-27.5)  mmol/L


 


BUN/Creatinine Ratio     (12.00-20.00)  Ratio


 


POC Glucose (mg/dL)    122 H  (75-99)  mg/dL


 


Calcium     (8.7-10.3)  mg/dL


 


AST     (14-35)  U/L


 


ALT     (10-49)  U/L


 


Lactate Dehydrogenase     (120-246)  U/L


 


C-Reactive Protein     (0.00-0.80)  mg/dL


 


Total Protein     (6.2-8.2)  g/dL


 


Albumin     (3.8-4.9)  g/dL


 


Albumin/Globulin Ratio     (1.60-3.17)  g/dL


 


Procalcitonin     (0.02-0.09)  ng/mL














  12/16/21 Range/Units





  13:33 


 


RBC   (4.40-5.60)  X 10*6/uL


 


Hgb   (13.0-17.0)  g/dL


 


Hct   (39.6-50.0)  %


 


Immature Gran #   (0.00-0.04)  X 10*3/uL


 


Neutrophils #   (1.80-7.70)  X 10*3/uL


 


Lymphocytes #   (0.90-5.00)  X 10*3/uL


 


Eosinophils #   (0.04-0.35)  X 10*3/uL


 


D-Dimer   (<0.60)  mg/L FEU


 


ABG pH  7.18 L*  (7.35-7.45)  


 


ABG pCO2  64 H  (35-45)  mmHg


 


ABG pO2  79 L  ()  mmHg


 


ABG Total CO2  26 H  (19-24)  mmol/L


 


ABG O2 Saturation  91.0 L  (94-97)  %


 


Sodium   (135-145)  mmol/L


 


Carbon Dioxide   (20.0-27.5)  mmol/L


 


BUN/Creatinine Ratio   (12.00-20.00)  Ratio


 


POC Glucose (mg/dL)   (75-99)  mg/dL


 


Calcium   (8.7-10.3)  mg/dL


 


AST   (14-35)  U/L


 


ALT   (10-49)  U/L


 


Lactate Dehydrogenase   (120-246)  U/L


 


C-Reactive Protein   (0.00-0.80)  mg/dL


 


Total Protein   (6.2-8.2)  g/dL


 


Albumin   (3.8-4.9)  g/dL


 


Albumin/Globulin Ratio   (1.60-3.17)  g/dL


 


Procalcitonin   (0.02-0.09)  ng/mL








                      Microbiology - Last 24 Hours (Table)











 12/12/21 12:26 Blood Culture - Preliminary





 Blood    No Growth after 96 hours


 


 12/12/21 12:29 Blood Culture - Preliminary





 Blood    No Growth after 96 hours














Assessment and Plan


Plan: 


 Assessment:





#1.  Acute hypoxic respiratory failure secondary to acute COVID-19 pneumonia.  

Patient is status post monoclonal antibody infusion on 12/07/2021.  Patient was 

not a candidate for Remdesivir related to severe hypoxic respiratory failure on 

presentation requiring 10 L high flow nasal cannula.  Today he is on 15 L and 

100% nonrebreather mask, his FiO2 requirements have increased but patient is not

a candidate for Baricitinib related to possibility of underlying bacterial 

infection.  Patient was transferred to the intensive care unit on 12/16/2021 and

intubated on 12/16/2021





#2.  Elevated pro-calcitonin, improved, blood and sputum cultures have shown no 

growth, patient has received empiric Zosyn, pro-calcitonin level has improved 

and is down to 0.17, we'll discontinue Zosyn





#3.  Elevated inflammatory markers related to acute COVID-19 pneumonia and 

possibly a bacterial pneumonia





#4.  Elevated LFTs related to COVID-19 pneumonia, slightly worsened





#5.  Elevated d-dimer, venous Doppler of bilateral lower extremities was 

negative for DVT





#6.  History of fibromyalgia





#7.  Depression





#8.  Never smoker





#9.  Acute kidney injury possibly related to Covid 19 related ATN, resolved


#10.  Hypothyroidism





Plan:





Patient was transferred to the intensive care unit today


He failed BiPAP support, remained persistently hypoxic despite 100% FiO2 on 

BiPAP support


He has decided to be intubated


He was intubated, sedated and paralyzed


Follow-up chest x-ray has been reviewed


Blood gases haven't been reviewed


Vent settings have been adjusted


Patient has received a left subclavian central venous catheter


Right radial artery line has been inserted


We'll continue same dose Decadron, can stop the Zosyn


We will add Fentanyl, propofol, Nimbex


Condition is critical


Prognosis is extremely guarded


We'll consider CT angiogram of the chest possibly in the next 24 hours he 

remains stable and able to travel to the CT department


Family has been updated


Start tube feedings for nutritional support


Daily chest x-rays, blood gases, basic labs, inflammatory markers and follow-up 

d-dimer


Place the patient in prone positioning for up to 16 hours a day if tolerates it 

and if hemodynamically stable





I performed a history & physical examination of the patient and discussed their 

management with my nurse practitioner, Lissy Brody.  I reviewed the nurse 

practitioner's note and agree with the documented findings and plan of care.  

Lung sounds are positive for dim breath sounds throughout the lung fields.  The 

findings and the impression was discussed with the patient.  I attest to the 

documentation by the nurse practitioner. 








Time with Patient: Greater than 30

## 2021-12-16 NOTE — P.PN
Subjective


Progress Note Date: 12/16/21





History of present illness


56-year-old  male with past medical history of fibromyalgia comes in to

emergency room with symptoms of nausea, vomiting, dizziness and sweating feeling

weak in with runny nose loss of taste and hence now for the past 1 week.  He 

presented on 12/7 was found to have positive COVID results.  Received his 

monoclonal antibodies.  He Came back to the emergency room because of worsening 

of symptoms.Vitals reviewed patient afebrile pulse 80 respiratory rate 20 blood 

pressure 138/82 saturating at 88% on 10 L labs reviewed WBC 6.6 hemoglobin 13.9 

d-dimer is elevated at 0.89, sodium 135 bicarb 21 BUN 16 creatinine 1.3 glucose 

187 ferritin 2822 AST 89 ALT 80 alkaline phosphatase 225 LDH 1091 and CRP 21 and

albumin 3.3


X-ray suggestive of bilateral patchy pneumonia.  Both interstitial and airspace 

infiltrate


Pulmonary clinic consulted for COVID pneumonia


Patient placed on Lovenox 40 subcu twice a day, vitamin C, D and zinc.  

Dexamethasone initiated at 6 mg twice a day IV


Patient is a candidate of Baritinib and will discuss about initiating it with 

pulmonary


Venous Doppler ordered





12/10: [facility].  He continues to have 

dyspnea and is on 15 L high flow nasal cannula and nonrebreather with pulse ox 

of 87 and 95%.  He's been afebrile, heart rate in the 60s and 70s, blood 

pressure 127/73.  Pro-calcitonin came back high at 9.75 and we started the 

patient on Zosyn and vancomycin for bacterial pneumonia coverage.  Patient has 

coarse crackles bilaterally.  No lower extremity edema.  He has been seen and 

followed by pulmonary medicine.  Patient is not a candidate for Baricitinib.  

Patient is continued on Decadron, Lovenox and vitamin supplements.





12/11: Is found sitting up in the edge of the bed.  He is currently not on a 

Ventimask.  However his pulse oxing still around 88 to 91 % on Ventimask or 15 L

high flow nasal cannula. Patient continues to have dyspnea with activity and 

speaking. Patient states that he is feeling much better compared to yesterday.  

Continues to have a cough.  No lower extremity edema.  He has rhonchi to the 

bases bilaterally.  He is currently on Decadron Lovenox and vitamin supplements.

 Patient remains afebrile, heart rate 62, respirations 17, blood pressure 118/68

pulse ox 93% on 15 L high flow nasal cannula





12/12: Patient was up to the bathroom showering today.  Awaiting inflammation 

markers today.  Patient still requires 10 L of O2 with a pulse ox of 90%.  

Patient continues to have crackles to the bilateral bases.  Dyspnea with 

activity and speaking.  Patient states he is feeling better however he is very 

tired.  Continues to have cough no lower extremity edema.  He still currently on

Decadron, Lovenox, and vitamin supplements.  Patient remains afebrile, heart 

rate 58, respirations 17, blood pressure 118/89,





12/13: Patient is still on high flow nasal cannula 15 L and nonrebreather with 

pulse ox of 89 and 99%.  He's been afebrile, heart rate 73, blood pressure 

120/72.  Creatinine 1.06.  Blood sugars running between 101 and 106.  Sputum 

culture is in progress.  Patient is followed closely by pulmonary medicine.  

Patient is continued on dexamethasone, Lovenox, Zosyn and vitamin supplements.  

Patient has been encouraged to prone several hours daily.





12/14: Patient remains on nonrebreather mask and AirVo at 60 L, FiO2 of 85 with 

pulse ox of 88%.  Appears to have more difficulty with breathing.  Repeat chest 

x-ray reveals stable bilateral infiltrates.  He has been afebrile, heart rate 

59, blood pressure 102/59.  Repeat d-dimer 1.53.  Blood sugars are well 

controlled.





12/15: Patient remains on nonrebreather and AirVo with pulse ox at 90%.  Patient

was agreeable.  He has been afebrile, heart rate 70, blood pressure 131/79.  CBG

running between 97 and 116 and CBGs and insulin will be discontinued.  Sputum 

cultures positive for Candida albicans.  Patient is increasing his activity, 

currently laying in bed on his side.  Lab work including inflammatory markers 

ordered for tomorrow.





12/16: Patient had a drop in his pulse ox and 78% with nonrebreather and AirVo 

and A-Team was called.  Patient stated that his shortness of breath was better 

than the night before.  Stat chest x-ray was done which revealed bilateral 

multifocal peripheral increased opacities consistent with Covid 19 infection are

redemonstrated.  No significant change.  Patient transitioned to BiPAP with 

pulse ox of 90%.


Patient verbalized that he wanted to be a no CODE STATUS and was encouraged to 

discuss with family.  It was determined the patient will be transferred to the 

intensive care unit most likely be intubated.  Patient was very resistant but 

after long discussion with Dr. Austin wheeler, patient agreed to intubation and

full CODE STATUS at this point.  Repeat blood work reveals WBC 9.4, hemoglobin 

12.7, platelet count 263.  D-dimer 1.31.  Blood sugars





ROS


Constitutional: Denies chills, denies fever, reports malaise, denies nausea and 

vomiting, reports generalized fatigue


Eyes: denies decreased vision, denies diplopia, denies discharge, denies pain


Ears: deny: decreased hearing


Ears, nose, mouth and throat: Denies dental pain, Denies headache, Denies nasal 

discharge, Denies nose pain


Cardiovascular: Denies chest pain, Denies decreased exercise tolerance, Denies 

edema, Denies high blood pressure, Denies irregular heart beat, Denies 

palpitations, Denies paroxysmal nocturnal dyspnea, Denies rapid heart beat, 

Denies shortness of breath


Respiratory: Denies congestion, reports cough, Denies cough with sputum, reports

dyspnea, Denies home oxygen, Denies wheezing, dyspnea with minimal exertion


Gastrointestinal: Denies abdominal pain, Denies coffee ground emesis, Denies 

early satiety, Denies excessive gas, Denies heartburn, Denies hematemesis, 

Denies hematochezia, Denies loss of appetite, endorses nausea and vomiting


Genitourinary: Denies dysuria, Denies flank pain, Denies kidney stones, Denies 

menorrhagia, Denies urgency, Denies urinary frequency


Musculoskeletal: Denies gait dysfunction, Denies limitation of motion, Denies 

morning stiffness, Denies muscle cramps


Integumentary: Denies rash, Denies wounds, Denies brittle nails, Denies change 

in hair/nails, Denies darkening of skin


Neurological: Denies balance difficulties, Denies change in speech, Denies 

double vision, Denies gait dysfunction, Denies loss of vision, Denies motor 

disturbance, Denies numbness, Denies paralysis, Denies paresthesias, Denies 

seizures


Psychiatric: Denies anxiety, Denies depression


Endocrine: Denies excessive sweating, Denies excessive thirst, Denies high blood

sugars, Denies palpitations


Hematologic/Lymphatic: Denies easy bruising, Denies lymphadenopathy





Physical exam


General Appearance: Alert, cooperative, mild distress, appears stated age.  

Patient is sitting on the edge of the bed with moderate to severe accessory 

muscle usage, on BiPAP.


Neck HEENT: Supple, no lymphadenopathy, no thyroid enlargement, no carotid 

bruits.


Lungs: Bilateral crackles to the bases, no deformity.


Chest Wall: Chest wall normal expansion with deep inspiration no tenderness and 

no deformity was found on exam, no costochondral pain or discomfort.


Heart: Regular rate and rhythm, S1, S2 normal, no murmur, rub or gallop.


Back: Symmetric, no curvature, ROM normal, no CVA tenderness.


Abdomen: Soft, non-tender, no rebound or rigidity, no hepatosplenomegaly.


Extremities: Extremities normal, atraumatic, no cyanosis or edema.


Pulses: 2+ and symmetric.


Skin: Skin color, texture, tugor normal, no rashes or lesions.


Neurologic: Alert oriented x3 cranial nerves II through XII intact, no motor 

deficit, no abnormal balance or gait





Assessment and plan


1. Acute hypoxic respiratory failure secondary to Covid 19 pneumonia.  Consult 

with pulmonary medicine appreciated.  Oxygen therapy, transitioned to bipap. A-

Team was called this morning for hypoxia.  Transferred to the intensive care 

unit and probable intubation.





2. Acute COVID pneumonia. Dexamethasone 6 mg IV twice a day,  Lovenox 40 subcu 

twice a day, Not a candidate for Baricitinib, Monitor inflammatory markers. 

Continue supplements vitamin C, D and zinc





3. Acute transaminitis Secondary to viral inflammation. Continue CMP monitoring





4. CK D3.  Continue to monitor patient's creatinine.  No acute kidney injury





5. History of fibromyalgia. continue gabapentin 800 3 times a day, Citalopram 40

mg by mouth daily, Tramadol 100 mg every 6 hours as needed





6. Insomnia. Continue Eszopiclone 3 mg daily at bedtime 





7. Hypothyroidism. Levothyroxine 50 g by mouth daily





8. DVT prophylaxis. Lovenox 40 subcu twice a day





9. GI prophylaxis. Pepcid 20 mg by mouth daily





Discharge Plan: 


more than likely home





Impression and plan of care have been directed as dictated by the signing 

physician.  Babita Hawkins nurse practitioner acting as scribe for signing 

physician.








Objective





- Vital Signs


Vital signs: 


                                   Vital Signs











Temp  98.2 F   12/16/21 06:00


 


Pulse  65   12/16/21 07:40


 


Resp  28 H  12/16/21 07:40


 


BP  105/63   12/16/21 07:40


 


Pulse Ox  90 L  12/16/21 08:52








                                 Intake & Output











 12/15/21 12/16/21 12/16/21





 18:59 06:59 18:59


 


Intake Total 360  


 


Balance 360  


 


Weight 111.13 kg  


 


Intake:   


 


  Oral 360  


 


Other:   


 


  Voiding Method  Urinal Urinal


 


  # Voids 2 2 














- Labs


CBC & Chem 7: 


                                 12/16/21 05:24





                                 12/16/21 05:24


Labs: 


                  Abnormal Lab Results - Last 24 Hours (Table)











  12/16/21 12/16/21 Range/Units





  05:24 05:24 


 


RBC   4.26 L  (4.40-5.60)  X 10*6/uL


 


Hgb   12.7 L  (13.0-17.0)  g/dL


 


Hct   38.0 L  (39.6-50.0)  %


 


Immature Gran #   0.13 H  (0.00-0.04)  X 10*3/uL


 


Neutrophils #   8.64 H  (1.80-7.70)  X 10*3/uL


 


Lymphocytes #   0.42 L  (0.90-5.00)  X 10*3/uL


 


Eosinophils #   0 L  (0.04-0.35)  X 10*3/uL


 


D-Dimer  1.31 H   (<0.60)  mg/L FEU








                      Microbiology - Last 24 Hours (Table)











 12/12/21 12:26 Blood Culture - Preliminary





 Blood    No Growth after 72 hours


 


 12/12/21 12:29 Blood Culture - Preliminary





 Blood    No Growth after 72 hours

## 2021-12-16 NOTE — PCN
PROCEDURE NOTE



PULMONARY/CRITICAL CARE PROCEDURE NOTE:



PLACEMENT OF RIGHT RADIAL ARTERIAL LINE:



PREOPERATIVE DIAGNOSIS:

Frequent blood draws and blood gas monitoring.



POSTOPERATIVE DIAGNOSIS:

Frequent blood draws and blood gas monitoring.



OPERATORS:

1. Dr. Fragoso.

2. Dr. Brody.



PROCEDURE DESCRIPTION:

A time-out was completed verifying correct patient, procedure, site, positioning, and

implant(s) or special equipment if applicable.



Austin's test was performed to ensure adequate perfusion.  The patient's right wrist was

prepped and draped in sterile fashion.  1% Lidocaine was used to anesthetize the area.

An 18G Arrow arterial line was introduced into the right radial artery.  The catheter

was threaded over the guide wire and the needle was removed with appropriate pulsatile

blood return.  Blood loss was minimal.  The catheter was then sutured in place to the

skin and a sterile dressing was applied by the nurse.  Perfusion to the extremity

distal to the point of catheter insertion was checked and found to be adequate.



The patient tolerated the procedure well and there were no immediate complications.





MMODL / IJN: [id number] / Job#: [id number]

## 2021-12-16 NOTE — XR
EXAMINATION TYPE: XR chest 1V portable

 

DATE OF EXAM: 12/16/2021

 

CLINICAL HISTORY: Central line placement.  

 

TECHNIQUE: Single AP portable semiupright view of the chest is obtained.

 

COMPARISON: Chest x-ray from earlier today and older studies

 

FINDINGS:  New Left subclavian central venous catheter terminates in SVC. No pneumothorax noted.

 

Stable endotracheal and orogastric tubes.

 

Increased bilateral multifocal opacities greatest in the periphery and in the left lung redemonstrate
d. Findings stable from x-ray earlier today. Cardiac silhouette size is stable and within normal limi
ts. Osseous structures are intact.

 

IMPRESSION: As above.

## 2021-12-16 NOTE — CT
EXAMINATION TYPE: CT angio chest

 

DATE OF EXAM: 12/16/2021

 

COMPARISON: Chest x-ray from earlier today and older x-ray studies

 

HISTORY: Difficulty breathing, covid.

 

CT DLP: 790.8 mGycm. Automated Exposure Control for Dose Reduction was Utilized.

 

CONTRAST: 

CTA scan of the thorax is performed with IV Contrast, patient injected with 100 mL of Isovue 370, pul
monary embolism protocol.   MIP Images are created on CT scanner and reviewed.

 

FINDINGS:

 

LUNGS: Endotracheal tube terminates above the corby. Bilateral multifocal and confluent areas of kodi
undglass opacities and organizing consolidations redemonstrated. Findings more prominent in the left 
lung versus right lung. No pleural effusion or pneumothorax seen bilaterally

 

MEDIASTINUM: There is suboptimal bolus with most dense contrast in the SVC. No large saddle central p
ulmonary embolism. Cannot exclude partially occlusive lobar, segmental, and/or subsegmental pulmonary
 emboli due to poor bolus and heterogeneity. There are enlarged bilateral hilar lymph nodes presumed 
reactive. No cardiomegaly or pericardial effusion is seen.

 

OTHER: Nasogastric tube decompresses stomach.

 

IMPRESSION: 

1. Markedly suboptimal study without saddle central pulmonary embolism. Cannot exclude smaller pulmon
richy emboli.

2. Bilateral multifocal and confluent ground glass opacities and organizing consolidations consistent
 with known covid-19 infection are redemonstrated, no significant change from x-ray earlier today.

## 2021-12-16 NOTE — XR
EXAMINATION TYPE: XR chest 1V portable

 

DATE OF EXAM: 12/16/2021

 

CLINICAL HISTORY: Difficulty breathing and covid progress study.  

 

TECHNIQUE: Single AP portable upright view of the chest is obtained.

 

COMPARISON: Chest x-ray from 2 days earlier and older studies.

 

FINDINGS:  Increased bilateral multifocal opacities redemonstrated. Cardiac silhouette size is stable
 and within normal limits. Osseous structures are intact.

 

IMPRESSION: Bilateral multifocal peripheral increased opacities consistent with covid-19 infection ar
e redemonstrated. No significant change from most recent x-ray.

## 2021-12-16 NOTE — XR
EXAMINATION TYPE: XR chest 1V portable

 

DATE OF EXAM: 12/16/2021

 

CLINICAL HISTORY: Difficulty breathing had to be intubated.  

 

TECHNIQUE: Single AP portable semiupright view of the chest is obtained.

 

COMPARISON: Chest x-ray from earlier today and older studies

 

FINDINGS:  New Endotracheal tube terminates inferior clavicular level/ superior aortic knob level reena
roximately 3 to 4 cm above corby.  New Orogastric tube projects below diaphragm.

 

Increased bilateral multifocal opacities greatest in the periphery redemonstrated. Cardiac silhouette
 size is stable and within normal limits. Osseous structures are intact.

 

IMPRESSION: 

1. New endotracheal and orogastric tubes in satisfactory position.

2. Bilateral multifocal family peripheral increased opacities consistent with covid-19 infection are 
redemonstrated. No significant change from earlier today.

## 2021-12-16 NOTE — PCN
PROCEDURE NOTE



PULMONARY/CRITICAL CARE PROCEDURE NOTE:



PLACEMENT OF LEFT SUBCLAVIAN TRIPLE-LUMEN CATHETER:



PREOPERATIVE DIAGNOSIS:

Administration of fluids and pressors.



POSTOPERATIVE DIAGNOSIS:

Administration of fluids and pressors.



:

Dr. Fragoso.



TRIPLE LUMEN CATHETER PLACEMENT:

A time-out was completed verifying correct patient, procedure, site, positioning, and

implant(s) or special equipment if applicable.



The patient was placed in a dependent position appropriate for triple lumen catheter

placement based on the vein to be cannulated.  The patient's left shoulder was prepped

and draped in sterile fashion.  1% Lidocaine was used to anesthetize the surrounding

skin area.  A triple lumen 9F Cordis catheter was introduced into the left subclavian

vein using Seldinger technique.  The catheter was threaded smoothly over the guide wire

and appropriate blood return was obtained.  There was good blood return from all 3

ports. Each lumen of the catheter was evacuated of air and flushed with sterile saline.

The catheter was then sutured in place to the skin and a sterile dressing applied by

the nurse.  Perfusion to the extremity distal to the point of catheter insertion was

checked and found to be adequate.



There was no immediate complication. A chest x-ray was ordered to check placement of

the tip of the catheter.  The patient tolerated the procedure very well.





MMODL / IJN: [id number] / Job#: [id number]

## 2021-12-16 NOTE — P.EN
A- team:





Indication: Hypoxemia





Arrived on Scene to find: Patient with maxed out airvo setting at 90/60 and NRB 

with SPO2 at 78%





Patient seen and examined at bedside. He reports shortness of breath is better 

than last night.  No longer light heaaded dizzy or nauseated. He is agreeable to

bipap therapy 








Vital signs reviewed


General: ill appearing, moderated distress, appears at stated age


Derm: warm, dry


Head: atraumatic, normocephalic, symmetric


Eyes: EOMI, no lid lag, anicteric sclera


Mouth: no lip lesion, mucus membranes dry


Cardiovascular: S1S2 reg, no murmur, positive posterior tibial pulse bilateral, 


Lungs: course bs bilateral,  + accessory muscle use, + 3 word conversation 

dyspnea 


Psych: Alert, oriented, appropriate affect intacting thinking and reasoning 





Assessment: 


COVID-19 pneumonia


Acute hypoxic respiratory failure 





Plan:


Stat CXR reviewed by myself bilateral interstitial infiltrated 


- Lab work has been drawn and is pending except d-dimer which is unchange


- d-dimer elevated but patient unable to layflat on bipap without increased 

respiratory distress and hypoxia (O2 at 76% when laying flat)


- Patient states that he does not want intubation/life support, NP from primary 

service will ensure fmaily is aware of pt wishes as he states that they are not 








Disposition: 


Remain on 4S





Notified: 


FERNANDO Jc for admitting service


FERNANDO Yuan pulm/CC 





A Total of 32 minutes of critical care time was spent on the complex care of 

this patient.

## 2021-12-17 LAB
ALBUMIN SERPL-MCNC: 2.9 G/DL (ref 3.5–5)
ALP SERPL-CCNC: 115 U/L (ref 38–126)
ALT SERPL-CCNC: 217 U/L (ref 4–49)
ANION GAP SERPL CALC-SCNC: 7 MMOL/L
AST SERPL-CCNC: 52 U/L (ref 17–59)
BASOPHILS # BLD AUTO: 0 K/UL (ref 0–0.2)
BASOPHILS NFR BLD AUTO: 0 %
BUN SERPL-SCNC: 29 MG/DL (ref 9–20)
CALCIUM SPEC-MCNC: 8.5 MG/DL (ref 8.4–10.2)
CHLORIDE SERPL-SCNC: 104 MMOL/L (ref 98–107)
CO2 BLDA-SCNC: 29 MMOL/L (ref 19–24)
CO2 SERPL-SCNC: 24 MMOL/L (ref 22–30)
EOSINOPHIL # BLD AUTO: 0 K/UL (ref 0–0.7)
EOSINOPHIL NFR BLD AUTO: 0 %
ERYTHROCYTE [DISTWIDTH] IN BLOOD BY AUTOMATED COUNT: 4.26 M/UL (ref 4.3–5.9)
ERYTHROCYTE [DISTWIDTH] IN BLOOD: 13.4 % (ref 11.5–15.5)
GLUCOSE BLD-MCNC: 109 MG/DL (ref 75–99)
GLUCOSE BLD-MCNC: 116 MG/DL (ref 75–99)
GLUCOSE BLD-MCNC: 123 MG/DL (ref 75–99)
GLUCOSE BLD-MCNC: 124 MG/DL (ref 75–99)
GLUCOSE SERPL-MCNC: 153 MG/DL (ref 74–99)
HCO3 BLDA-SCNC: 27 MMOL/L (ref 21–25)
HCT VFR BLD AUTO: 40.4 % (ref 39–53)
HGB BLD-MCNC: 13.1 GM/DL (ref 13–17.5)
LYMPHOCYTES # SPEC AUTO: 0.3 K/UL (ref 1–4.8)
LYMPHOCYTES NFR SPEC AUTO: 3 %
MAGNESIUM SPEC-SCNC: 2.6 MG/DL (ref 1.6–2.3)
MCH RBC QN AUTO: 30.7 PG (ref 25–35)
MCHC RBC AUTO-ENTMCNC: 32.4 G/DL (ref 31–37)
MCV RBC AUTO: 94.8 FL (ref 80–100)
MONOCYTES # BLD AUTO: 0.4 K/UL (ref 0–1)
MONOCYTES NFR BLD AUTO: 3 %
NEUTROPHILS # BLD AUTO: 12.4 K/UL (ref 1.3–7.7)
NEUTROPHILS NFR BLD AUTO: 94 %
PCO2 BLDA: 57 MMHG (ref 35–45)
PH BLDA: 7.29 [PH] (ref 7.35–7.45)
PLATELET # BLD AUTO: 381 K/UL (ref 150–450)
PO2 BLDA: 97 MMHG (ref 83–108)
POTASSIUM SERPL-SCNC: 5.2 MMOL/L (ref 3.5–5.1)
PROT SERPL-MCNC: 6.2 G/DL (ref 6.3–8.2)
SODIUM SERPL-SCNC: 135 MMOL/L (ref 137–145)
WBC # BLD AUTO: 13.1 K/UL (ref 3.8–10.6)

## 2021-12-17 RX ADMIN — CEFAZOLIN SCH MLS/HR: 330 INJECTION, POWDER, FOR SOLUTION INTRAMUSCULAR; INTRAVENOUS at 16:36

## 2021-12-17 RX ADMIN — LEVOTHYROXINE SODIUM SCH MCG: 50 TABLET ORAL at 06:31

## 2021-12-17 RX ADMIN — CISATRACURIUM BESYLATE SCH MLS/HR: 10 INJECTION INTRAVENOUS at 07:40

## 2021-12-17 RX ADMIN — CITALOPRAM HYDROBROMIDE SCH MG: 20 TABLET ORAL at 10:07

## 2021-12-17 RX ADMIN — INSULIN ASPART SCH: 100 INJECTION, SOLUTION INTRAVENOUS; SUBCUTANEOUS at 23:56

## 2021-12-17 RX ADMIN — PANTOPRAZOLE SODIUM SCH MG: 40 INJECTION, POWDER, FOR SOLUTION INTRAVENOUS at 10:07

## 2021-12-17 RX ADMIN — ALBUTEROL SULFATE SCH PUFF: 90 AEROSOL, METERED RESPIRATORY (INHALATION) at 12:49

## 2021-12-17 RX ADMIN — GABAPENTIN SCH MG: 400 CAPSULE ORAL at 10:07

## 2021-12-17 RX ADMIN — GABAPENTIN SCH MG: 400 CAPSULE ORAL at 17:16

## 2021-12-17 RX ADMIN — SODIUM CHLORIDE SCH MLS/HR: 900 INJECTION, SOLUTION INTRAVENOUS at 16:33

## 2021-12-17 RX ADMIN — INSULIN ASPART SCH: 100 INJECTION, SOLUTION INTRAVENOUS; SUBCUTANEOUS at 18:26

## 2021-12-17 RX ADMIN — INSULIN ASPART SCH: 100 INJECTION, SOLUTION INTRAVENOUS; SUBCUTANEOUS at 06:22

## 2021-12-17 RX ADMIN — ALBUTEROL SULFATE SCH PUFF: 90 AEROSOL, METERED RESPIRATORY (INHALATION) at 19:23

## 2021-12-17 RX ADMIN — GABAPENTIN SCH MG: 400 CAPSULE ORAL at 20:25

## 2021-12-17 RX ADMIN — ENOXAPARIN SODIUM SCH MG: 40 INJECTION SUBCUTANEOUS at 10:07

## 2021-12-17 RX ADMIN — Medication SCH MG: at 10:06

## 2021-12-17 RX ADMIN — INSULIN ASPART SCH: 100 INJECTION, SOLUTION INTRAVENOUS; SUBCUTANEOUS at 11:06

## 2021-12-17 RX ADMIN — OXYCODONE HYDROCHLORIDE AND ACETAMINOPHEN SCH MG: 500 TABLET ORAL at 10:06

## 2021-12-17 RX ADMIN — CEFAZOLIN SCH MLS/HR: 330 INJECTION, POWDER, FOR SOLUTION INTRAMUSCULAR; INTRAVENOUS at 06:32

## 2021-12-17 RX ADMIN — SODIUM CHLORIDE SCH MLS/HR: 900 INJECTION, SOLUTION INTRAVENOUS at 21:28

## 2021-12-17 RX ADMIN — DEXTROSE SCH MG: 50 INJECTION, SOLUTION INTRAVENOUS at 10:07

## 2021-12-17 RX ADMIN — CHLORHEXIDINE GLUCONATE SCH ML: 1.2 RINSE ORAL at 20:25

## 2021-12-17 RX ADMIN — Medication SCH MCG: at 10:06

## 2021-12-17 RX ADMIN — ALBUTEROL SULFATE SCH PUFF: 90 AEROSOL, METERED RESPIRATORY (INHALATION) at 07:54

## 2021-12-17 RX ADMIN — CLEVIPIDINE SCH: 0.5 EMULSION INTRAVENOUS at 11:08

## 2021-12-17 RX ADMIN — SODIUM CHLORIDE SCH MLS/HR: 900 INJECTION, SOLUTION INTRAVENOUS at 12:07

## 2021-12-17 RX ADMIN — DEXTROSE SCH MG: 50 INJECTION, SOLUTION INTRAVENOUS at 20:25

## 2021-12-17 RX ADMIN — SODIUM CHLORIDE SCH MLS/HR: 900 INJECTION, SOLUTION INTRAVENOUS at 06:30

## 2021-12-17 RX ADMIN — CHLORHEXIDINE GLUCONATE SCH ML: 1.2 RINSE ORAL at 10:08

## 2021-12-17 NOTE — P.PN
Subjective


Progress Note Date: 12/17/21


Principal diagnosis: 


 Dyspnea, hypoxia, COVID-19





This is a very pleasant 56-year-old male patient with a known history of 

fibromyalgia.  Nonsmoker.  Approximate 1 week ago he developed symptoms of 

nausea vomiting diarrhea dizziness and sweating.  He presented here on 

12/07/2021 and has a positive for COVID-19 and received monoclonal antibodies.  

He came back to the emergency room last evening with worsening symptoms.  Chest 

x-ray reveals bilateral patchy interstitial and airspace infiltrates.  He is 

currently requiring 10 L high flow nasal cannula to maintain O2 saturations in 

the high 80s low 90s.  White count 6.6.  Hemoglobin 13.9.  Lymphocytes 0.3.  

Sodium 135.  Potassium 4.1.  Bicarb 21.  Creatinine 1.31.  Glucose 187.  

Ferritin 2822.  AST 89.  ALT 80.  LDH 1091.  C-reactive protein 21.0.  Initiated

on Decadron. 





On 12/10/2021 patient seen in follow-up on medical surgical floor, he is awake 

and alert, in no acute distress, overnight he wore 15 L high flow and n

onrebreather mask, he is currently off the nonrebreather mask and not he is on 

15 L high flow nasal cannula and he is maintaining O2 saturations at 93%, 

afebrile, hemodynamically stable.  However his oxygen requirements have 

significantly increased in last 24 hours.  His pro calcitonin level was sig

nificantly elevated at 9.75, and patient is not a candidate for Baricitinib in 

view of possibility of underlying bacterial infection.  Patient states she is 

coughing and bringing up some greenish colored phlegm at times, he is being 

started on Zosyn and vancomycin empirically, we will order blood cultures, 

sputum culture and urine cultures.  Clinically he looks fairly comfortable, no 

distress, lung sounds are revealing coarse diffuse rhonchi and rales.  No 

altered mentation, no fever.  He continues on Lovenox 40 mg twice daily, and 

Decadron 6 mg twice daily.





On 12/12/2021 patient seen in follow-up on medical surgical floor, is currently 

on 15 L of oxygen, he is not using the nonrebreather mask, his pulse ox is 90-

95%, patient has been self repositioning in bed, he states his breathing is 

about the same, maybe slightly improved, lung sounds are positive for fine rales

bilaterally, he remains on Zosyn for empiric antibiotic coverage, Decadron 6 

mggram twice daily, vancomycin has been discontinued.  Patient is afebrile, 

hemodynamically has been stable.  Tolerating oral intake, his labs have been 

reviewed today, his white blood cell count is 7.42, hemoglobin is 13.2, d-dimer 

is 1.4, electrolytes and renal profile are within normal limits, his liver 

enzymes have worsened on today's labs, and AST is up to 108, ALT is 182, 

alkaline phosphatase is actually improved and is down to 150.  His LDH is 

improved and is down to 413, and CRP is 4.1, improved compared to admission 

levels.  Pro calcitonin level is improving and is down to 1.34, urinalysis did 

not show evidence of infection.





On 12/13/2021 patient seen in follow-up on medical surgical floor, he remains on

high flow oxygen at 15 L, and 100% nonrebreather mask, his been pretty 

successful with self repositioning in bed, his pulse ox is around 96% on both 

oxygen delivery devices, at times it is near 99%, patient is mildly short of 

breath with any exertion, she does desaturate with any exertion, into the low 

80s recovers.  Other vitals have been stable, his been afebrile, has cough and 

congestion have improved, history and culture was sent, showing few PMNs and a 

few gram-positive cocci.  Patient remains on empiric antibiotic coverage with 

Zosyn only, vancomycin has been discontinued, blood cultures have shown no kodi

wth.  Follow-up pro-calcitonin will be obtained for tomorrow, physical exam 

reveals a few scattered rhonchi, improved from yesterday's exam.  No complaint 

of chest pain, no hemoptysis, no nausea vomiting or diarrhea.





On 12/15/2021 patient seen in follow-up on medical surgical floor.  Patient is 

laying on his left side, he remains on interval at 60 L and FiO2 of 90% and 

nonrebreather mask and his pulse ox is 86-93%.  Breathing fairly comfortably, 

although he has been quite limited in terms of activity tolerance.  Afebrile, 

hemodynamically has been stable, he continues on Decadron 6 mg twice daily, he 

continues on Zosyn for empiric antibiotic coverage is on prophylactic Lovenox 40

mg twice daily.  Sputum culture showed Candida albicans, blood cultures have 

been negative.  His pro calcitonin level was improving on previous labs.  No 

acute events overnight, his lower extremity Dopplers were negative for DVT.  His

d-dimer on yesterday's labs was 1.53.  His electrolytes and renal profile were 

unremarkable.





On 12/16/2021 patient seen in follow-up on medical surgical floor, rapid 

response team was called on him this morning, patient was placed on BiPAP 

support at 14 and 8 and FiO2 100%, patient's pulse ox has remained around 83%.  

This morning he was adamant about not being intubated, patient is a COVID-19 

pneumonia patient, she remains on Decadron 6 mg twice daily, Lovenox 40 mg twice

daily, he's been treated with empiric antibiotics for possibility of bacterial 

pneumonia, his pro calcitonin today is improved and is down to 0.17.  His 

inflammatory markers are improving, his LDH is down to 349, and CRP is 4.8.  

However despite that his oxygenation has worsened.  Yesterday CT angiogram was 

ordered however patient could not lie down flat, and the test was not completed.

 He is awake and alert, upon my arrival to the bedside, he is speaking to his 

son on the phone, he has changed his mind about his CODE STATUS, and he is 

agreeable to intubation if need be.  He is dyspneic, slightly tachypneic but 

does not appear to be in distress.  He remains on BiPAP support, and his O2 s

aturation has been around 82%, blood gas was completed showing pO2 of only 49, 

pCO2 of 34, and pH of 7.45.  Patient was urgently transferred to the intensive 

care unit and intubated on arrival.  He was placed on mechanical ventilator 

support with assist control and a rate of 28, tidal volume was 450, FiO2 100% 

and PEEP of 16.  His d-dimer today is 1.31.  His white count is 9.4, hemoglobin 

is 12.7.  His blood cultures from a few days ago have shown no growth, and his 

sputum culture from 12/12/2021 only showed Candida species.  Patient's son was 

notified about patient's change in condition, and that he has been moved to the 

ICU and intubated.  His condition is critical.  Follow-up chest x-ray after 

intubation and left subclavian central venous catheter placement showed 

increased bilateral multifocal opacities, no pneumothorax, and proper 

positioning of the left subclavian central venous catheter. 





On 12/17/2021 patient seen in follow-up in the intensive care unit, yesterday he

was emergently transferred to the intensive care unit, where he was intubated, 

this morning he remains sedated, intubated and paralyzed on assist-control mode 

of ventilation with a rate at 32, tidal volume is 450, FiO2 of percent and PEEP 

of 20.  This morning's blood gas shows a pO2 of 97, pCO2 of 57, and pH of 7.29. 

He is currently on fentanyl at 1 [place] per kilo per minute, Diprivan and is at 50 

mics per kilo per minute, Nimbex is at 1 [place] per kilo per minute, and 0.9 at 75 

ML per hour.  He is on vital high protein at 30 ML per hour with a goal of 35. 

He has been proned for 16 hours, tolerating perone well so far, his pulse ox is 

around 93%.  He is in sinus mechanism with a rate of 69.  Blood pressure is a 

bit hypertensive, possibly related to discomfort and anxiety, we'll adjust his 

fentanyl infusion drip.  He remains on Decadron 6 mg daily, she remains on 

prophylactic Lovenox 40 mg daily.  Today's labs have been reviewed showing white

blood cell count of 13.1, hemoglobin is 13.1, his d-dimer is 1.17, sodium is 

135, potassium is 5.2, chloride is 104, BUN is 29 creatinine 0.78.  His LDH and 

CRP were improving and yesterday's labs, CRP from today is 7.9, increased, LDH 

is still pending, his pro calcitonin level is 0.15.  No fever overnight.  CT 

angiogram has been completed showing markedly suboptimal study with LSAT of 

central pulmonary embolism, however smaller pulmonary emboli could not be 

excluded.  Lung windows shows bilateral multifocal confluent groundglass 

opacities and organizing consolidation consistent with known COVID 19 infection.

 Sputum culture showed only Candida albicans, blood cultures from 12/12/2021 

showed no growth.Baricitinib was discontinued a few days ago for possibility of 

bacterial infection.  Pro calcitonin level has improved.








Objective





- Vital Signs


Vital signs: 


                                   Vital Signs











Temp  97.2 F L  12/17/21 04:00


 


Pulse  56 L  12/17/21 07:00


 


Resp  32 H  12/17/21 07:00


 


BP  133/83   12/17/21 07:00


 


Pulse Ox  95   12/17/21 07:00








                                 Intake & Output











 12/16/21 12/17/21 12/17/21





 18:59 06:59 18:59


 


Intake Total 608 1861.045 297.128


 


Output Total 360 852 50


 


Balance 248 1009.045 247.128


 


Weight 123 kg  123 kg


 


Intake:   


 


   936 78


 


    Normal Saline 0.9 18 36 3





    Pressure Bag @ 3mL/hr   


 


    Sodium Chloride 0.9% 1, 470 900 75





    000 ml @ 75 mls/hr IV .   





    [id number] [facility] Rx#:[id number]   


 


  Intake, IV Titration 100 555.045 189.128





  Amount   


 


    Cisatracurium 200 mg In  74.126 55.789





    Sodium Chloride 0.9% 180   





    ml @ 1 MCG/KG/MIN 6.668   





    mls/hr IV .Q24H [facility] Rx#:   





    [id number]   


 


    fentaNYL (PF). 1,000 mcg  164.198 33.339





    In Sodium Chloride 0.9%   





    80 ml @ 0.5 MCG/KG/HR 5.   





    557 mls/hr IV .Q18H [facility]   





    Rx#:[id number]   


 


    propofoL 1,000 mg In 100 316.721 100





    Empty Bag 1 bag @ Titrate   





    IV .Q0M [facility] Rx#:   





    [id number]   


 


  Tube Feeding 20 280 30


 


  Other  90 


 


Output:   


 


  Urine 360 852 50


 


Other:   


 


  Voiding Method Indwelling Catheter Indwelling Catheter 


 


  # Voids 1  








                       ABP, PAP, CO, CI - Last Documented











Arterial Blood Pressure        149/66

















- Exam


 GENERAL EXAM: Intubated, sedated and paralyzed 56-year-old white male, on 

assist-control mode of ventilation with a rate of 28, tidal volume 450, FiO2 

100% and PEEP of 20


HEAD: Normocephalic/atraumatic.


EYES: Normal reaction of pupils, equal size.  Conjunctiva pink, sclera white.


NOSE: Clear with pink turbinates.


THROAT: No erythema or exudates.


NECK: No masses, no JVD, no thyroid enlargement, no adenopathy.


CHEST: No chest wall deformity.  Symmetrical expansion. Left subclavian central 

line in place


LUNGS: Equal air entry with diffuse crackles, and rhonchi, no wheezing


CVS: Regular rate and rhythm, normal S1 and S2, no gallops, no murmurs, no rubs


ABDOMEN: Soft, nontender.  No hepatosplenomegaly, normal bowel sounds, no 

guarding or rigidity.


EXTREMITIES: No clubbing, no edema, no cyanosis, 2+ pulses and upper and lower 

extremities.


MUSCULOSKELETAL: Muscle strength and tone normal.


SPINE: No scoliosis or deformity


SKIN: No rashes


CENTRAL NERVOUS SYSTEM: Sedated, paralyzed, and intubated No focal deficits, 

tone is normal in all 4 extremities.











- Labs


CBC & Chem 7: 


                                 12/17/21 03:25





                                 12/17/21 03:25


Labs: 


                  Abnormal Lab Results - Last 24 Hours (Table)











  12/16/21 12/16/21 12/16/21 Range/Units





  13:22 13:33 18:08 


 


WBC     (3.8-10.6)  k/uL


 


RBC     (4.30-5.90)  m/uL


 


Neutrophils #     (1.3-7.7)  k/uL


 


Lymphocytes #     (1.0-4.8)  k/uL


 


D-Dimer     (<0.60)  mg/L FEU


 


ABG pH   7.18 L*   (7.35-7.45)  


 


ABG pCO2   64 H   (35-45)  mmHg


 


ABG pO2   79 L   ()  mmHg


 


ABG HCO3     (21-25)  mmol/L


 


ABG Total CO2   26 H   (19-24)  mmol/L


 


ABG O2 Saturation   91.0 L   (94-97)  %


 


Sodium     (137-145)  mmol/L


 


Potassium     (3.5-5.1)  mmol/L


 


BUN     (9-20)  mg/dL


 


Glucose     (74-99)  mg/dL


 


POC Glucose (mg/dL)  122 H   137 H  (75-99)  mg/dL


 


Magnesium     (1.6-2.3)  mg/dL


 


ALT     (4-49)  U/L


 


C-Reactive Protein     (<1.0)  mg/dL


 


Total Protein     (6.3-8.2)  g/dL


 


Albumin     (3.5-5.0)  g/dL


 


Procalcitonin     (0.02-0.09)  ng/mL














  12/16/21 12/17/21 12/17/21 Range/Units





  23:38 03:25 03:25 


 


WBC     (3.8-10.6)  k/uL


 


RBC     (4.30-5.90)  m/uL


 


Neutrophils #     (1.3-7.7)  k/uL


 


Lymphocytes #     (1.0-4.8)  k/uL


 


D-Dimer     (<0.60)  mg/L FEU


 


ABG pH     (7.35-7.45)  


 


ABG pCO2     (35-45)  mmHg


 


ABG pO2     ()  mmHg


 


ABG HCO3     (21-25)  mmol/L


 


ABG Total CO2     (19-24)  mmol/L


 


ABG O2 Saturation     (94-97)  %


 


Sodium    135 L  (137-145)  mmol/L


 


Potassium    5.2 H  (3.5-5.1)  mmol/L


 


BUN    29 H  (9-20)  mg/dL


 


Glucose    153 H  (74-99)  mg/dL


 


POC Glucose (mg/dL)  150 H    (75-99)  mg/dL


 


Magnesium    2.6 H  (1.6-2.3)  mg/dL


 


ALT    217 H  (4-49)  U/L


 


C-Reactive Protein    7.9 H  (<1.0)  mg/dL


 


Total Protein    6.2 L  (6.3-8.2)  g/dL


 


Albumin    2.9 L  (3.5-5.0)  g/dL


 


Procalcitonin   0.15 H   (0.02-0.09)  ng/mL














  12/17/21 12/17/21 12/17/21 Range/Units





  03:25 03:25 05:51 


 


WBC  13.1 H    (3.8-10.6)  k/uL


 


RBC  4.26 L    (4.30-5.90)  m/uL


 


Neutrophils #  12.4 H    (1.3-7.7)  k/uL


 


Lymphocytes #  0.3 L    (1.0-4.8)  k/uL


 


D-Dimer   1.17 H   (<0.60)  mg/L FEU


 


ABG pH    7.29 L  (7.35-7.45)  


 


ABG pCO2    57 H  (35-45)  mmHg


 


ABG pO2     ()  mmHg


 


ABG HCO3    27 H  (21-25)  mmol/L


 


ABG Total CO2    29 H  (19-24)  mmol/L


 


ABG O2 Saturation    97.3 H  (94-97)  %


 


Sodium     (137-145)  mmol/L


 


Potassium     (3.5-5.1)  mmol/L


 


BUN     (9-20)  mg/dL


 


Glucose     (74-99)  mg/dL


 


POC Glucose (mg/dL)     (75-99)  mg/dL


 


Magnesium     (1.6-2.3)  mg/dL


 


ALT     (4-49)  U/L


 


C-Reactive Protein     (<1.0)  mg/dL


 


Total Protein     (6.3-8.2)  g/dL


 


Albumin     (3.5-5.0)  g/dL


 


Procalcitonin     (0.02-0.09)  ng/mL














  12/17/21 12/17/21 Range/Units





  05:52 11:05 


 


WBC    (3.8-10.6)  k/uL


 


RBC    (4.30-5.90)  m/uL


 


Neutrophils #    (1.3-7.7)  k/uL


 


Lymphocytes #    (1.0-4.8)  k/uL


 


D-Dimer    (<0.60)  mg/L FEU


 


ABG pH    (7.35-7.45)  


 


ABG pCO2    (35-45)  mmHg


 


ABG pO2    ()  mmHg


 


ABG HCO3    (21-25)  mmol/L


 


ABG Total CO2    (19-24)  mmol/L


 


ABG O2 Saturation    (94-97)  %


 


Sodium    (137-145)  mmol/L


 


Potassium    (3.5-5.1)  mmol/L


 


BUN    (9-20)  mg/dL


 


Glucose    (74-99)  mg/dL


 


POC Glucose (mg/dL)  123 H  116 H  (75-99)  mg/dL


 


Magnesium    (1.6-2.3)  mg/dL


 


ALT    (4-49)  U/L


 


C-Reactive Protein    (<1.0)  mg/dL


 


Total Protein    (6.3-8.2)  g/dL


 


Albumin    (3.5-5.0)  g/dL


 


Procalcitonin    (0.02-0.09)  ng/mL








                      Microbiology - Last 24 Hours (Table)











 12/12/21 12:26 Blood Culture - Preliminary





 Blood    No Growth after 96 hours


 


 12/12/21 12:29 Blood Culture - Preliminary





 Blood    No Growth after 96 hours














Assessment and Plan


Plan: 


 Assessment:





#1.  Acute hypoxic respiratory failure secondary to acute COVID-19 pneumonia.  

Patient is status post monoclonal antibody infusion on 12/07/2021.  Patient was 

not a candidate for Remdesivir related to severe hypoxic respiratory failure on 

presentation requiring 10 L high flow nasal cannula.  Today he is on 15 L and 

100% nonrebreather mask, his FiO2 requirements have increased but patient is not

a candidate for Baricitinib related to possibility of underlying bacterial 

infection.  Patient was transferred to the intensive care unit on 12/16/2021 and

intubated on 12/16/2021





#2.  Elevated pro-calcitonin, improved, blood and sputum cultures have shown no 

growth, patient has received empiric Zosyn, pro-calcitonin level has improved 

and is down to 0.17, we'll discontinue Zosyn





#3.  Elevated inflammatory markers related to acute COVID-19 pneumonia





#4.  Elevated LFTs related to COVID-19 pneumonia, slightly worsened





#5.  Elevated d-dimer, venous Doppler of bilateral lower extremities was 

negative for DVT, CT angiogram was a limited study but showed no central 

pulmonary embolism





#6.  History of fibromyalgia





#7.  Depression





#8.  Never smoker





#9.  Acute kidney injury possibly related to Covid 19 related ATN, improved





#10.  Hypothyroidism





#11.  Hyperkalemia, mild, with potassium of 5.2





Plan:





Continue current vent settings


Weaning FiO2 to keep O2 sats is at 88% and above


Continue sedation and Nimbex


Increase fentanyl to 3 mics per kilo per minute for possible discomfort


May use Cleviprex were hypertension


Continue proning for up to 16 hours a day, and patient has been tolerating it 

well so far


Continue nutritional support


We'll give 1 amp of sodium bicarb


Repeat chest x-ray today when the patient is on his back


Repeat blood gases in the morning, basic labs, inflammatory markers and d-dimer


Overall prognosis is guarded





I performed a history & physical examination of the patient and discussed their 

management with my nurse practitioner, Lissy Brody.  I reviewed the nurse 

practitioner's note and agree with the documented findings and plan of care.  

Lung sounds are positive for dim breath sounds throughout the lung fields.  The 

findings and the impression was discussed with the patient.  I attest to the 

documentation by the nurse practitioner. 








Time with Patient: Greater than 30

## 2021-12-17 NOTE — XR
EXAMINATION TYPE: XR chest 1V portable

 

DATE OF EXAM: 12/17/2021

 

CLINICAL HISTORY: Difficulty breathing and covid.  

 

TECHNIQUE: Single AP portable semiupright view of the chest is obtained.

 

COMPARISON: Chest x-ray and CTA chest from one day earlier.

 

FINDINGS: Stable endotracheal and orogastric tubes. Stable left subclavian central venous catheter.

 

Persistent bilateral multifocal and confluent increased opacities redemonstrated. Cardiac silhouette 
size is stable and within normal limits. Osseous structures are intact.

 

IMPRESSION: Bilateral multifocal confluent and increased opacities greatest in the periphery consiste
nt with covid-19 infection are redemonstrated. No significant change from one day earlier.

## 2021-12-17 NOTE — P.PN
Subjective


Progress Note Date: 12/17/21





History of present illness


56-year-old  male with past medical history of fibromyalgia comes in to

emergency room with symptoms of nausea, vomiting, dizziness and sweating feeling

weak in with runny nose loss of taste and hence now for the past 1 week.  He 

presented on 12/7 was found to have positive COVID results.  Received his 

monoclonal antibodies.  He Came back to the emergency room because of worsening 

of symptoms.Vitals reviewed patient afebrile pulse 80 respiratory rate 20 blood 

pressure 138/82 saturating at 88% on 10 L labs reviewed WBC 6.6 hemoglobin 13.9 

d-dimer is elevated at 0.89, sodium 135 bicarb 21 BUN 16 creatinine 1.3 glucose 

187 ferritin 2822 AST 89 ALT 80 alkaline phosphatase 225 LDH 1091 and CRP 21 and

albumin 3.3


X-ray suggestive of bilateral patchy pneumonia.  Both interstitial and airspace 

infiltrate


Pulmonary clinic consulted for COVID pneumonia


Patient placed on Lovenox 40 subcu twice a day, vitamin C, D and zinc.  

Dexamethasone initiated at 6 mg twice a day IV


Patient is a candidate of Baritinib and will discuss about initiating it with 

pulmonary


Venous Doppler ordered





12/10: [facility].  He continues to have 

dyspnea and is on 15 L high flow nasal cannula and nonrebreather with pulse ox 

of 87 and 95%.  He's been afebrile, heart rate in the 60s and 70s, blood 

pressure 127/73.  Pro-calcitonin came back high at 9.75 and we started the 

patient on Zosyn and vancomycin for bacterial pneumonia coverage.  Patient has 

coarse crackles bilaterally.  No lower extremity edema.  He has been seen and 

followed by pulmonary medicine.  Patient is not a candidate for Baricitinib.  

Patient is continued on Decadron, Lovenox and vitamin supplements.





12/11: Is found sitting up in the edge of the bed.  He is currently not on a 

Ventimask.  However his pulse oxing still around 88 to 91 % on Ventimask or 15 L

high flow nasal cannula. Patient continues to have dyspnea with activity and 

speaking. Patient states that he is feeling much better compared to yesterday.  

Continues to have a cough.  No lower extremity edema.  He has rhonchi to the 

bases bilaterally.  He is currently on Decadron Lovenox and vitamin supplements.

 Patient remains afebrile, heart rate 62, respirations 17, blood pressure 118/68

pulse ox 93% on 15 L high flow nasal cannula





12/12: Patient was up to the bathroom showering today.  Awaiting inflammation 

markers today.  Patient still requires 10 L of O2 with a pulse ox of 90%.  

Patient continues to have crackles to the bilateral bases.  Dyspnea with 

activity and speaking.  Patient states he is feeling better however he is very 

tired.  Continues to have cough no lower extremity edema.  He still currently on

Decadron, Lovenox, and vitamin supplements.  Patient remains afebrile, heart 

rate 58, respirations 17, blood pressure 118/89,





12/13: Patient is still on high flow nasal cannula 15 L and nonrebreather with 

pulse ox of 89 and 99%.  He's been afebrile, heart rate 73, blood pressure 

120/72.  Creatinine 1.06.  Blood sugars running between 101 and 106.  Sputum 

culture is in progress.  Patient is followed closely by pulmonary medicine.  

Patient is continued on dexamethasone, Lovenox, Zosyn and vitamin supplements.  

Patient has been encouraged to prone several hours daily.





12/14: Patient remains on nonrebreather mask and AirVo at 60 L, FiO2 of 85 with 

pulse ox of 88%.  Appears to have more difficulty with breathing.  Repeat chest 

x-ray reveals stable bilateral infiltrates.  He has been afebrile, heart rate 

59, blood pressure 102/59.  Repeat d-dimer 1.53.  Blood sugars are well 

controlled.





12/15: Patient remains on nonrebreather and AirVo with pulse ox at 90%.  Patient

was agreeable.  He has been afebrile, heart rate 70, blood pressure 131/79.  CBG

running between 97 and 116 and CBGs and insulin will be discontinued.  Sputum 

cultures positive for Candida albicans.  Patient is increasing his activity, 

currently laying in bed on his side.  Lab work including inflammatory markers 

ordered for tomorrow.





12/16: Patient had a drop in his pulse ox and 78% with nonrebreather and AirVo 

and A-Team was called.  Patient stated that his shortness of breath was better 

than the night before.  Stat chest x-ray was done which revealed bilateral 

multifocal peripheral increased opacities consistent with Covid 19 infection are

redemonstrated.  No significant change.  Patient transitioned to BiPAP with 

pulse ox of 90%.


Patient verbalized that he wanted to be a no CODE STATUS and was encouraged to 

discuss with family.  It was determined the patient will be transferred to the 

intensive care unit most likely be intubated.  Patient was very resistant but 

after long discussion with Dr. Austin wheeler, patient agreed to intubation and

full CODE STATUS at this point.  Repeat blood work reveals WBC 9.4, hemoglobin 

12.7, platelet count 263.  D-dimer 1.31.  Blood sugars





12/17: Patient was transferred into the intensive care unit yesterday and 

subsequently intubated and placed on mechanical ventilation, tidal volume 450, 

FiO2 100, PEEP of 20.  Patient was prone this morning switched over to supine.  

He was started on tube feedings yesterday but had a high residual this morning 

after pronating.  He is currently on fentanyl, propofol, Nimbex drip.  One amp 

of bicarbonate given this morning.


Chest x-ray reveals lateral multifocal confluent and increased opacities 

greatest in the periphery consistent with Covid 19 infection redemonstrated.  No

sicca be unchanged from one day earlier.  


CTA of the chest done yesterday reveals suboptimal study without saddle central 

pulmonary embolism.  Cannot exclude smaller pulmonary emboli.  Bilateral 

multifocal and confluent groundglass opacities and organizing consolidations 

consistent with known Covid 19 infection.


Repeat blood work reveals WBC 13.1, hemoglobin 13.1, platelet count 381.  D-

dimer 1.17.  Sodium 135, potassium 5.2, chloride 104, CO2 24, BUN 29 and 

creatinine 0.78.  Capillary blood glucose running between 116 and 150s.  AST 

216.  C-reactive protein 7.9, pro-calcitonin 0.15.








ROS


Unable to obtain due to intubation





Physical exam


General Appearance: Patient is laying in the ICU bed, intubated and on 

mechanical ventilation.  No acute distress.  Patient appears to be comfortable. 


Full examination deferred to intensive this due to intubation and Covid 19.





Assessment and plan


1. Acute hypoxic respiratory failure secondary to Covid 19 pneumonia.  Consult 

with pulmonary medicine appreciated.  Patient was intubated and placed on 

mechanical ventilation 12/16.  Continue care in the intensive care unit, 

proning, currently on fentanyl, propofol and Nimbex.





2. Acute COVID pneumonia. Dexamethasone 6 mg IV twice a day,  Lovenox 40 subcu 

daily, Not a candidate for Baricitinib, Monitor inflammatory markers. Continue 

supplements vitamin C, D and zinc





3. Acute transaminitis Secondary to viral inflammation. Continue monitoring





4. CKD stage 3.  Continue to monitor patient's creatinine.  No acute kidney 

injury





5. History of fibromyalgia. continue gabapentin 800 3 times a day, Citalopram 40

mg by mouth daily





6. Insomnia. Was on Eszopiclone 3 mg daily at bedtime 





7. Hypothyroidism. Levothyroxine 50 g by mouth daily





8. DVT prophylaxis. Lovenox 40 subcu daily.





9. GI prophylaxis.  Protonix 40 mg IV push daily





10.  Hyperglycemia secondary to steroids, IV drips, patient is on NovoLog scale.





Prognosis guarded.





Status: Full code





Discharge Plan: 


To be determined.





Impression and plan of care have been directed as dictated by the signing 

physician.  Babita Hawkins nurse practitioner acting as scribe for signing 

physician.








Objective





- Vital Signs


Vital signs: 


                                   Vital Signs











Temp  97.2 F L  12/17/21 04:00


 


Pulse  56 L  12/17/21 07:00


 


Resp  32 H  12/17/21 07:00


 


BP  133/83   12/17/21 07:00


 


Pulse Ox  95   12/17/21 07:00








                                 Intake & Output











 12/16/21 12/17/21 12/17/21





 18:59 06:59 18:59


 


Intake Total 608 1861.045 297.128


 


Output Total 360 852 50


 


Balance 248 1009.045 247.128


 


Weight 123 kg  123 kg


 


Intake:   


 


   936 78


 


    Normal Saline 0.9 18 36 3





    Pressure Bag @ 3mL/hr   


 


    Sodium Chloride 0.9% 1, 470 900 75





    000 ml @ 75 mls/hr IV .   





    [id number] [facility] Rx#:[id number]   


 


  Intake, IV Titration 100 555.045 189.128





  Amount   


 


    Cisatracurium 200 mg In  74.126 55.789





    Sodium Chloride 0.9% 180   





    ml @ 1 MCG/KG/MIN 6.668   





    mls/hr IV .Q24H [facility] Rx#:   





    [id number]   


 


    fentaNYL (PF). 1,000 mcg  164.198 33.339





    In Sodium Chloride 0.9%   





    80 ml @ 0.5 MCG/KG/HR 5.   





    557 mls/hr IV .Q18H [facility]   





    Rx#:[id number]   


 


    propofoL 1,000 mg In 100 316.721 100





    Empty Bag 1 bag @ Titrate   





    IV .Q0M [facility] Rx#:   





    [id number]   


 


  Tube Feeding 20 280 30


 


  Other  90 


 


Output:   


 


  Urine 360 852 50


 


Other:   


 


  Voiding Method Indwelling Catheter Indwelling Catheter 


 


  # Voids 1  








                       ABP, PAP, CO, CI - Last Documented











Arterial Blood Pressure        149/66

















- Labs


CBC & Chem 7: 


                                 12/17/21 03:25





                                 12/17/21 03:25


Labs: 


                  Abnormal Lab Results - Last 24 Hours (Table)











  12/16/21 12/16/21 12/16/21 Range/Units





  13:22 13:33 18:08 


 


WBC     (3.8-10.6)  k/uL


 


RBC     (4.30-5.90)  m/uL


 


Neutrophils #     (1.3-7.7)  k/uL


 


Lymphocytes #     (1.0-4.8)  k/uL


 


D-Dimer     (<0.60)  mg/L FEU


 


ABG pH   7.18 L*   (7.35-7.45)  


 


ABG pCO2   64 H   (35-45)  mmHg


 


ABG pO2   79 L   ()  mmHg


 


ABG HCO3     (21-25)  mmol/L


 


ABG Total CO2   26 H   (19-24)  mmol/L


 


ABG O2 Saturation   91.0 L   (94-97)  %


 


Sodium     (137-145)  mmol/L


 


Potassium     (3.5-5.1)  mmol/L


 


BUN     (9-20)  mg/dL


 


Glucose     (74-99)  mg/dL


 


POC Glucose (mg/dL)  122 H   137 H  (75-99)  mg/dL


 


Magnesium     (1.6-2.3)  mg/dL


 


ALT     (4-49)  U/L


 


C-Reactive Protein     (<1.0)  mg/dL


 


Total Protein     (6.3-8.2)  g/dL


 


Albumin     (3.5-5.0)  g/dL














  12/16/21 12/17/21 12/17/21 Range/Units





  23:38 03:25 03:25 


 


WBC    13.1 H  (3.8-10.6)  k/uL


 


RBC    4.26 L  (4.30-5.90)  m/uL


 


Neutrophils #    12.4 H  (1.3-7.7)  k/uL


 


Lymphocytes #    0.3 L  (1.0-4.8)  k/uL


 


D-Dimer     (<0.60)  mg/L FEU


 


ABG pH     (7.35-7.45)  


 


ABG pCO2     (35-45)  mmHg


 


ABG pO2     ()  mmHg


 


ABG HCO3     (21-25)  mmol/L


 


ABG Total CO2     (19-24)  mmol/L


 


ABG O2 Saturation     (94-97)  %


 


Sodium   135 L   (137-145)  mmol/L


 


Potassium   5.2 H   (3.5-5.1)  mmol/L


 


BUN   29 H   (9-20)  mg/dL


 


Glucose   153 H   (74-99)  mg/dL


 


POC Glucose (mg/dL)  150 H    (75-99)  mg/dL


 


Magnesium   2.6 H   (1.6-2.3)  mg/dL


 


ALT   217 H   (4-49)  U/L


 


C-Reactive Protein   7.9 H   (<1.0)  mg/dL


 


Total Protein   6.2 L   (6.3-8.2)  g/dL


 


Albumin   2.9 L   (3.5-5.0)  g/dL














  12/17/21 12/17/21 12/17/21 Range/Units





  03:25 05:51 05:52 


 


WBC     (3.8-10.6)  k/uL


 


RBC     (4.30-5.90)  m/uL


 


Neutrophils #     (1.3-7.7)  k/uL


 


Lymphocytes #     (1.0-4.8)  k/uL


 


D-Dimer  1.17 H    (<0.60)  mg/L FEU


 


ABG pH   7.29 L   (7.35-7.45)  


 


ABG pCO2   57 H   (35-45)  mmHg


 


ABG pO2     ()  mmHg


 


ABG HCO3   27 H   (21-25)  mmol/L


 


ABG Total CO2   29 H   (19-24)  mmol/L


 


ABG O2 Saturation   97.3 H   (94-97)  %


 


Sodium     (137-145)  mmol/L


 


Potassium     (3.5-5.1)  mmol/L


 


BUN     (9-20)  mg/dL


 


Glucose     (74-99)  mg/dL


 


POC Glucose (mg/dL)    123 H  (75-99)  mg/dL


 


Magnesium     (1.6-2.3)  mg/dL


 


ALT     (4-49)  U/L


 


C-Reactive Protein     (<1.0)  mg/dL


 


Total Protein     (6.3-8.2)  g/dL


 


Albumin     (3.5-5.0)  g/dL














  12/17/21 Range/Units





  11:05 


 


WBC   (3.8-10.6)  k/uL


 


RBC   (4.30-5.90)  m/uL


 


Neutrophils #   (1.3-7.7)  k/uL


 


Lymphocytes #   (1.0-4.8)  k/uL


 


D-Dimer   (<0.60)  mg/L FEU


 


ABG pH   (7.35-7.45)  


 


ABG pCO2   (35-45)  mmHg


 


ABG pO2   ()  mmHg


 


ABG HCO3   (21-25)  mmol/L


 


ABG Total CO2   (19-24)  mmol/L


 


ABG O2 Saturation   (94-97)  %


 


Sodium   (137-145)  mmol/L


 


Potassium   (3.5-5.1)  mmol/L


 


BUN   (9-20)  mg/dL


 


Glucose   (74-99)  mg/dL


 


POC Glucose (mg/dL)  116 H  (75-99)  mg/dL


 


Magnesium   (1.6-2.3)  mg/dL


 


ALT   (4-49)  U/L


 


C-Reactive Protein   (<1.0)  mg/dL


 


Total Protein   (6.3-8.2)  g/dL


 


Albumin   (3.5-5.0)  g/dL








                      Microbiology - Last 24 Hours (Table)











 12/12/21 12:26 Blood Culture - Preliminary





 Blood    No Growth after 96 hours


 


 12/12/21 12:29 Blood Culture - Preliminary





 Blood    No Growth after 96 hours

## 2021-12-18 LAB
ALBUMIN SERPL-MCNC: 2.7 G/DL (ref 3.5–5)
ALP SERPL-CCNC: 97 U/L (ref 38–126)
ALT SERPL-CCNC: 154 U/L (ref 4–49)
ANION GAP SERPL CALC-SCNC: 4 MMOL/L
AST SERPL-CCNC: 36 U/L (ref 17–59)
BASOPHILS # BLD AUTO: 0 K/UL (ref 0–0.2)
BASOPHILS NFR BLD AUTO: 0 %
BUN SERPL-SCNC: 29 MG/DL (ref 9–20)
CALCIUM SPEC-MCNC: 8.6 MG/DL (ref 8.4–10.2)
CHLORIDE SERPL-SCNC: 105 MMOL/L (ref 98–107)
CO2 BLDA-SCNC: 32 MMOL/L (ref 19–24)
CO2 SERPL-SCNC: 28 MMOL/L (ref 22–30)
EOSINOPHIL # BLD AUTO: 0 K/UL (ref 0–0.7)
EOSINOPHIL NFR BLD AUTO: 0 %
ERYTHROCYTE [DISTWIDTH] IN BLOOD BY AUTOMATED COUNT: 4.21 M/UL (ref 4.3–5.9)
ERYTHROCYTE [DISTWIDTH] IN BLOOD: 13.3 % (ref 11.5–15.5)
GLUCOSE BLD-MCNC: 120 MG/DL (ref 75–99)
GLUCOSE BLD-MCNC: 123 MG/DL (ref 75–99)
GLUCOSE BLD-MCNC: 92 MG/DL (ref 75–99)
GLUCOSE SERPL-MCNC: 127 MG/DL (ref 74–99)
HCO3 BLDA-SCNC: 30 MMOL/L (ref 21–25)
HCT VFR BLD AUTO: 39.8 % (ref 39–53)
HGB BLD-MCNC: 12.6 GM/DL (ref 13–17.5)
LYMPHOCYTES # SPEC AUTO: 0.2 K/UL (ref 1–4.8)
LYMPHOCYTES NFR SPEC AUTO: 2 %
MAGNESIUM SPEC-SCNC: 2.5 MG/DL (ref 1.6–2.3)
MCH RBC QN AUTO: 29.8 PG (ref 25–35)
MCHC RBC AUTO-ENTMCNC: 31.6 G/DL (ref 31–37)
MCV RBC AUTO: 94.5 FL (ref 80–100)
MONOCYTES # BLD AUTO: 0.4 K/UL (ref 0–1)
MONOCYTES NFR BLD AUTO: 3 %
NEUTROPHILS # BLD AUTO: 12.7 K/UL (ref 1.3–7.7)
NEUTROPHILS NFR BLD AUTO: 95 %
PCO2 BLDA: 60 MMHG (ref 35–45)
PH BLDA: 7.3 [PH] (ref 7.35–7.45)
PLATELET # BLD AUTO: 312 K/UL (ref 150–450)
PO2 BLDA: 119 MMHG (ref 83–108)
POTASSIUM SERPL-SCNC: 5.1 MMOL/L (ref 3.5–5.1)
PROT SERPL-MCNC: 5.9 G/DL (ref 6.3–8.2)
SODIUM SERPL-SCNC: 137 MMOL/L (ref 137–145)
WBC # BLD AUTO: 13.4 K/UL (ref 3.8–10.6)

## 2021-12-18 RX ADMIN — METOCLOPRAMIDE SCH MG: 5 INJECTION, SOLUTION INTRAMUSCULAR; INTRAVENOUS at 17:18

## 2021-12-18 RX ADMIN — OXYCODONE HYDROCHLORIDE AND ACETAMINOPHEN SCH MG: 500 TABLET ORAL at 08:45

## 2021-12-18 RX ADMIN — SODIUM CHLORIDE SCH MLS/HR: 900 INJECTION, SOLUTION INTRAVENOUS at 01:10

## 2021-12-18 RX ADMIN — Medication SCH MCG: at 08:45

## 2021-12-18 RX ADMIN — SODIUM CHLORIDE SCH MLS/HR: 900 INJECTION, SOLUTION INTRAVENOUS at 10:48

## 2021-12-18 RX ADMIN — CEFAZOLIN SCH MLS/HR: 330 INJECTION, POWDER, FOR SOLUTION INTRAMUSCULAR; INTRAVENOUS at 20:00

## 2021-12-18 RX ADMIN — INSULIN ASPART SCH: 100 INJECTION, SOLUTION INTRAVENOUS; SUBCUTANEOUS at 06:47

## 2021-12-18 RX ADMIN — Medication SCH MG: at 08:45

## 2021-12-18 RX ADMIN — GABAPENTIN SCH MG: 400 CAPSULE ORAL at 17:18

## 2021-12-18 RX ADMIN — METOCLOPRAMIDE SCH MG: 5 INJECTION, SOLUTION INTRAMUSCULAR; INTRAVENOUS at 23:04

## 2021-12-18 RX ADMIN — SODIUM CHLORIDE SCH MLS/HR: 900 INJECTION, SOLUTION INTRAVENOUS at 07:24

## 2021-12-18 RX ADMIN — CISATRACURIUM BESYLATE SCH MLS/HR: 10 INJECTION INTRAVENOUS at 08:44

## 2021-12-18 RX ADMIN — ENOXAPARIN SODIUM SCH MG: 40 INJECTION SUBCUTANEOUS at 08:44

## 2021-12-18 RX ADMIN — ALBUTEROL SULFATE SCH PUFF: 90 AEROSOL, METERED RESPIRATORY (INHALATION) at 09:52

## 2021-12-18 RX ADMIN — PANTOPRAZOLE SODIUM SCH MG: 40 INJECTION, POWDER, FOR SOLUTION INTRAVENOUS at 08:44

## 2021-12-18 RX ADMIN — CHLORHEXIDINE GLUCONATE SCH ML: 1.2 RINSE ORAL at 08:44

## 2021-12-18 RX ADMIN — SODIUM CHLORIDE SCH MLS/HR: 900 INJECTION, SOLUTION INTRAVENOUS at 13:54

## 2021-12-18 RX ADMIN — ALBUTEROL SULFATE SCH PUFF: 90 AEROSOL, METERED RESPIRATORY (INHALATION) at 19:42

## 2021-12-18 RX ADMIN — SODIUM CHLORIDE SCH MLS/HR: 900 INJECTION, SOLUTION INTRAVENOUS at 23:03

## 2021-12-18 RX ADMIN — GABAPENTIN SCH MG: 400 CAPSULE ORAL at 19:54

## 2021-12-18 RX ADMIN — DEXTROSE SCH MG: 50 INJECTION, SOLUTION INTRAVENOUS at 08:45

## 2021-12-18 RX ADMIN — ALBUTEROL SULFATE SCH PUFF: 90 AEROSOL, METERED RESPIRATORY (INHALATION) at 13:29

## 2021-12-18 RX ADMIN — INSULIN ASPART SCH: 100 INJECTION, SOLUTION INTRAVENOUS; SUBCUTANEOUS at 11:43

## 2021-12-18 RX ADMIN — SODIUM CHLORIDE SCH MLS/HR: 900 INJECTION, SOLUTION INTRAVENOUS at 17:03

## 2021-12-18 RX ADMIN — LEVOTHYROXINE SODIUM SCH MCG: 50 TABLET ORAL at 06:47

## 2021-12-18 RX ADMIN — METOCLOPRAMIDE SCH MG: 5 INJECTION, SOLUTION INTRAMUSCULAR; INTRAVENOUS at 11:55

## 2021-12-18 RX ADMIN — CHLORHEXIDINE GLUCONATE SCH ML: 1.2 RINSE ORAL at 19:55

## 2021-12-18 RX ADMIN — INSULIN ASPART SCH: 100 INJECTION, SOLUTION INTRAVENOUS; SUBCUTANEOUS at 18:57

## 2021-12-18 RX ADMIN — SODIUM CHLORIDE SCH MLS/HR: 900 INJECTION, SOLUTION INTRAVENOUS at 19:55

## 2021-12-18 RX ADMIN — GABAPENTIN SCH MG: 400 CAPSULE ORAL at 08:45

## 2021-12-18 RX ADMIN — SODIUM CHLORIDE SCH MLS/HR: 900 INJECTION, SOLUTION INTRAVENOUS at 04:49

## 2021-12-18 RX ADMIN — INSULIN ASPART SCH: 100 INJECTION, SOLUTION INTRAVENOUS; SUBCUTANEOUS at 23:36

## 2021-12-18 RX ADMIN — CITALOPRAM HYDROBROMIDE SCH MG: 20 TABLET ORAL at 08:45

## 2021-12-18 RX ADMIN — CEFAZOLIN SCH MLS/HR: 330 INJECTION, POWDER, FOR SOLUTION INTRAMUSCULAR; INTRAVENOUS at 04:18

## 2021-12-18 NOTE — XR
EXAMINATION TYPE: XR chest 1V portable

 

DATE OF EXAM: 12/18/2021

 

COMPARISON: Yesterday

 

HISTORY: Respiratory failure

 

TECHNIQUE: Single view

 

FINDINGS: Endotracheal tube is 6 cm from the [place]. There is soft tissue air on the right chest wall
. There is some patchy bilateral pulmonary interstitial and airspace edema. Trachea is midline. There
 is nasogastric tube in the stomach.

 

IMPRESSION: Patchy pulmonary edema consistent with multifocal pneumonia or RDS which is not changed c
ompared to yesterday. There is new extensive right side soft tissue air compared to yesterday.

## 2021-12-18 NOTE — P.PN
Subjective


Progress Note Date: 12/18/21


Principal diagnosis: 





Respiratory failure.





On 12/12/2021 patient seen in follow-up on medical surgical floor, is currently 

on 15 L of oxygen, he is not using the nonrebreather mask, his pulse ox is 90-

95%, patient has been self repositioning in bed, he states his breathing is abo

ut the same, maybe slightly improved, lung sounds are positive for fine rales 

bilaterally, he remains on Zosyn for empiric antibiotic coverage, Decadron 6 

mggram twice daily, vancomycin has been discontinued.  Patient is afebrile, 

hemodynamically has been stable.  Tolerating oral intake, his labs have been 

reviewed today, his white blood cell count is 7.42, hemoglobin is 13.2, d-dimer 

is 1.4, electrolytes and renal profile are within normal limits, his liver 

enzymes have worsened on today's labs, and AST is up to 108, ALT is 182, 

alkaline phosphatase is actually improved and is down to 150.  His LDH is 

improved and is down to 413, and CRP is 4.1, improved compared to admission 

levels.  Pro calcitonin level is improving and is down to 1.34, urinalysis did 

not show evidence of infection.





On 12/13/2021 patient seen in follow-up on medical surgical floor, he remains on

high flow oxygen at 15 L, and 100% nonrebreather mask, his been pretty 

successful with self repositioning in bed, his pulse ox is around 96% on both 

oxygen delivery devices, at times it is near 99%, patient is mildly short of 

breath with any exertion, she does desaturate with any exertion, into the low 

80s recovers.  Other vitals have been stable, his been afebrile, has cough and 

congestion have improved, history and culture was sent, showing few PMNs and a 

few gram-positive cocci.  Patient remains on empiric antibiotic coverage with 

Zosyn only, vancomycin has been discontinued, blood cultures have shown no 

growth.  Follow-up pro-calcitonin will be obtained for tomorrow, physical exam 

reveals a few scattered rhonchi, improved from yesterday's exam.  No complaint 

of chest pain, no hemoptysis, no nausea vomiting or diarrhea.





On 12/15/2021 patient seen in follow-up on medical surgical floor.  Patient is 

laying on his left side, he remains on interval at 60 L and FiO2 of 90% and 

nonrebreather mask and his pulse ox is 86-93%.  Breathing fairly comfortably, 

although he has been quite limited in terms of activity tolerance.  Afebrile, 

hemodynamically has been stable, he continues on Decadron 6 mg twice daily, he 

continues on Zosyn for empiric antibiotic coverage is on prophylactic Lovenox 40

mg twice daily.  Sputum culture showed Candida albicans, blood cultures have 

been negative.  His pro calcitonin level was improving on previous labs.  No 

acute events overnight, his lower extremity Dopplers were negative for DVT.  His

d-dimer on yesterday's labs was 1.53.  His electrolytes and renal profile were 

unremarkable.





On 12/16/2021 patient seen in follow-up on medical surgical floor, rapid 

response team was called on him this morning, patient was placed on BiPAP 

support at 14 and 8 and FiO2 100%, patient's pulse ox has remained around 83%.  

This morning he was adamant about not being intubated, patient is a COVID-19 

pneumonia patient, she remains on Decadron 6 mg twice daily, Lovenox 40 mg twice

daily, he's been treated with empiric antibiotics for possibility of bacterial 

pneumonia, his pro calcitonin today is improved and is down to 0.17.  His 

inflammatory markers are improving, his LDH is down to 349, and CRP is 4.8.  

However despite that his oxygenation has worsened.  Yesterday CT angiogram was 

ordered however patient could not lie down flat, and the test was not completed.

 He is awake and alert, upon my arrival to the bedside, he is speaking to his 

son on the phone, he has changed his mind about his CODE STATUS, and he is 

agreeable to intubation if need be.  He is dyspneic, slightly tachypneic but 

does not appear to be in distress.  He remains on BiPAP support, and his O2 satu

ration has been around 82%, blood gas was completed showing pO2 of only 49, pCO2

of 34, and pH of 7.45.  Patient was urgently transferred to the intensive care 

unit and intubated on arrival.  He was placed on mechanical ventilator support 

with assist control and a rate of 28, tidal volume was 450, FiO2 100% and PEEP 

of 16.  His d-dimer today is 1.31.  His white count is 9.4, hemoglobin is 12.7. 

His blood cultures from a few days ago have shown no growth, and his sputum 

culture from 12/12/2021 only showed Candida species.  Patient's son was notified

about patient's change in condition, and that he has been moved to the ICU and 

intubated.  His condition is critical.  Follow-up chest x-ray after intubation 

and left subclavian central venous catheter placement showed increased bilateral

multifocal opacities, no pneumothorax, and proper positioning of the left 

subclavian central venous catheter. 





On 12/17/2021 patient seen in follow-up in the intensive care unit, yesterday he

was emergently transferred to the intensive care unit, where he was intubated, 

this morning he remains sedated, intubated and paralyzed on assist-control mode 

of ventilation with a rate at 32, tidal volume is 450, FiO2 of percent and PEEP 

of 20.  This morning's blood gas shows a pO2 of 97, pCO2 of 57, and pH of 7.29. 

He is currently on fentanyl at 1 [place] per kilo per minute, Diprivan and is at 50 

mics per kilo per minute, Nimbex is at 1 [place] per kilo per minute, and 0.9 at 75 

ML per hour.  He is on vital high protein at 30 ML per hour with a goal of 35. 

He has been proned for 16 hours, tolerating perone well so far, his pulse ox is 

around 93%.  He is in sinus mechanism with a rate of 69.  Blood pressure is a 

bit hypertensive, possibly related to discomfort and anxiety, we'll adjust his 

fentanyl infusion drip.  He remains on Decadron 6 mg daily, she remains on 

prophylactic Lovenox 40 mg daily.  Today's labs have been reviewed showing white

blood cell count of 13.1, hemoglobin is 13.1, his d-dimer is 1.17, sodium is 

135, potassium is 5.2, chloride is 104, BUN is 29 creatinine 0.78.  His LDH and 

CRP were improving and yesterday's labs, CRP from today is 7.9, increased, LDH 

is still pending, his pro calcitonin level is 0.15.  No fever overnight.  CT 

angiogram has been completed showing markedly suboptimal study with LSAT of 

central pulmonary embolism, however smaller pulmonary emboli could not be 

excluded.  Lung windows shows bilateral multifocal confluent groundglass 

opacities and organizing consolidation consistent with known COVID 19 infection.

 Sputum culture showed only Candida albicans, blood cultures from 12/12/2021 

showed no growth.Baricitinib was discontinued a few days ago for possibility of 

bacterial infection.  Pro calcitonin level has improved.





Progress note dated 12/18/2021.





56-year-old male, with a history of coronavirus associated pneumonia.  The 

patient developed worsening hypoxemic respiratory failure, and was intubated and

mechanically ventilated for hypoxemia, on 12/17/2021.  The patient is again seen

today in room 266.  He remains on the mechanical ventilator.  Currently, he's on

volume assist control, rate 32, tidal volume 450, FiO2 100%, and PEEP of 20.  

Arterial blood gases show pO2 of 119, pCO2 60, pH is 7.3.  The FiO2 will be 

reduced down to 90%.  The patient's on fentanyl at 3 mcg/kg/h, propofol at 50 

g/kg/m, Nimbex at 1 mcg/kg/m, saline at 75 mL an hour, and vital HP at 20 mL an 

hour, which is goal.  We will continue to try to wean the FiO2.  Readmitted 

Reglan 10 mg every 6 because of high residuals.  The patient's CT angiogram was 

negative for PE, and we will reduce the Decadron to be used once a day as 

opposed to twice a day.  White count 13.4, hemoglobin 12.6, hematocrit 39.8, and

platelet count 312,000.  D-dimer is 1.11.  Sodium 137, potassium 5.1, chlorides 

105, CO2 28, anion gap 4, BUN 29, and creatinine 0.84.  Chest x-ray shows 

bilateral multifocal opacities, which are consistent with coronavirus associated

pneumonia.  Chest x-rays essentially unchanged.





Objective





- Vital Signs


Vital signs: 


                                   Vital Signs











Temp  98.5 F   12/18/21 04:02


 


Pulse  65   12/18/21 07:00


 


Resp  32 H  12/18/21 07:00


 


BP  92/63   12/17/21 21:00


 


Pulse Ox  99   12/18/21 07:00








                                 Intake & Output











 12/17/21 12/18/21 12/18/21





 18:59 06:59 18:59


 


Intake Total 8377.245 3164.841 410.718


 


Output Total 560 625 250


 


Balance 5657.584 1349.841 160.718


 


Weight 123 kg  


 


Intake:   


 


   936 78


 


    Normal Saline 0.9 36 36 3





    Pressure Bag @ 3mL/hr   


 


    Sodium Chloride 0.9% 1, 900 900 75





    000 ml @ 75 mls/hr IV .   





    [id number] [facility] Rx#:[id number]   


 


  Intake, IV Titration 645.821 744.841 312.718





  Amount   


 


    Cisatracurium 200 mg In 55.789  167.145





    Sodium Chloride 0.9% 180   





    ml @ 1 MCG/KG/MIN 6.668   





    mls/hr IV .Q24H [facility] Rx#:   





    [id number]   


 


    fentaNYL (PF). 1,000 mcg 190.032 282.236 71.773





    In Sodium Chloride 0.9%   





    80 ml @ 0.5 MCG/KG/HR 5.   





    557 mls/hr IV .Q18H [facility]   





    Rx#:[id number]   


 


    propofoL 1,000 mg In 400 462.605 73.8





    Empty Bag 1 bag @ Titrate   





    IV .Q0M [facility] Rx#:   





    [id number]   


 


  Tube Feeding 170 240 20


 


  Other 60 60 


 


Output:   


 


  Urine 560 625 250


 


Other:   


 


  Voiding Method Indwelling Catheter Indwelling Catheter 








                       ABP, PAP, CO, CI - Last Documented











Arterial Blood Pressure        159/64

















- Exam





No acute distress, sedated and paralyzed, with an orally placed endotracheal 

tube and NG tube.





HEENT examination is grossly unremarkable.  





Neck supple.  Full range of motion.  No adenopathy thyromegaly or neck vein 

distention.





Cardiovascular examination reveals regular rhythm rate.  S1-S2 normal.  No S3 or

S4.  No discernible murmur noted.  Heart sounds are distant.  Heart rate 65 bpm.





Lungs reveal coarse bilateral rhonchi.  No wheezes or crackles.  Breath sounds 

equal bilaterally.





Abdomen soft bowel sounds are heard.  No masses or tenderness.





Extremities are intact.  No cyanosis clubbing or edema.





Skin is without rash or lesion.





Neurologic examination cannot be adequately assessed as the patient's currently 

sedated and paralyzed.





- Labs


CBC & Chem 7: 


                                 12/18/21 03:45





                                 12/18/21 03:45


Labs: 


                  Abnormal Lab Results - Last 24 Hours (Table)











  12/17/21 12/17/21 12/17/21 Range/Units





  03:25 11:05 17:52 


 


WBC     (3.8-10.6)  k/uL


 


RBC     (4.30-5.90)  m/uL


 


Hgb     (13.0-17.5)  gm/dL


 


Neutrophils #     (1.3-7.7)  k/uL


 


Lymphocytes #     (1.0-4.8)  k/uL


 


D-Dimer     (<0.60)  mg/L FEU


 


ABG pH     (7.35-7.45)  


 


ABG pCO2     (35-45)  mmHg


 


ABG pO2     ()  mmHg


 


ABG HCO3     (21-25)  mmol/L


 


ABG Total CO2     (19-24)  mmol/L


 


ABG O2 Saturation     (94-97)  %


 


BUN     (9-20)  mg/dL


 


Glucose     (74-99)  mg/dL


 


POC Glucose (mg/dL)   116 H  109 H  (75-99)  mg/dL


 


Magnesium     (1.6-2.3)  mg/dL


 


ALT     (4-49)  U/L


 


C-Reactive Protein     (<1.0)  mg/dL


 


Total Protein     (6.3-8.2)  g/dL


 


Albumin     (3.5-5.0)  g/dL


 


Procalcitonin  0.15 H    (0.02-0.09)  ng/mL














  12/17/21 12/18/21 12/18/21 Range/Units





  23:48 03:45 03:45 


 


WBC    13.4 H  (3.8-10.6)  k/uL


 


RBC    4.21 L  (4.30-5.90)  m/uL


 


Hgb    12.6 L  (13.0-17.5)  gm/dL


 


Neutrophils #    12.7 H  (1.3-7.7)  k/uL


 


Lymphocytes #    0.2 L  (1.0-4.8)  k/uL


 


D-Dimer     (<0.60)  mg/L FEU


 


ABG pH     (7.35-7.45)  


 


ABG pCO2     (35-45)  mmHg


 


ABG pO2     ()  mmHg


 


ABG HCO3     (21-25)  mmol/L


 


ABG Total CO2     (19-24)  mmol/L


 


ABG O2 Saturation     (94-97)  %


 


BUN   29 H   (9-20)  mg/dL


 


Glucose   127 H   (74-99)  mg/dL


 


POC Glucose (mg/dL)  124 H    (75-99)  mg/dL


 


Magnesium   2.5 H   (1.6-2.3)  mg/dL


 


ALT   154 H   (4-49)  U/L


 


C-Reactive Protein   8.9 H   (<1.0)  mg/dL


 


Total Protein   5.9 L   (6.3-8.2)  g/dL


 


Albumin   2.7 L   (3.5-5.0)  g/dL


 


Procalcitonin     (0.02-0.09)  ng/mL














  12/18/21 12/18/21 Range/Units





  03:45 06:10 


 


WBC    (3.8-10.6)  k/uL


 


RBC    (4.30-5.90)  m/uL


 


Hgb    (13.0-17.5)  gm/dL


 


Neutrophils #    (1.3-7.7)  k/uL


 


Lymphocytes #    (1.0-4.8)  k/uL


 


D-Dimer  1.11 H   (<0.60)  mg/L FEU


 


ABG pH   7.30 L  (7.35-7.45)  


 


ABG pCO2   60 H  (35-45)  mmHg


 


ABG pO2   119 H  ()  mmHg


 


ABG HCO3   30 H  (21-25)  mmol/L


 


ABG Total CO2   32 H  (19-24)  mmol/L


 


ABG O2 Saturation   98.5 H  (94-97)  %


 


BUN    (9-20)  mg/dL


 


Glucose    (74-99)  mg/dL


 


POC Glucose (mg/dL)    (75-99)  mg/dL


 


Magnesium    (1.6-2.3)  mg/dL


 


ALT    (4-49)  U/L


 


C-Reactive Protein    (<1.0)  mg/dL


 


Total Protein    (6.3-8.2)  g/dL


 


Albumin    (3.5-5.0)  g/dL


 


Procalcitonin    (0.02-0.09)  ng/mL








                      Microbiology - Last 24 Hours (Table)











 12/12/21 12:26 Blood Culture - Preliminary





 Blood    No Growth after 120 hours


 


 12/12/21 12:29 Blood Culture - Preliminary





 Blood    No Growth after 120 hours














Assessment and Plan


Assessment: 





Acute hypoxemic respiratory failure secondary to coronavirus associated 

pneumonia, with intubation and mechanical ventilation on 12/16/2021.





Elevated inflammatory markers secondary to acute coronavirus infection.





Mild transaminitis secondary to coronavirus infection.





No evidence of pulmonary embolism on CT angiogram.





History of fibromyalgia.





History of depression.





Lifelong nontobacco user.





Acute kidney injury.





Hypothyroidism.








Plan: 





Plan dated 12/18/2021 





The patient FiO2 will be weaned down.  We added Reglan 10 mg IV push every 6 

hours for his high gastric residuals.  CT angiogram was negative for pulmonary 

embolism.  The Decadron is reduce down to 6 mg once a day.  The patient remains 

on fentanyl propofol and Nimbex.  The patient is receiving tube feedings at 

goal.  Additional recommendations and suggestions are forthcoming.  Labs, x-

rays, medications are all reviewed.  The patient's overall prognosis is very 

guarded.  Continue to follow make recommendations where appropriate.


Time with Patient: Greater than 30

## 2021-12-18 NOTE — P.PN
Subjective


Progress Note Date: 12/18/21








History of present illness


56-year-old  male with past medical history of fibromyalgia comes in to

emergency room with symptoms of nausea, vomiting, dizziness and sweating feeling

weak in with runny nose loss of taste and hence now for the past 1 week.  He 

presented on 12/7 was found to have positive COVID results.  Received his 

monoclonal antibodies.  He Came back to the emergency room because of worsening 

of symptoms.Vitals reviewed patient afebrile pulse 80 respiratory rate 20 blood 

pressure 138/82 saturating at 88% on 10 L labs reviewed WBC 6.6 hemoglobin 13.9 

d-dimer is elevated at 0.89, sodium 135 bicarb 21 BUN 16 creatinine 1.3 glucose 

187 ferritin 2822 AST 89 ALT 80 alkaline phosphatase 225 LDH 1091 and CRP 21 and

albumin 3.3


X-ray suggestive of bilateral patchy pneumonia.  Both interstitial and airspace 

infiltrate


Pulmonary clinic consulted for COVID pneumonia


Patient placed on Lovenox 40 subcu twice a day, vitamin C, D and zinc.  

Dexamethasone initiated at 6 mg twice a day IV


Patient is a candidate of Baritinib and will discuss about initiating it with 

pulmonary


Venous Doppler ordered





12/10: [facility].  He continues to have 

dyspnea and is on 15 L high flow nasal cannula and nonrebreather with pulse ox 

of 87 and 95%.  He's been afebrile, heart rate in the 60s and 70s, blood 

pressure 127/73.  Pro-calcitonin came back high at 9.75 and we started the 

patient on Zosyn and vancomycin for bacterial pneumonia coverage.  Patient has 

coarse crackles bilaterally.  No lower extremity edema.  He has been seen and 

followed by pulmonary medicine.  Patient is not a candidate for Baricitinib.  

Patient is continued on Decadron, Lovenox and vitamin supplements.





12/11: Is found sitting up in the edge of the bed.  He is currently not on a 

Ventimask.  However his pulse oxing still around 88 to 91 % on Ventimask or 15 L

high flow nasal cannula. Patient continues to have dyspnea with activity and 

speaking. Patient states that he is feeling much better compared to yesterday.  

Continues to have a cough.  No lower extremity edema.  He has rhonchi to the 

bases bilaterally.  He is currently on Decadron Lovenox and vitamin supplements.

 Patient remains afebrile, heart rate 62, respirations 17, blood pressure 118/68

pulse ox 93% on 15 L high flow nasal cannula





12/12: Patient was up to the bathroom showering today.  Awaiting inflammation 

markers today.  Patient still requires 10 L of O2 with a pulse ox of 90%.  

Patient continues to have crackles to the bilateral bases.  Dyspnea with 

activity and speaking.  Patient states he is feeling better however he is very 

tired.  Continues to have cough no lower extremity edema.  He still currently on

Decadron, Lovenox, and vitamin supplements.  Patient remains afebrile, heart 

rate 58, respirations 17, blood pressure 118/89,





12/13: Patient is still on high flow nasal cannula 15 L and nonrebreather with 

pulse ox of 89 and 99%.  He's been afebrile, heart rate 73, blood pressure 

120/72.  Creatinine 1.06.  Blood sugars running between 101 and 106.  Sputum 

culture is in progress.  Patient is followed closely by pulmonary medicine.  

Patient is continued on dexamethasone, Lovenox, Zosyn and vitamin supplements.  

Patient has been encouraged to prone several hours daily.





12/14: Patient remains on nonrebreather mask and AirVo at 60 L, FiO2 of 85 with 

pulse ox of 88%.  Appears to have more difficulty with breathing.  Repeat chest 

x-ray reveals stable bilateral infiltrates.  He has been afebrile, heart rate 

59, blood pressure 102/59.  Repeat d-dimer 1.53.  Blood sugars are well 

controlled.





12/15: Patient remains on nonrebreather and AirVo with pulse ox at 90%.  Patient

was agreeable.  He has been afebrile, heart rate 70, blood pressure 131/79.  CBG

running between 97 and 116 and CBGs and insulin will be discontinued.  Sputum 

cultures positive for Candida albicans.  Patient is increasing his activity, 

currently laying in bed on his side.  Lab work including inflammatory markers 

ordered for tomorrow.





12/16: Patient had a drop in his pulse ox and 78% with nonrebreather and AirVo 

and A-Team was called.  Patient stated that his shortness of breath was better 

than the night before.  Stat chest x-ray was done which revealed bilateral 

multifocal peripheral increased opacities consistent with Covid 19 infection are

redemonstrated.  No significant change.  Patient transitioned to BiPAP with 

pulse ox of 90%.


Patient verbalized that he wanted to be a no CODE STATUS and was encouraged to 

discuss with family.  It was determined the patient will be transferred to the 

intensive care unit most likely be intubated.  Patient was very resistant but 

after long discussion with Dr. Austin wheeler, patient agreed to intubation and

full CODE STATUS at this point.  Repeat blood work reveals WBC 9.4, hemoglobin 

12.7, platelet count 263.  D-dimer 1.31.  Blood sugars





12/17: Patient was transferred into the intensive care unit yesterday and 

subsequently intubated and placed on mechanical ventilation, tidal volume 450, 

FiO2 100, PEEP of 20.  Patient was prone this morning switched over to supine.  

He was started on tube feedings yesterday but had a high residual this morning 

after pronating.  He is currently on fentanyl, propofol, Nimbex drip.  One amp 

of bicarbonate given this morning.


Chest x-ray reveals lateral multifocal confluent and increased opacities 

greatest in the periphery consistent with Covid 19 infection redemonstrated.  No

sicca be unchanged from one day earlier.  


CTA of the chest done yesterday reveals suboptimal study without saddle central 

pulmonary embolism.  Cannot exclude smaller pulmonary emboli.  Bilateral 

multifocal and confluent groundglass opacities and organizing consolidations 

consistent with known Covid 19 infection.


Repeat blood work reveals WBC 13.1, hemoglobin 13.1, platelet count 381.  D-

dimer 1.17.  Sodium 135, potassium 5.2, chloride 104, CO2 24, BUN 29 and 

creatinine 0.78.  Capillary blood glucose running between 116 and 150s.  AST 

216.  C-reactive protein 7.9, pro-calcitonin 0.15.





12/18: This is day #9 of admission, patient remains in ICU, still mechanically 

ventilated for hypoxemia, starting 12/17/2021, on Dickerson assist control, rate 32,

PEEP of 20, FiO2 100%, dexamethasone 6 mg daily, electrolytes are stable, BUN 29

creatinine 0.84, x-ray shows bilateral multifocal opacities, consistent with 

Covid pneumonia, CT angiogram negative for PE, patient is on propofol, fentanyl 

and index vital HP, at 20 mL an hour 


 


ROS


Unable to obtain due to intubation





Physical exam


General Appearance: Patient is laying in the ICU bed, intubated and on me

chanical ventilation.  No acute distress.  Patient appears to be comfortable. 


Full examination deferred to intensive this due to intubation and Covid 19.





Assessment and plan


1. Acute hypoxic respiratory failure secondary to Covid 19 pneumonia.  Consult 

with pulmonary medicine appreciated.  Patient was intubated and placed on 

mechanical ventilation 12/16.  Continue care in the intensive care unit, 

proning, currently on fentanyl, propofol and Nimbex.





2. Acute COVID pneumonia. Dexamethasone 6 mg IV  once a day,  Lovenox 40 subcu 

daily, Not a candidate for Baricitinib, Monitor inflammatory markers. Continue 

supplements vitamin C, D and zinc





3. Acute transaminitis Secondary to viral inflammation. Continue monitoring





4. CKD stage 3.  Continue to monitor patient's creatinine.  No acute kidney 

injury





5. History of fibromyalgia. continue gabapentin 800 3 times a day, Citalopram 40

mg by mouth daily





6. Insomnia. Was on Eszopiclone 3 mg daily at bedtime 





7. Hypothyroidism. Levothyroxine 50 g by mouth daily





8. DVT prophylaxis. Lovenox 40 subcu daily.





9. GI prophylaxis.  Protonix 40 mg IV push daily





10.  Hyperglycemia secondary to steroids, IV drips, patient is on NovoLog scale.





Prognosis guarded.





Status: Full code





Discharge Plan: 


To be determined.








Objective





- Vital Signs


Vital signs: 


                                   Vital Signs











Temp  98.2 F   12/18/21 16:00


 


Pulse  64   12/18/21 17:00


 


Resp  32 H  12/18/21 17:00


 


BP  92/63   12/17/21 21:00


 


Pulse Ox  97   12/18/21 17:00








                                 Intake & Output











 12/17/21 12/18/21 12/18/21





 18:59 06:59 18:59


 


Intake Total 6902.049 7001.841 2064.358


 


Output Total 560 625 950


 


Balance 5862.070 0373.841 1114.358


 


Weight 123 kg  


 


Intake:   


 


   936 858


 


    Normal Saline 0.9 36 36 33





    Pressure Bag @ 3mL/hr   


 


    Sodium Chloride 0.9% 1, 900 900 825





    000 ml @ 75 mls/hr IV .   





    [id number] [facility] Rx#:[id number]   


 


  Intake, IV Titration 645.821 744.841 896.358





  Amount   


 


    Cisatracurium 200 mg In 55.789  167.145





    Sodium Chloride 0.9% 180   





    ml @ 1 MCG/KG/MIN 6.668   





    mls/hr IV .Q24H [facility] Rx#:   





    [id number]   


 


    fentaNYL (PF). 1,000 mcg 190.032 282.236 371.773





    In Sodium Chloride 0.9%   





    80 ml @ 0.5 MCG/KG/HR 5.   





    557 mls/hr IV .Q18H [facility]   





    Rx#:[id number]   


 


    propofoL 1,000 mg In 400 462.605 357.44





    Empty Bag 1 bag @ Titrate   





    IV .Q0M [facility] Rx#:   





    [id number]   


 


  Tube Feeding 170 240 220


 


  Other 60 60 90


 


Output:   


 


  Urine 560 625 950


 


Other:   


 


  Voiding Method Indwelling Catheter Indwelling Catheter Indwelling Catheter








                       ABP, PAP, CO, CI - Last Documented











Arterial Blood Pressure        129/71

















- Labs


CBC & Chem 7: 


                                 12/18/21 03:45





                                 12/18/21 03:45


Labs: 


                  Abnormal Lab Results - Last 24 Hours (Table)











  12/17/21 12/18/21 12/18/21 Range/Units





  23:48 03:45 03:45 


 


WBC    13.4 H  (3.8-10.6)  k/uL


 


RBC    4.21 L  (4.30-5.90)  m/uL


 


Hgb    12.6 L  (13.0-17.5)  gm/dL


 


Neutrophils #    12.7 H  (1.3-7.7)  k/uL


 


Lymphocytes #    0.2 L  (1.0-4.8)  k/uL


 


D-Dimer     (<0.60)  mg/L FEU


 


ABG pH     (7.35-7.45)  


 


ABG pCO2     (35-45)  mmHg


 


ABG pO2     ()  mmHg


 


ABG HCO3     (21-25)  mmol/L


 


ABG Total CO2     (19-24)  mmol/L


 


ABG O2 Saturation     (94-97)  %


 


BUN   29 H   (9-20)  mg/dL


 


Glucose   127 H   (74-99)  mg/dL


 


POC Glucose (mg/dL)  124 H    (75-99)  mg/dL


 


Magnesium   2.5 H   (1.6-2.3)  mg/dL


 


ALT   154 H   (4-49)  U/L


 


C-Reactive Protein   8.9 H   (<1.0)  mg/dL


 


Total Protein   5.9 L   (6.3-8.2)  g/dL


 


Albumin   2.7 L   (3.5-5.0)  g/dL














  12/18/21 12/18/21 12/18/21 Range/Units





  03:45 06:10 11:26 


 


WBC     (3.8-10.6)  k/uL


 


RBC     (4.30-5.90)  m/uL


 


Hgb     (13.0-17.5)  gm/dL


 


Neutrophils #     (1.3-7.7)  k/uL


 


Lymphocytes #     (1.0-4.8)  k/uL


 


D-Dimer  1.11 H    (<0.60)  mg/L FEU


 


ABG pH   7.30 L   (7.35-7.45)  


 


ABG pCO2   60 H   (35-45)  mmHg


 


ABG pO2   119 H   ()  mmHg


 


ABG HCO3   30 H   (21-25)  mmol/L


 


ABG Total CO2   32 H   (19-24)  mmol/L


 


ABG O2 Saturation   98.5 H   (94-97)  %


 


BUN     (9-20)  mg/dL


 


Glucose     (74-99)  mg/dL


 


POC Glucose (mg/dL)    123 H  (75-99)  mg/dL


 


Magnesium     (1.6-2.3)  mg/dL


 


ALT     (4-49)  U/L


 


C-Reactive Protein     (<1.0)  mg/dL


 


Total Protein     (6.3-8.2)  g/dL


 


Albumin     (3.5-5.0)  g/dL














  12/18/21 Range/Units





  17:25 


 


WBC   (3.8-10.6)  k/uL


 


RBC   (4.30-5.90)  m/uL


 


Hgb   (13.0-17.5)  gm/dL


 


Neutrophils #   (1.3-7.7)  k/uL


 


Lymphocytes #   (1.0-4.8)  k/uL


 


D-Dimer   (<0.60)  mg/L FEU


 


ABG pH   (7.35-7.45)  


 


ABG pCO2   (35-45)  mmHg


 


ABG pO2   ()  mmHg


 


ABG HCO3   (21-25)  mmol/L


 


ABG Total CO2   (19-24)  mmol/L


 


ABG O2 Saturation   (94-97)  %


 


BUN   (9-20)  mg/dL


 


Glucose   (74-99)  mg/dL


 


POC Glucose (mg/dL)  120 H  (75-99)  mg/dL


 


Magnesium   (1.6-2.3)  mg/dL


 


ALT   (4-49)  U/L


 


C-Reactive Protein   (<1.0)  mg/dL


 


Total Protein   (6.3-8.2)  g/dL


 


Albumin   (3.5-5.0)  g/dL








                      Microbiology - Last 24 Hours (Table)











 12/12/21 12:26 Blood Culture - Final





 Blood    No Growth after 144 hours


 


 12/12/21 12:29 Blood Culture - Final





 Blood    No Growth after 144 hours

## 2021-12-19 LAB
ALBUMIN SERPL-MCNC: 2.3 G/DL (ref 3.5–5)
ALP SERPL-CCNC: 83 U/L (ref 38–126)
ALT SERPL-CCNC: 104 U/L (ref 4–49)
ANION GAP SERPL CALC-SCNC: 3 MMOL/L
AST SERPL-CCNC: 45 U/L (ref 17–59)
BASOPHILS # BLD AUTO: 0 K/UL (ref 0–0.2)
BASOPHILS NFR BLD AUTO: 0 %
BUN SERPL-SCNC: 33 MG/DL (ref 9–20)
CALCIUM SPEC-MCNC: 7.9 MG/DL (ref 8.4–10.2)
CHLORIDE SERPL-SCNC: 107 MMOL/L (ref 98–107)
CO2 BLDA-SCNC: 32 MMOL/L (ref 19–24)
CO2 SERPL-SCNC: 27 MMOL/L (ref 22–30)
EOSINOPHIL # BLD AUTO: 0.1 K/UL (ref 0–0.7)
EOSINOPHIL NFR BLD AUTO: 1 %
ERYTHROCYTE [DISTWIDTH] IN BLOOD BY AUTOMATED COUNT: 3.96 M/UL (ref 4.3–5.9)
ERYTHROCYTE [DISTWIDTH] IN BLOOD: 14 % (ref 11.5–15.5)
GLUCOSE BLD-MCNC: 101 MG/DL (ref 75–99)
GLUCOSE BLD-MCNC: 108 MG/DL (ref 75–99)
GLUCOSE BLD-MCNC: 128 MG/DL (ref 75–99)
GLUCOSE SERPL-MCNC: 91 MG/DL (ref 74–99)
HCO3 BLDA-SCNC: 31 MMOL/L (ref 21–25)
HCT VFR BLD AUTO: 38 % (ref 39–53)
HGB BLD-MCNC: 12.1 GM/DL (ref 13–17.5)
LYMPHOCYTES # SPEC AUTO: 0.5 K/UL (ref 1–4.8)
LYMPHOCYTES NFR SPEC AUTO: 4 %
MAGNESIUM SPEC-SCNC: 2.4 MG/DL (ref 1.6–2.3)
MCH RBC QN AUTO: 30.5 PG (ref 25–35)
MCHC RBC AUTO-ENTMCNC: 31.9 G/DL (ref 31–37)
MCV RBC AUTO: 95.8 FL (ref 80–100)
MONOCYTES # BLD AUTO: 0.4 K/UL (ref 0–1)
MONOCYTES NFR BLD AUTO: 3 %
NEUTROPHILS # BLD AUTO: 11.1 K/UL (ref 1.3–7.7)
NEUTROPHILS NFR BLD AUTO: 91 %
PCO2 BLDA: 53 MMHG (ref 35–45)
PH BLDA: 7.37 [PH] (ref 7.35–7.45)
PLATELET # BLD AUTO: 308 K/UL (ref 150–450)
PO2 BLDA: 89 MMHG (ref 83–108)
POTASSIUM SERPL-SCNC: 4.6 MMOL/L (ref 3.5–5.1)
PROT SERPL-MCNC: 5.1 G/DL (ref 6.3–8.2)
SODIUM SERPL-SCNC: 137 MMOL/L (ref 137–145)
WBC # BLD AUTO: 12.2 K/UL (ref 3.8–10.6)

## 2021-12-19 RX ADMIN — GABAPENTIN SCH MG: 400 CAPSULE ORAL at 20:27

## 2021-12-19 RX ADMIN — INSULIN ASPART SCH: 100 INJECTION, SOLUTION INTRAVENOUS; SUBCUTANEOUS at 23:44

## 2021-12-19 RX ADMIN — PANTOPRAZOLE SODIUM SCH MG: 40 INJECTION, POWDER, FOR SOLUTION INTRAVENOUS at 08:12

## 2021-12-19 RX ADMIN — ALBUTEROL SULFATE SCH PUFF: 90 AEROSOL, METERED RESPIRATORY (INHALATION) at 07:36

## 2021-12-19 RX ADMIN — INSULIN ASPART SCH: 100 INJECTION, SOLUTION INTRAVENOUS; SUBCUTANEOUS at 11:38

## 2021-12-19 RX ADMIN — DEXTRAN 70 AND HYPROMELLOSE 2910 SCH DROPS: 1; 3 SOLUTION/ DROPS OPHTHALMIC at 08:12

## 2021-12-19 RX ADMIN — SODIUM CHLORIDE SCH MLS/HR: 900 INJECTION, SOLUTION INTRAVENOUS at 18:10

## 2021-12-19 RX ADMIN — SODIUM CHLORIDE SCH MLS/HR: 900 INJECTION, SOLUTION INTRAVENOUS at 02:09

## 2021-12-19 RX ADMIN — DEXTRAN 70 AND HYPROMELLOSE 2910 SCH DROPS: 1; 3 SOLUTION/ DROPS OPHTHALMIC at 11:30

## 2021-12-19 RX ADMIN — DEXTROSE SCH MG: 50 INJECTION, SOLUTION INTRAVENOUS at 08:13

## 2021-12-19 RX ADMIN — CEFAZOLIN SCH MLS/HR: 330 INJECTION, POWDER, FOR SOLUTION INTRAMUSCULAR; INTRAVENOUS at 20:27

## 2021-12-19 RX ADMIN — LEVOTHYROXINE SODIUM SCH MCG: 50 TABLET ORAL at 06:05

## 2021-12-19 RX ADMIN — CHLORHEXIDINE GLUCONATE SCH ML: 1.2 RINSE ORAL at 08:12

## 2021-12-19 RX ADMIN — ENOXAPARIN SODIUM SCH MG: 40 INJECTION SUBCUTANEOUS at 08:12

## 2021-12-19 RX ADMIN — ALBUTEROL SULFATE SCH PUFF: 90 AEROSOL, METERED RESPIRATORY (INHALATION) at 11:28

## 2021-12-19 RX ADMIN — CEFAZOLIN SCH MLS/HR: 330 INJECTION, POWDER, FOR SOLUTION INTRAMUSCULAR; INTRAVENOUS at 06:47

## 2021-12-19 RX ADMIN — INSULIN ASPART SCH: 100 INJECTION, SOLUTION INTRAVENOUS; SUBCUTANEOUS at 06:05

## 2021-12-19 RX ADMIN — METOCLOPRAMIDE SCH MG: 5 INJECTION, SOLUTION INTRAMUSCULAR; INTRAVENOUS at 06:06

## 2021-12-19 RX ADMIN — INSULIN ASPART SCH: 100 INJECTION, SOLUTION INTRAVENOUS; SUBCUTANEOUS at 18:11

## 2021-12-19 RX ADMIN — CISATRACURIUM BESYLATE SCH MLS/HR: 10 INJECTION INTRAVENOUS at 14:31

## 2021-12-19 RX ADMIN — Medication SCH MCG: at 08:12

## 2021-12-19 RX ADMIN — GABAPENTIN SCH MG: 400 CAPSULE ORAL at 08:12

## 2021-12-19 RX ADMIN — CITALOPRAM HYDROBROMIDE SCH MG: 20 TABLET ORAL at 08:12

## 2021-12-19 RX ADMIN — DEXTRAN 70 AND HYPROMELLOSE 2910 SCH: 1; 3 SOLUTION/ DROPS OPHTHALMIC at 04:52

## 2021-12-19 RX ADMIN — METOCLOPRAMIDE SCH MG: 5 INJECTION, SOLUTION INTRAMUSCULAR; INTRAVENOUS at 23:48

## 2021-12-19 RX ADMIN — SODIUM CHLORIDE SCH MLS/HR: 900 INJECTION, SOLUTION INTRAVENOUS at 06:06

## 2021-12-19 RX ADMIN — DEXTRAN 70 AND HYPROMELLOSE 2910 SCH DROPS: 1; 3 SOLUTION/ DROPS OPHTHALMIC at 23:49

## 2021-12-19 RX ADMIN — METOCLOPRAMIDE SCH MG: 5 INJECTION, SOLUTION INTRAMUSCULAR; INTRAVENOUS at 11:30

## 2021-12-19 RX ADMIN — METOCLOPRAMIDE SCH MG: 5 INJECTION, SOLUTION INTRAMUSCULAR; INTRAVENOUS at 16:51

## 2021-12-19 RX ADMIN — SODIUM CHLORIDE SCH MLS/HR: 900 INJECTION, SOLUTION INTRAVENOUS at 15:28

## 2021-12-19 RX ADMIN — ALBUTEROL SULFATE SCH PUFF: 90 AEROSOL, METERED RESPIRATORY (INHALATION) at 19:41

## 2021-12-19 RX ADMIN — SODIUM CHLORIDE SCH MLS/HR: 900 INJECTION, SOLUTION INTRAVENOUS at 09:14

## 2021-12-19 RX ADMIN — DEXTRAN 70 AND HYPROMELLOSE 2910 SCH DROPS: 1; 3 SOLUTION/ DROPS OPHTHALMIC at 20:27

## 2021-12-19 RX ADMIN — SODIUM CHLORIDE SCH MLS/HR: 900 INJECTION, SOLUTION INTRAVENOUS at 22:24

## 2021-12-19 RX ADMIN — GABAPENTIN SCH MG: 400 CAPSULE ORAL at 16:51

## 2021-12-19 RX ADMIN — Medication SCH MG: at 08:13

## 2021-12-19 RX ADMIN — OXYCODONE HYDROCHLORIDE AND ACETAMINOPHEN SCH MG: 500 TABLET ORAL at 08:12

## 2021-12-19 RX ADMIN — CHLORHEXIDINE GLUCONATE SCH ML: 1.2 RINSE ORAL at 20:27

## 2021-12-19 RX ADMIN — SODIUM CHLORIDE SCH MLS/HR: 900 INJECTION, SOLUTION INTRAVENOUS at 12:17

## 2021-12-19 RX ADMIN — DEXTRAN 70 AND HYPROMELLOSE 2910 SCH DROPS: 1; 3 SOLUTION/ DROPS OPHTHALMIC at 15:28

## 2021-12-19 NOTE — P.PN
Subjective


Progress Note Date: 12/19/21








History of present illness


56-year-old  male with past medical history of fibromyalgia comes in to

emergency room with symptoms of nausea, vomiting, dizziness and sweating feeling

weak in with runny nose loss of taste and hence now for the past 1 week.  He 

presented on 12/7 was found to have positive COVID results.  Received his 

monoclonal antibodies.  He Came back to the emergency room because of worsening 

of symptoms.Vitals reviewed patient afebrile pulse 80 respiratory rate 20 blood 

pressure 138/82 saturating at 88% on 10 L labs reviewed WBC 6.6 hemoglobin 13.9 

d-dimer is elevated at 0.89, sodium 135 bicarb 21 BUN 16 creatinine 1.3 glucose 

187 ferritin 2822 AST 89 ALT 80 alkaline phosphatase 225 LDH 1091 and CRP 21 and

albumin 3.3


X-ray suggestive of bilateral patchy pneumonia.  Both interstitial and airspace 

infiltrate


Pulmonary clinic consulted for COVID pneumonia


Patient placed on Lovenox 40 subcu twice a day, vitamin C, D and zinc.  

Dexamethasone initiated at 6 mg twice a day IV


Patient is a candidate of Baritinib and will discuss about initiating it with 

pulmonary


Venous Doppler ordered





12/10: [facility].  He continues to have 

dyspnea and is on 15 L high flow nasal cannula and nonrebreather with pulse ox 

of 87 and 95%.  He's been afebrile, heart rate in the 60s and 70s, blood 

pressure 127/73.  Pro-calcitonin came back high at 9.75 and we started the 

patient on Zosyn and vancomycin for bacterial pneumonia coverage.  Patient has 

coarse crackles bilaterally.  No lower extremity edema.  He has been seen and 

followed by pulmonary medicine.  Patient is not a candidate for Baricitinib.  

Patient is continued on Decadron, Lovenox and vitamin supplements.





12/11: Is found sitting up in the edge of the bed.  He is currently not on a 

Ventimask.  However his pulse oxing still around 88 to 91 % on Ventimask or 15 L

high flow nasal cannula. Patient continues to have dyspnea with activity and 

speaking. Patient states that he is feeling much better compared to yesterday.  

Continues to have a cough.  No lower extremity edema.  He has rhonchi to the 

bases bilaterally.  He is currently on Decadron Lovenox and vitamin supplements.

 Patient remains afebrile, heart rate 62, respirations 17, blood pressure 118/68

pulse ox 93% on 15 L high flow nasal cannula





12/12: Patient was up to the bathroom showering today.  Awaiting inflammation 

markers today.  Patient still requires 10 L of O2 with a pulse ox of 90%.  

Patient continues to have crackles to the bilateral bases.  Dyspnea with 

activity and speaking.  Patient states he is feeling better however he is very 

tired.  Continues to have cough no lower extremity edema.  He still currently on

Decadron, Lovenox, and vitamin supplements.  Patient remains afebrile, heart 

rate 58, respirations 17, blood pressure 118/89,





12/13: Patient is still on high flow nasal cannula 15 L and nonrebreather with 

pulse ox of 89 and 99%.  He's been afebrile, heart rate 73, blood pressure 

120/72.  Creatinine 1.06.  Blood sugars running between 101 and 106.  Sputum 

culture is in progress.  Patient is followed closely by pulmonary medicine.  

Patient is continued on dexamethasone, Lovenox, Zosyn and vitamin supplements.  

Patient has been encouraged to prone several hours daily.





12/14: Patient remains on nonrebreather mask and AirVo at 60 L, FiO2 of 85 with 

pulse ox of 88%.  Appears to have more difficulty with breathing.  Repeat chest 

x-ray reveals stable bilateral infiltrates.  He has been afebrile, heart rate 

59, blood pressure 102/59.  Repeat d-dimer 1.53.  Blood sugars are well 

controlled.





12/15: Patient remains on nonrebreather and AirVo with pulse ox at 90%.  Patient

was agreeable.  He has been afebrile, heart rate 70, blood pressure 131/79.  CBG

running between 97 and 116 and CBGs and insulin will be discontinued.  Sputum 

cultures positive for Candida albicans.  Patient is increasing his activity, 

currently laying in bed on his side.  Lab work including inflammatory markers 

ordered for tomorrow.





12/16: Patient had a drop in his pulse ox and 78% with nonrebreather and AirVo 

and A-Team was called.  Patient stated that his shortness of breath was better 

than the night before.  Stat chest x-ray was done which revealed bilateral 

multifocal peripheral increased opacities consistent with Covid 19 infection are

redemonstrated.  No significant change.  Patient transitioned to BiPAP with 

pulse ox of 90%.


Patient verbalized that he wanted to be a no CODE STATUS and was encouraged to 

discuss with family.  It was determined the patient will be transferred to the 

intensive care unit most likely be intubated.  Patient was very resistant but 

after long discussion with Dr. Austin wheeler, patient agreed to intubation and

full CODE STATUS at this point.  Repeat blood work reveals WBC 9.4, hemoglobin 

12.7, platelet count 263.  D-dimer 1.31.  Blood sugars





12/17: Patient was transferred into the intensive care unit yesterday and 

subsequently intubated and placed on mechanical ventilation, tidal volume 450, 

FiO2 100, PEEP of 20.  Patient was prone this morning switched over to supine.  

He was started on tube feedings yesterday but had a high residual this morning 

after pronating.  He is currently on fentanyl, propofol, Nimbex drip.  One amp 

of bicarbonate given this morning.


Chest x-ray reveals lateral multifocal confluent and increased opacities 

greatest in the periphery consistent with Covid 19 infection redemonstrated.  No

sicca be unchanged from one day earlier.  


CTA of the chest done yesterday reveals suboptimal study without saddle central 

pulmonary embolism.  Cannot exclude smaller pulmonary emboli.  Bilateral 

multifocal and confluent groundglass opacities and organizing consolidations 

consistent with known Covid 19 infection.


Repeat blood work reveals WBC 13.1, hemoglobin 13.1, platelet count 381.  D-

dimer 1.17.  Sodium 135, potassium 5.2, chloride 104, CO2 24, BUN 29 and 

creatinine 0.78.  Capillary blood glucose running between 116 and 150s.  AST 

216.  C-reactive protein 7.9, pro-calcitonin 0.15.





12/18: This is day #9 of admission, patient remains in ICU, still mechanically 

ventilated for hypoxemia, starting 12/17/2021, on Dickerson assist control, rate 32,

PEEP of 20, FiO2 100%, dexamethasone 6 mg daily, electrolytes are stable, BUN 29

creatinine 0.84, x-ray shows bilateral multifocal opacities, consistent with 

Covid pneumonia, CT angiogram negative for PE, patient is on propofol, fentanyl 

and index vital HP, at 20 mL an hour 





12/19: Patient remains in ICU, intubated, NG tube in place, chest x-ray shows no

pneumothorax, no pleural effusion, there is bilateral patchy pulmonary 

interstitial infiltrates, with coalescent density in the left lower lobe.  No 

significant change from yesterdayALT iscreatinine is 0.75, ALT is trending 

downward, C reactive protein is 8.0 from a previous of 8.9Lasix given today, on 

dexamethasone, and  remains sedated.








 


ROS


Unable to obtain due to intubation





Physical exam


General Appearance: Patient is laying in the ICU bed, intubated and on 

mechanical ventilation.  No acute distress.  Patient appears to be comfortable. 


Full examination deferred to intensive this due to intubation and Covid 19.





Assessment and plan


1. Acute hypoxic respiratory failure secondary to Covid 19 pneumonia.  Consult 

with pulmonary medicine appreciated.  Patient was intubated and placed on 

mechanical ventilation 12/16.  Continue care in the intensive care unit, 

proning, currently on fentanyl, propofol and Nimbex.





2. Acute COVID pneumonia. Dexamethasone 6 mg IV  once a day,  Lovenox 40 subcu 

daily, Not a candidate for Baricitinib, Monitor inflammatory markers. Continue 

supplements vitamin C, D and zinc





3. Acute transaminitis Secondary to viral inflammation. Continue monitoring





4. CKD stage 3.  Continue to monitor patient's creatinine.  No acute kidney 

injury





5. History of fibromyalgia. continue gabapentin 800 3 times a day, Citalopram 40

mg by mouth daily





6. Insomnia. Was on Eszopiclone 3 mg daily at bedtime 





7. Hypothyroidism. Levothyroxine 50 g by mouth daily





8. DVT prophylaxis. Lovenox 40 subcu daily.





9. GI prophylaxis.  Protonix 40 mg IV push daily





10.  Hyperglycemia secondary to steroids, IV drips, patient is on NovoLog scale.





Prognosis guarded.





Status: Full code





Discharge Plan: 


To be determined.


                               Laboratory Results











WBC  12.2 k/uL (3.8-10.6)  H  12/19/21  04:25    


 


RBC  3.96 m/uL (4.30-5.90)  L  12/19/21  04:25    


 


Hgb  12.1 gm/dL (13.0-17.5)  L  12/19/21  04:25    


 


Hct  38.0 % (39.0-53.0)  L  12/19/21  04:25    


 


MCV  95.8 fL (80.0-100.0)   12/19/21  04:25    


 


MCH  30.5 pg (25.0-35.0)   12/19/21  04:25    


 


MCHC  31.9 g/dL (31.0-37.0)   12/19/21  04:25    


 


RDW  14.0 % (11.5-15.5)   12/19/21  04:25    


 


Plt Count  308 k/uL (150-450)   12/19/21  04:25    


 


MPV  9.2   12/19/21  04:25    


 


Immature Gran % (Auto)  1.4 %  12/16/21  05:24    


 


Absolute Nucleated RBC  0 X 10*3/uL (0.00-0.00)   12/16/21  05:24    


 


Neutrophils %  91 %  12/19/21  04:25    


 


Lymphocytes %  4 %  12/19/21  04:25    


 


Monocytes %  3 %  12/19/21  04:25    


 


Eosinophils %  1 %  12/19/21  04:25    


 


Basophils %  0 %  12/19/21  04:25    


 


Immature Gran #  0.13 X 10*3/uL (0.00-0.04)  H  12/16/21  05:24    


 


Neutrophils #  11.1 k/uL (1.3-7.7)  H  12/19/21  04:25    


 


Lymphocytes #  0.5 k/uL (1.0-4.8)  L  12/19/21  04:25    


 


Monocytes #  0.4 k/uL (0-1.0)   12/19/21  04:25    


 


Eosinophils #  0.1 k/uL (0-0.7)   12/19/21  04:25    


 


Basophils #  0.0 k/uL (0-0.2)   12/19/21  04:25    


 


NRBC/100 WBC Diff  0 /100 WBCS (0.0-0.0)   12/16/21  05:24    


 


D-Dimer  1.38 mg/L FEU (<0.60)  H  12/19/21  04:25    


 


Sample Site  a-line   12/19/21  05:13    


 


ABG pH  7.37  (7.35-7.45)   12/19/21  05:13    


 


ABG pCO2  53 mmHg (35-45)  H  12/19/21  05:13    


 


ABG pO2  89 mmHg ()   12/19/21  05:13    


 


ABG HCO3  31 mmol/L (21-25)  H  12/19/21  05:13    


 


ABG Total CO2  32 mmol/L (19-24)  H  12/19/21  05:13    


 


ABG O2 Saturation  97.7 % (94-97)  H  12/19/21  05:13    


 


ABG Base Excess  5.4 mmol/L  12/19/21  05:13    


 


Austin Test  Yes   12/19/21  05:13    


 


FiO2  85 %  12/19/21  05:13    


 


Sodium  137 mmol/L (137-145)   12/19/21  04:25    


 


Potassium  4.6 mmol/L (3.5-5.1)   12/19/21  04:25    


 


Chloride  107 mmol/L ()   12/19/21  04:25    


 


Carbon Dioxide  27 mmol/L (22-30)   12/19/21  04:25    


 


Anion Gap  3 mmol/L  12/19/21  04:25    


 


BUN  33 mg/dL (9-20)  H  12/19/21  04:25    


 


Creatinine  0.75 mg/dL (0.66-1.25)   12/19/21  04:25    


 


Est GFR (CKD-EPI)AfAm  >90  (>60 ml/min/1.73 sqM)   12/19/21  04:25    


 


Est GFR (CKD-EPI)NonAf  >90  (>60 ml/min/1.73 sqM)   12/19/21  04:25    


 


BUN/Creatinine Ratio  29.89 Ratio (12.00-20.00)  H  12/16/21  05:24    


 


Glucose  91 mg/dL (74-99)   12/19/21  04:25    


 


POC Glucose (mg/dL)  108 mg/dL (75-99)  H  12/19/21  11:30    


 


POC Glu Operater Latrice Gaona   12/19/21  11:30    


 


Estimated Ave Glu mg/dL  117   12/08/21  23:57    


 


Hemoglobin A1c  5.7 % (4.0-6.0)   12/08/21  23:57    


 


Calcium  7.9 mg/dL (8.4-10.2)  L  12/19/21  04:25    


 


Magnesium  2.4 mg/dL (1.6-2.3)  H  12/19/21  04:25    


 


Ferritin  2822.0 ng/mL (22.0-322.0)  H  12/08/21  23:57    


 


Total Bilirubin  0.7 mg/dL (0.2-1.3)   12/19/21  04:25    


 


AST  45 U/L (17-59)   12/19/21  04:25    


 


ALT  104 U/L (4-49)  H  12/19/21  04:25    


 


Alkaline Phosphatase  83 U/L ()   12/19/21  04:25    


 


Lactate Dehydrogenase  349 U/L (120-246)  H  12/16/21  05:24    


 


C-Reactive Protein  8.0 mg/dL (<1.0)  H  12/19/21  04:25    


 


NT-Pro-B Natriuret Pep  400 pg/mL  12/09/21  17:19    


 


Total Protein  5.1 g/dL (6.3-8.2)  L  12/19/21  04:25    


 


Albumin  2.3 g/dL (3.5-5.0)  L  12/19/21  04:25    


 


Globulin  2.9 g/dL (1.6-3.3)   12/16/21  05:24    


 


Albumin/Globulin Ratio  1.03 g/dL (1.60-3.17)  L  12/16/21  05:24    


 


Procalcitonin  0.15 ng/mL (0.02-0.09)  H  12/17/21  03:25    


 


Urine Color  Yellow   12/11/21  02:28    


 


Urine Appearance  Clear  (Clear)   12/11/21  02:28    


 


Urine pH  6.0  (5.0-8.0)   12/11/21  02:28    


 


Ur Specific Gravity  1.032  (1.001-1.035)   12/11/21  02:28    


 


Urine Protein  Trace  (Negative)  H  12/11/21  02:28    


 


Urine Glucose (UA)  Negative  (Negative)   12/11/21  02:28    


 


Urine Ketones  Negative  (Negative)   12/11/21  02:28    


 


Urine Blood  Negative  (Negative)   12/11/21  02:28    


 


Urine Nitrite  Negative  (Negative)   12/11/21  02:28    


 


Urine Bilirubin  Negative  (Negative)   12/11/21  02:28    


 


Urine Urobilinogen  <2.0 mg/dL (<2.0)   12/11/21  02:28    


 


Ur Leukocyte Esterase  Negative  (Negative)   12/11/21  02:28    








                                   Vital Signs











Temp  98.1 F   12/19/21 16:00


 


Pulse  56 L  12/19/21 16:00


 


Resp  32 H  12/19/21 16:00


 


BP  92/63   12/17/21 21:00


 


Pulse Ox  95   12/19/21 16:00








                                 Intake & Output











 12/18/21 12/19/21 12/19/21





 18:59 06:59 18:59


 


Intake Total 2162.358 2059.357 1377.500


 


Output Total 6148 411 2828


 


Balance 4352.974 0947.357 -172.500


 


Intake:   


 


   936 560


 


    Normal Saline 0.9 36 36 30





    Pressure Bag @ 3mL/hr   


 


    Sodium Chloride 0.9% 1, 900 900 530





    000 ml @ 20 mls/hr IV .   





    Q24H [facility] Rx#:[id number]   


 


  Intake, IV Titration 896.358 793.357 527.500





  Amount   


 


    Cisatracurium 200 mg In 167.145  198.595





    Sodium Chloride 0.9% 180   





    ml @ 1 MCG/KG/MIN 6.668   





    mls/hr IV .Q24H [facility] Rx#:   





    [id number]   


 


    fentaNYL (PF). 1,000 mcg 371.773 395.572 300





    In Sodium Chloride 0.9%   





    80 ml @ 0.5 MCG/KG/HR 5.   





    557 mls/hr IV .Q18H [facility]   





    Rx#:[id number]   


 


    propofoL 1,000 mg In 357.44 397.785 28.905





    Empty Bag 1 bag @ Titrate   





    IV .Q0M [facility] Rx#:   





    [id number]   


 


  Tube Feeding 240 240 200


 


  Other 90 90 90


 


Output:   


 


  Urine 5882 085 6601


 


Other:   


 


  Voiding Method Indwelling Catheter Indwelling Catheter Indwelling Catheter








                       ABP, PAP, CO, CI - Last Documented











Arterial Blood Pressure        95/56











                               Current Medications





Albuterol Sulfate (Albuterol Hfa Inhaler)  2 puff INHALATION RT-TID [facility]


   Last Admin: 12/19/21 11:28 Dose:  2 puff


   Documented by: 


Artificial Tears (Artificial Tears-Hypromellose Drops 15 Ml Btl)  1 drops BOTH 

EYES Q4H [facility]


   Last Admin: 12/19/21 15:28 Dose:  1 drops


   Documented by: 


Ascorbic Acid (Ascorbic Acid 500 Mg Tab)  1,000 mg PO DAILY [facility]


   Last Admin: 12/19/21 08:12 Dose:  1,000 mg


   Documented by: 


Chlorhexidine Gluconate (Chlorhexidine Gluconate 15 Ml Cup)  15 ml MUCOUS MEM 

BID [facility]


   Last Admin: 12/19/21 08:12 Dose:  15 ml


   Documented by: 


Cholecalciferol (Cholecalciferol 25 Mcg (1000 Iu) Tablet)  25 mcg PO DAILY [facility]


   Last Admin: 12/19/21 08:12 Dose:  25 mcg


   Documented by: 


Citalopram Hydrobromide (Citalopram Hydrobromide 20 Mg Tab)  40 mg PO DAILY [facility]


   Last Admin: 12/19/21 08:12 Dose:  40 mg


   Documented by: 


Dexamethasone Sodium Phosphate (Dexamethasone Sod Phosphate 10 Mg/Ml 1 Ml Vial) 

6 mg IVP DAILY [facility]


   Last Admin: 12/19/21 08:13 Dose:  6 mg


   Documented by: 


Enoxaparin Sodium (Enoxaparin 40 Mg/0.4 Ml Syringe)  40 mg SQ DAILY [facility]


   Last Admin: 12/19/21 08:12 Dose:  40 mg


   Documented by: 


Gabapentin (Gabapentin 400 Mg Cap)  800 mg PO TID [facility]


   Last Admin: 12/19/21 16:51 Dose:  800 mg


   Documented by: 


Propofol 1,000 mg/ IV Solution  100 mls @ 0 mls/hr IV .Q0M [facility]; Protocol


   Last Admin: 12/19/21 08:12 Dose:  50 mcg/kg/min, 36.9 mls/hr


   Documented by: 


Fentanyl Citrate 1,000 mcg/ (Sodium Chloride)  100 mls @ 5.557 mls/hr IV .Q18H 

[facility]; Protocol


   Last Admin: 12/19/21 15:28 Dose:  3 mcg/kg/hr, 33.339 mls/hr


   Documented by: 


Cisatracurium Besylate 200 mg/ (Sodium Chloride)  200 mls @ 6.668 mls/hr IV 

.Q24H [facility]; Protocol


   Last Admin: 12/19/21 14:31 Dose:  1 mcg/kg/min, 6.668 mls/hr


   Documented by: 


Sodium Chloride (Saline 0.9%)  1,000 mls @ 20 mls/hr IV .Q24H [facility]


   Last Admin: 12/19/21 06:47 Dose:  75 mls/hr


   Documented by: 


Insulin Aspart (Insulin Aspart (Novolog) 100 Unit/Ml Vial)  0 unit SQ Q6H [facility]; 

Protocol


   Last Admin: 12/19/21 11:38 Dose:  Not Given


   Documented by: 


Levothyroxine Sodium (Levothyroxine 50 Mcg Tab)  50 mcg PO 0630 [facility]


   Last Admin: 12/19/21 06:05 Dose:  50 mcg


   Documented by: 


Metoclopramide HCl (Metoclopramide 5 Mg/Ml 2 Ml Vial)  10 mg IVP Q6HR [facility]


   Last Admin: 12/19/21 16:51 Dose:  10 mg


   Documented by: 


Pantoprazole Sodium (Pantoprazole 40 Mg/10 Ml Vial)  40 mg IVP DAILY [facility]


   Last Admin: 12/19/21 08:12 Dose:  40 mg


   Documented by: 


Zinc Sulfate (Zinc Sulfate 220 Mg Cap)  220 mg PO DAILY [facility]


   Last Admin: 12/19/21 08:13 Dose:  220 mg


   Documented by: 











Objective





- Vital Signs


Vital signs: 


                                   Vital Signs











Temp  98.1 F   12/19/21 16:00


 


Pulse  56 L  12/19/21 16:00


 


Resp  32 H  12/19/21 16:00


 


BP  92/63   12/17/21 21:00


 


Pulse Ox  95   12/19/21 16:00








                                 Intake & Output











 12/18/21 12/19/21 12/19/21





 18:59 06:59 18:59


 


Intake Total 2162.358 2059.357 1377.500


 


Output Total 9652 764 4994


 


Balance 4954.542 0965.357 -172.500


 


Intake:   


 


   936 560


 


    Normal Saline 0.9 36 36 30





    Pressure Bag @ 3mL/hr   


 


    Sodium Chloride 0.9% 1, 900 900 530





    000 ml @ 20 mls/hr IV .   





    Q24H [facility] Rx#:[id number]   


 


  Intake, IV Titration 896.358 793.357 527.500





  Amount   


 


    Cisatracurium 200 mg In 167.145  198.595





    Sodium Chloride 0.9% 180   





    ml @ 1 MCG/KG/MIN 6.668   





    mls/hr IV .Q24H [facility] Rx#:   





    [id number]   


 


    fentaNYL (PF). 1,000 mcg 371.773 395.572 300





    In Sodium Chloride 0.9%   





    80 ml @ 0.5 MCG/KG/HR 5.   





    557 mls/hr IV .Q18H [facility]   





    Rx#:[id number]   


 


    propofoL 1,000 mg In 357.44 397.785 28.905





    Empty Bag 1 bag @ Titrate   





    IV .Q0M [facility] Rx#:   





    [id number]   


 


  Tube Feeding 240 240 200


 


  Other 90 90 90


 


Output:   


 


  Urine 5029 682 2806


 


Other:   


 


  Voiding Method Indwelling Catheter Indwelling Catheter Indwelling Catheter








                       ABP, PAP, CO, CI - Last Documented











Arterial Blood Pressure        95/56

















- Labs


CBC & Chem 7: 


                                 12/19/21 04:25





                                 12/19/21 04:25


Labs: 


                  Abnormal Lab Results - Last 24 Hours (Table)











  12/18/21 12/19/21 12/19/21 Range/Units





  17:25 04:25 04:25 


 


WBC   12.2 H   (3.8-10.6)  k/uL


 


RBC   3.96 L   (4.30-5.90)  m/uL


 


Hgb   12.1 L   (13.0-17.5)  gm/dL


 


Hct   38.0 L   (39.0-53.0)  %


 


Neutrophils #   11.1 H   (1.3-7.7)  k/uL


 


Lymphocytes #   0.5 L   (1.0-4.8)  k/uL


 


D-Dimer     (<0.60)  mg/L FEU


 


ABG pCO2     (35-45)  mmHg


 


ABG HCO3     (21-25)  mmol/L


 


ABG Total CO2     (19-24)  mmol/L


 


ABG O2 Saturation     (94-97)  %


 


BUN    33 H  (9-20)  mg/dL


 


POC Glucose (mg/dL)  120 H    (75-99)  mg/dL


 


Calcium    7.9 L  (8.4-10.2)  mg/dL


 


Magnesium    2.4 H  (1.6-2.3)  mg/dL


 


ALT    104 H  (4-49)  U/L


 


C-Reactive Protein    8.0 H  (<1.0)  mg/dL


 


Total Protein    5.1 L  (6.3-8.2)  g/dL


 


Albumin    2.3 L  (3.5-5.0)  g/dL














  12/19/21 12/19/21 12/19/21 Range/Units





  04:25 05:13 11:30 


 


WBC     (3.8-10.6)  k/uL


 


RBC     (4.30-5.90)  m/uL


 


Hgb     (13.0-17.5)  gm/dL


 


Hct     (39.0-53.0)  %


 


Neutrophils #     (1.3-7.7)  k/uL


 


Lymphocytes #     (1.0-4.8)  k/uL


 


D-Dimer  1.38 H    (<0.60)  mg/L FEU


 


ABG pCO2   53 H   (35-45)  mmHg


 


ABG HCO3   31 H   (21-25)  mmol/L


 


ABG Total CO2   32 H   (19-24)  mmol/L


 


ABG O2 Saturation   97.7 H   (94-97)  %


 


BUN     (9-20)  mg/dL


 


POC Glucose (mg/dL)    108 H  (75-99)  mg/dL


 


Calcium     (8.4-10.2)  mg/dL


 


Magnesium     (1.6-2.3)  mg/dL


 


ALT     (4-49)  U/L


 


C-Reactive Protein     (<1.0)  mg/dL


 


Total Protein     (6.3-8.2)  g/dL


 


Albumin     (3.5-5.0)  g/dL








                      Microbiology - Last 24 Hours (Table)











 12/12/21 12:26 Blood Culture - Final





 Blood    No Growth after 144 hours


 


 12/12/21 12:29 Blood Culture - Final





 Blood    No Growth after 144 hours

## 2021-12-19 NOTE — XR
EXAMINATION TYPE: XR chest 1V

 

DATE OF EXAM: 12/19/2021

 

COMPARISON: Yesterday

 

HISTORY: Tube placement

 

TECHNIQUE:

 

FINDINGS: Heart is normal. There is patchy bilateral pulmonary predominantly interstitial infiltrates
. There is some coalescent density left lower lobe. There are chest leads. There is endotracheal tube
 6.5 cm from the corby. There is nasogastric tube in the stomach. There is no definite pleural effus
ion. There is soft tissue air on the right chest wall. No pneumothorax.

 

IMPRESSION: There is improvement in the right side soft tissue air. There is bilateral patchy interst
itial and airspace pulmonary infiltrates not significantly different than yesterday. Normal heart siz
e.

## 2021-12-19 NOTE — P.PN
Subjective


Progress Note Date: 12/19/21


Principal diagnosis: 





Respiratory failure.





On 12/12/2021 patient seen in follow-up on medical surgical floor, is currently 

on 15 L of oxygen, he is not using the nonrebreather mask, his pulse ox is 90-

95%, patient has been self repositioning in bed, he states his breathing is abo

ut the same, maybe slightly improved, lung sounds are positive for fine rales 

bilaterally, he remains on Zosyn for empiric antibiotic coverage, Decadron 6 

mggram twice daily, vancomycin has been discontinued.  Patient is afebrile, 

hemodynamically has been stable.  Tolerating oral intake, his labs have been 

reviewed today, his white blood cell count is 7.42, hemoglobin is 13.2, d-dimer 

is 1.4, electrolytes and renal profile are within normal limits, his liver 

enzymes have worsened on today's labs, and AST is up to 108, ALT is 182, 

alkaline phosphatase is actually improved and is down to 150.  His LDH is 

improved and is down to 413, and CRP is 4.1, improved compared to admission 

levels.  Pro calcitonin level is improving and is down to 1.34, urinalysis did 

not show evidence of infection.





On 12/13/2021 patient seen in follow-up on medical surgical floor, he remains on

high flow oxygen at 15 L, and 100% nonrebreather mask, his been pretty 

successful with self repositioning in bed, his pulse ox is around 96% on both 

oxygen delivery devices, at times it is near 99%, patient is mildly short of 

breath with any exertion, she does desaturate with any exertion, into the low 

80s recovers.  Other vitals have been stable, his been afebrile, has cough and 

congestion have improved, history and culture was sent, showing few PMNs and a 

few gram-positive cocci.  Patient remains on empiric antibiotic coverage with 

Zosyn only, vancomycin has been discontinued, blood cultures have shown no 

growth.  Follow-up pro-calcitonin will be obtained for tomorrow, physical exam 

reveals a few scattered rhonchi, improved from yesterday's exam.  No complaint 

of chest pain, no hemoptysis, no nausea vomiting or diarrhea.





On 12/15/2021 patient seen in follow-up on medical surgical floor.  Patient is 

laying on his left side, he remains on interval at 60 L and FiO2 of 90% and 

nonrebreather mask and his pulse ox is 86-93%.  Breathing fairly comfortably, 

although he has been quite limited in terms of activity tolerance.  Afebrile, 

hemodynamically has been stable, he continues on Decadron 6 mg twice daily, he 

continues on Zosyn for empiric antibiotic coverage is on prophylactic Lovenox 40

mg twice daily.  Sputum culture showed Candida albicans, blood cultures have 

been negative.  His pro calcitonin level was improving on previous labs.  No 

acute events overnight, his lower extremity Dopplers were negative for DVT.  His

d-dimer on yesterday's labs was 1.53.  His electrolytes and renal profile were 

unremarkable.





On 12/16/2021 patient seen in follow-up on medical surgical floor, rapid 

response team was called on him this morning, patient was placed on BiPAP 

support at 14 and 8 and FiO2 100%, patient's pulse ox has remained around 83%.  

This morning he was adamant about not being intubated, patient is a COVID-19 

pneumonia patient, she remains on Decadron 6 mg twice daily, Lovenox 40 mg twice

daily, he's been treated with empiric antibiotics for possibility of bacterial 

pneumonia, his pro calcitonin today is improved and is down to 0.17.  His 

inflammatory markers are improving, his LDH is down to 349, and CRP is 4.8.  

However despite that his oxygenation has worsened.  Yesterday CT angiogram was 

ordered however patient could not lie down flat, and the test was not completed.

 He is awake and alert, upon my arrival to the bedside, he is speaking to his 

son on the phone, he has changed his mind about his CODE STATUS, and he is 

agreeable to intubation if need be.  He is dyspneic, slightly tachypneic but 

does not appear to be in distress.  He remains on BiPAP support, and his O2 satu

ration has been around 82%, blood gas was completed showing pO2 of only 49, pCO2

of 34, and pH of 7.45.  Patient was urgently transferred to the intensive care 

unit and intubated on arrival.  He was placed on mechanical ventilator support 

with assist control and a rate of 28, tidal volume was 450, FiO2 100% and PEEP 

of 16.  His d-dimer today is 1.31.  His white count is 9.4, hemoglobin is 12.7. 

His blood cultures from a few days ago have shown no growth, and his sputum 

culture from 12/12/2021 only showed Candida species.  Patient's son was notified

about patient's change in condition, and that he has been moved to the ICU and 

intubated.  His condition is critical.  Follow-up chest x-ray after intubation 

and left subclavian central venous catheter placement showed increased bilateral

multifocal opacities, no pneumothorax, and proper positioning of the left 

subclavian central venous catheter. 





On 12/17/2021 patient seen in follow-up in the intensive care unit, yesterday he

was emergently transferred to the intensive care unit, where he was intubated, 

this morning he remains sedated, intubated and paralyzed on assist-control mode 

of ventilation with a rate at 32, tidal volume is 450, FiO2 of percent and PEEP 

of 20.  This morning's blood gas shows a pO2 of 97, pCO2 of 57, and pH of 7.29. 

He is currently on fentanyl at 1 [place] per kilo per minute, Diprivan and is at 50 

mics per kilo per minute, Nimbex is at 1 [place] per kilo per minute, and 0.9 at 75 

ML per hour.  He is on vital high protein at 30 ML per hour with a goal of 35. 

He has been proned for 16 hours, tolerating perone well so far, his pulse ox is 

around 93%.  He is in sinus mechanism with a rate of 69.  Blood pressure is a 

bit hypertensive, possibly related to discomfort and anxiety, we'll adjust his 

fentanyl infusion drip.  He remains on Decadron 6 mg daily, she remains on 

prophylactic Lovenox 40 mg daily.  Today's labs have been reviewed showing white

blood cell count of 13.1, hemoglobin is 13.1, his d-dimer is 1.17, sodium is 

135, potassium is 5.2, chloride is 104, BUN is 29 creatinine 0.78.  His LDH and 

CRP were improving and yesterday's labs, CRP from today is 7.9, increased, LDH 

is still pending, his pro calcitonin level is 0.15.  No fever overnight.  CT 

angiogram has been completed showing markedly suboptimal study with LSAT of 

central pulmonary embolism, however smaller pulmonary emboli could not be 

excluded.  Lung windows shows bilateral multifocal confluent groundglass 

opacities and organizing consolidation consistent with known COVID 19 infection.

 Sputum culture showed only Candida albicans, blood cultures from 12/12/2021 

showed no growth.Baricitinib was discontinued a few days ago for possibility of 

bacterial infection.  Pro calcitonin level has improved.





Progress note dated 12/18/2021.





56-year-old male, with a history of coronavirus associated pneumonia.  The 

patient developed worsening hypoxemic respiratory failure, and was intubated and

mechanically ventilated for hypoxemia, on 12/17/2021.  The patient is again seen

today in room 266.  He remains on the mechanical ventilator.  Currently, he's on

volume assist control, rate 32, tidal volume 450, FiO2 100%, and PEEP of 20.  

Arterial blood gases show pO2 of 119, pCO2 60, pH is 7.3.  The FiO2 will be 

reduced down to 90%.  The patient's on fentanyl at 3 mcg/kg/h, propofol at 50 

g/kg/m, Nimbex at 1 mcg/kg/m, saline at 75 mL an hour, and vital HP at 20 mL an 

hour, which is goal.  We will continue to try to wean the FiO2.  Readmitted 

Reglan 10 mg every 6 because of high residuals.  The patient's CT angiogram was 

negative for PE, and we will reduce the Decadron to be used once a day as 

opposed to twice a day.  White count 13.4, hemoglobin 12.6, hematocrit 39.8, and

platelet count 312,000.  D-dimer is 1.11.  Sodium 137, potassium 5.1, chlorides 

105, CO2 28, anion gap 4, BUN 29, and creatinine 0.84.  Chest x-ray shows 

bilateral multifocal opacities, which are consistent with coronavirus associated

pneumonia.  Chest x-rays essentially unchanged.





Progress note dated 12/19/2021.





56-year-old male, again seen in room 266.  He has a history of coronavirus 

associated pneumonia.  The patient developed respiratory failure, and was 

intubated and ventilated, on December 17.  He remains on the mechanical 

ventilator.  He is on the volume assist control mode, rate 32, tidal [place] 450, 

FiO2 80%, PEEP of 20.  Blood gases show pO2 of 89, pCO2 53, and pH of 7.37.  The

patient remains on fentanyl at 2 mcg/kg/h, propofol at 50 mcg/kg/m, Nimbex at 1 

mcg/kg/m, saline at 75 mL an hour, and vital high protein at 20 mL an hour, 

which is goal.  The plan for today is to give the patient Lasix 40 mg IV push, 

the patient's IV fluids, and wean the FiO2.  White count 12.2, hemoglobin 12.1, 

hematocrit 38, and platelet count normal.  D-dimer is 1.38.  Sodium potassium 

chloride CO2 all normal.  Anion gap 3, BUN 33, and creatinine 0.75.  Albumin is 

2.3.  Microbiology assess far negative.  Chest x-ray shows diffuse patchy 

bilateral infiltrates consistent with coronavirus associated pneumonia.  The 

chest x-ray is essentially unchanged.





Objective





- Vital Signs


Vital signs: 


                                   Vital Signs











Temp  98.2 F   12/19/21 08:00


 


Pulse  59 L  12/19/21 11:00


 


Resp  18   12/19/21 11:00


 


BP  92/63   12/17/21 21:00


 


Pulse Ox  93 L  12/19/21 11:00








                                 Intake & Output











 12/18/21 12/19/21 12/19/21





 18:59 06:59 18:59


 


Intake Total 2162.358 2059.357 648.905


 


Output Total 1050 675 350


 


Balance 8374.744 5962.357 298.905


 


Intake:   


 


   936 390


 


    Normal Saline 0.9 36 36 15





    Pressure Bag @ 3mL/hr   


 


    Sodium Chloride 0.9% 1, 900 900 375





    000 ml @ 75 mls/hr IV .   





    [id number] [facility] Rx#:[id number]   


 


  Intake, IV Titration 896.358 793.357 128.905





  Amount   


 


    Cisatracurium 200 mg In 167.145  





    Sodium Chloride 0.9% 180   





    ml @ 1 MCG/KG/MIN 6.668   





    mls/hr IV .Q24H [facility] Rx#:   





    [id number]   


 


    fentaNYL (PF). 1,000 mcg 371.773 395.572 100





    In Sodium Chloride 0.9%   





    80 ml @ 0.5 MCG/KG/HR 5.   





    557 mls/hr IV .Q18H [facility]   





    Rx#:[id number]   


 


    propofoL 1,000 mg In 357.44 397.785 28.905





    Empty Bag 1 bag @ Titrate   





    IV .Q0M [facility] Rx#:   





    [id number]   


 


  Tube Feeding 240 240 100


 


  Other 90 90 30


 


Output:   


 


  Urine 1050 675 350


 


Other:   


 


  Voiding Method Indwelling Catheter Indwelling Catheter Indwelling Catheter








                       ABP, PAP, CO, CI - Last Documented











Arterial Blood Pressure        138/59

















- Exam





No acute distress, sedated and paralyzed, with an orally placed endotracheal 

tube and NG tube.





HEENT examination is grossly unremarkable.  





Neck supple.  Full range of motion.  No adenopathy thyromegaly or neck vein 

distention.





Cardiovascular examination reveals regular rhythm rate.  S1-S2 normal.  No S3 or

S4.  No discernible murmur noted.  Heart sounds are distant.  Heart rate 59 bpm.





Lungs reveal coarse bilateral rhonchi.  No wheezes or crackles.  Breath sounds 

equal bilaterally.  Saturations are 93%.





Abdomen soft bowel sounds are heard.  No masses or tenderness.





Extremities are intact.  No cyanosis clubbing or edema.





Skin is without rash or lesion.





Neurologic examination cannot be adequately assessed as the patient's currently 

sedated and paralyzed.





- Labs


CBC & Chem 7: 


                                 12/19/21 04:25





                                 12/19/21 04:25


Labs: 


                  Abnormal Lab Results - Last 24 Hours (Table)











  12/18/21 12/19/21 12/19/21 Range/Units





  17:25 04:25 04:25 


 


WBC   12.2 H   (3.8-10.6)  k/uL


 


RBC   3.96 L   (4.30-5.90)  m/uL


 


Hgb   12.1 L   (13.0-17.5)  gm/dL


 


Hct   38.0 L   (39.0-53.0)  %


 


Neutrophils #   11.1 H   (1.3-7.7)  k/uL


 


Lymphocytes #   0.5 L   (1.0-4.8)  k/uL


 


D-Dimer     (<0.60)  mg/L FEU


 


ABG pCO2     (35-45)  mmHg


 


ABG HCO3     (21-25)  mmol/L


 


ABG Total CO2     (19-24)  mmol/L


 


ABG O2 Saturation     (94-97)  %


 


BUN    33 H  (9-20)  mg/dL


 


POC Glucose (mg/dL)  120 H    (75-99)  mg/dL


 


Calcium    7.9 L  (8.4-10.2)  mg/dL


 


Magnesium    2.4 H  (1.6-2.3)  mg/dL


 


ALT    104 H  (4-49)  U/L


 


C-Reactive Protein    8.0 H  (<1.0)  mg/dL


 


Total Protein    5.1 L  (6.3-8.2)  g/dL


 


Albumin    2.3 L  (3.5-5.0)  g/dL














  12/19/21 12/19/21 12/19/21 Range/Units





  04:25 05:13 11:30 


 


WBC     (3.8-10.6)  k/uL


 


RBC     (4.30-5.90)  m/uL


 


Hgb     (13.0-17.5)  gm/dL


 


Hct     (39.0-53.0)  %


 


Neutrophils #     (1.3-7.7)  k/uL


 


Lymphocytes #     (1.0-4.8)  k/uL


 


D-Dimer  1.38 H    (<0.60)  mg/L FEU


 


ABG pCO2   53 H   (35-45)  mmHg


 


ABG HCO3   31 H   (21-25)  mmol/L


 


ABG Total CO2   32 H   (19-24)  mmol/L


 


ABG O2 Saturation   97.7 H   (94-97)  %


 


BUN     (9-20)  mg/dL


 


POC Glucose (mg/dL)    108 H  (75-99)  mg/dL


 


Calcium     (8.4-10.2)  mg/dL


 


Magnesium     (1.6-2.3)  mg/dL


 


ALT     (4-49)  U/L


 


C-Reactive Protein     (<1.0)  mg/dL


 


Total Protein     (6.3-8.2)  g/dL


 


Albumin     (3.5-5.0)  g/dL








                      Microbiology - Last 24 Hours (Table)











 12/12/21 12:26 Blood Culture - Final





 Blood    No Growth after 144 hours


 


 12/12/21 12:29 Blood Culture - Final





 Blood    No Growth after 144 hours














Assessment and Plan


Assessment: 





Acute hypoxemic respiratory failure secondary to coronavirus associated 

pneumonia, with intubation and mechanical ventilation on 12/16/2021.





Elevated inflammatory markers secondary to acute coronavirus infection.





Mild transaminitis secondary to coronavirus infection.





No evidence of pulmonary embolism on CT angiogram.





History of fibromyalgia.





History of depression.





Lifelong nontobacco user.





Acute kidney injury.





Hypothyroidism.








Plan: 





Plan dated 12/18/2021 





The patient FiO2 will be weaned down.  We added Reglan 10 mg IV push every 6 

hours for his high gastric residuals.  CT angiogram was negative for pulmonary 

embolism.  The Decadron is reduce down to 6 mg once a day.  The patient remains 

on fentanyl propofol and Nimbex.  The patient is receiving tube feedings at 

goal.  Additional recommendations and suggestions are forthcoming.  Labs, x-

rays, medications are all reviewed.  The patient's overall prognosis is very 

guarded.  Continue to follow make recommendations where appropriate.





Plan dated 12/19/2021.





The patient will have his IV reduce down to KVO.  The patient will get Lasix 40 

mg IV push once.  In addition, we will attempt to wean the FiO2.  Saturations a

re perfectly okay in the 88-92% range.  We added Reglan because of high gastric 

residuals.  CT angiogram was negative for pulmonary embolism.  The patient 

remains on Decadron, Lovenox, and vitamins.  Additional recommendations and 

suggestions are forthcoming.  Prognosis is guarded.  We'll continue to follow 

the patient make recommendations where appropriate.


Time with Patient: Greater than 30

## 2021-12-20 LAB
ALBUMIN SERPL-MCNC: 2.4 G/DL (ref 3.5–5)
ALP SERPL-CCNC: 96 U/L (ref 38–126)
ALT SERPL-CCNC: 100 U/L (ref 4–49)
ANION GAP SERPL CALC-SCNC: 5 MMOL/L
AST SERPL-CCNC: 46 U/L (ref 17–59)
BASOPHILS # BLD AUTO: 0 K/UL (ref 0–0.2)
BASOPHILS NFR BLD AUTO: 0 %
BUN SERPL-SCNC: 38 MG/DL (ref 9–20)
CALCIUM SPEC-MCNC: 8 MG/DL (ref 8.4–10.2)
CHLORIDE SERPL-SCNC: 105 MMOL/L (ref 98–107)
CO2 BLDA-SCNC: 34 MMOL/L (ref 19–24)
CO2 BLDA-SCNC: 34 MMOL/L (ref 19–24)
CO2 SERPL-SCNC: 27 MMOL/L (ref 22–30)
EOSINOPHIL # BLD AUTO: 0.1 K/UL (ref 0–0.7)
EOSINOPHIL NFR BLD AUTO: 1 %
ERYTHROCYTE [DISTWIDTH] IN BLOOD BY AUTOMATED COUNT: 4.27 M/UL (ref 4.3–5.9)
ERYTHROCYTE [DISTWIDTH] IN BLOOD: 13.4 % (ref 11.5–15.5)
GLUCOSE BLD-MCNC: 117 MG/DL (ref 75–99)
GLUCOSE BLD-MCNC: 134 MG/DL (ref 75–99)
GLUCOSE BLD-MCNC: 175 MG/DL (ref 75–99)
GLUCOSE SERPL-MCNC: 117 MG/DL (ref 74–99)
HCO3 BLDA-SCNC: 32 MMOL/L (ref 21–25)
HCO3 BLDA-SCNC: 32 MMOL/L (ref 21–25)
HCT VFR BLD AUTO: 40.1 % (ref 39–53)
HGB BLD-MCNC: 12.8 GM/DL (ref 13–17.5)
LYMPHOCYTES # SPEC AUTO: 0.5 K/UL (ref 1–4.8)
LYMPHOCYTES NFR SPEC AUTO: 4 %
MCH RBC QN AUTO: 30 PG (ref 25–35)
MCHC RBC AUTO-ENTMCNC: 32 G/DL (ref 31–37)
MCV RBC AUTO: 93.8 FL (ref 80–100)
MONOCYTES # BLD AUTO: 0.4 K/UL (ref 0–1)
MONOCYTES NFR BLD AUTO: 3 %
NEUTROPHILS # BLD AUTO: 12.8 K/UL (ref 1.3–7.7)
NEUTROPHILS NFR BLD AUTO: 92 %
PCO2 BLDA: 50 MMHG (ref 35–45)
PCO2 BLDA: 60 MMHG (ref 35–45)
PH BLDA: 7.34 [PH] (ref 7.35–7.45)
PH BLDA: 7.42 [PH] (ref 7.35–7.45)
PLATELET # BLD AUTO: 345 K/UL (ref 150–450)
PO2 BLDA: 55 MMHG (ref 83–108)
PO2 BLDA: 64 MMHG (ref 83–108)
POTASSIUM SERPL-SCNC: 4.5 MMOL/L (ref 3.5–5.1)
PROT SERPL-MCNC: 5.5 G/DL (ref 6.3–8.2)
SODIUM SERPL-SCNC: 137 MMOL/L (ref 137–145)
WBC # BLD AUTO: 13.9 K/UL (ref 3.8–10.6)

## 2021-12-20 RX ADMIN — OXYCODONE HYDROCHLORIDE AND ACETAMINOPHEN SCH MG: 500 TABLET ORAL at 08:48

## 2021-12-20 RX ADMIN — FUROSEMIDE SCH MG: 10 INJECTION, SOLUTION INTRAMUSCULAR; INTRAVENOUS at 10:12

## 2021-12-20 RX ADMIN — GABAPENTIN SCH MG: 400 CAPSULE ORAL at 08:48

## 2021-12-20 RX ADMIN — ALBUTEROL SULFATE SCH PUFF: 90 AEROSOL, METERED RESPIRATORY (INHALATION) at 07:38

## 2021-12-20 RX ADMIN — LEVOTHYROXINE SODIUM SCH MCG: 50 TABLET ORAL at 05:37

## 2021-12-20 RX ADMIN — Medication SCH MCG: at 08:48

## 2021-12-20 RX ADMIN — ALBUTEROL SULFATE SCH PUFF: 90 AEROSOL, METERED RESPIRATORY (INHALATION) at 17:08

## 2021-12-20 RX ADMIN — DEXTRAN 70 AND HYPROMELLOSE 2910 SCH DROPS: 1; 3 SOLUTION/ DROPS OPHTHALMIC at 11:20

## 2021-12-20 RX ADMIN — INSULIN ASPART SCH UNIT: 100 INJECTION, SOLUTION INTRAVENOUS; SUBCUTANEOUS at 18:11

## 2021-12-20 RX ADMIN — INSULIN ASPART SCH: 100 INJECTION, SOLUTION INTRAVENOUS; SUBCUTANEOUS at 05:39

## 2021-12-20 RX ADMIN — METOCLOPRAMIDE SCH MG: 5 INJECTION, SOLUTION INTRAMUSCULAR; INTRAVENOUS at 05:37

## 2021-12-20 RX ADMIN — SODIUM CHLORIDE SCH MLS/HR: 900 INJECTION, SOLUTION INTRAVENOUS at 06:49

## 2021-12-20 RX ADMIN — CHLORHEXIDINE GLUCONATE SCH ML: 1.2 RINSE ORAL at 08:50

## 2021-12-20 RX ADMIN — CLEVIPIDINE SCH MLS/HR: 0.5 EMULSION INTRAVENOUS at 10:12

## 2021-12-20 RX ADMIN — SODIUM CHLORIDE SCH MLS/HR: 900 INJECTION, SOLUTION INTRAVENOUS at 09:14

## 2021-12-20 RX ADMIN — PANTOPRAZOLE SODIUM SCH MG: 40 INJECTION, POWDER, FOR SOLUTION INTRAVENOUS at 08:48

## 2021-12-20 RX ADMIN — SODIUM CHLORIDE SCH MLS/HR: 900 INJECTION, SOLUTION INTRAVENOUS at 18:08

## 2021-12-20 RX ADMIN — CISATRACURIUM BESYLATE SCH MLS/HR: 10 INJECTION INTRAVENOUS at 13:59

## 2021-12-20 RX ADMIN — CITALOPRAM HYDROBROMIDE SCH MG: 20 TABLET ORAL at 08:48

## 2021-12-20 RX ADMIN — DEXTRAN 70 AND HYPROMELLOSE 2910 SCH DROPS: 1; 3 SOLUTION/ DROPS OPHTHALMIC at 20:51

## 2021-12-20 RX ADMIN — GABAPENTIN SCH MG: 400 CAPSULE ORAL at 16:39

## 2021-12-20 RX ADMIN — ENOXAPARIN SODIUM SCH MG: 40 INJECTION SUBCUTANEOUS at 08:48

## 2021-12-20 RX ADMIN — Medication SCH MG: at 08:48

## 2021-12-20 RX ADMIN — DEXTRAN 70 AND HYPROMELLOSE 2910 SCH: 1; 3 SOLUTION/ DROPS OPHTHALMIC at 04:02

## 2021-12-20 RX ADMIN — LACTULOSE SCH GM: 20 SOLUTION ORAL at 10:14

## 2021-12-20 RX ADMIN — DEXTROSE SCH MG: 50 INJECTION, SOLUTION INTRAVENOUS at 08:48

## 2021-12-20 RX ADMIN — SODIUM CHLORIDE SCH MLS/HR: 900 INJECTION, SOLUTION INTRAVENOUS at 00:40

## 2021-12-20 RX ADMIN — SODIUM CHLORIDE SCH MLS/HR: 900 INJECTION, SOLUTION INTRAVENOUS at 03:59

## 2021-12-20 RX ADMIN — CHLORHEXIDINE GLUCONATE SCH ML: 1.2 RINSE ORAL at 20:50

## 2021-12-20 RX ADMIN — ALBUTEROL SULFATE SCH PUFF: 90 AEROSOL, METERED RESPIRATORY (INHALATION) at 11:39

## 2021-12-20 RX ADMIN — SODIUM CHLORIDE SCH MLS/HR: 900 INJECTION, SOLUTION INTRAVENOUS at 20:51

## 2021-12-20 RX ADMIN — SODIUM CHLORIDE SCH MLS/HR: 900 INJECTION, SOLUTION INTRAVENOUS at 21:40

## 2021-12-20 RX ADMIN — DEXTRAN 70 AND HYPROMELLOSE 2910 SCH DROPS: 1; 3 SOLUTION/ DROPS OPHTHALMIC at 16:38

## 2021-12-20 RX ADMIN — CLEVIPIDINE SCH MLS/HR: 0.5 EMULSION INTRAVENOUS at 13:57

## 2021-12-20 RX ADMIN — SODIUM CHLORIDE SCH MLS/HR: 900 INJECTION, SOLUTION INTRAVENOUS at 15:06

## 2021-12-20 RX ADMIN — GABAPENTIN SCH MG: 400 CAPSULE ORAL at 20:51

## 2021-12-20 RX ADMIN — METOCLOPRAMIDE SCH MG: 5 INJECTION, SOLUTION INTRAMUSCULAR; INTRAVENOUS at 18:10

## 2021-12-20 RX ADMIN — SODIUM CHLORIDE SCH MLS/HR: 900 INJECTION, SOLUTION INTRAVENOUS at 11:51

## 2021-12-20 RX ADMIN — METOCLOPRAMIDE SCH MG: 5 INJECTION, SOLUTION INTRAMUSCULAR; INTRAVENOUS at 11:48

## 2021-12-20 RX ADMIN — CEFAZOLIN SCH MLS/HR: 330 INJECTION, POWDER, FOR SOLUTION INTRAMUSCULAR; INTRAVENOUS at 20:51

## 2021-12-20 RX ADMIN — FUROSEMIDE SCH MG: 10 INJECTION, SOLUTION INTRAMUSCULAR; INTRAVENOUS at 20:51

## 2021-12-20 RX ADMIN — INSULIN ASPART SCH UNIT: 100 INJECTION, SOLUTION INTRAVENOUS; SUBCUTANEOUS at 11:47

## 2021-12-20 RX ADMIN — DEXTRAN 70 AND HYPROMELLOSE 2910 SCH DROPS: 1; 3 SOLUTION/ DROPS OPHTHALMIC at 08:49

## 2021-12-20 NOTE — P.PN
Subjective


Progress Note Date: 12/20/21





History of present illness


56-year-old  male with past medical history of fibromyalgia comes in to

emergency room with symptoms of nausea, vomiting, dizziness and sweating feeling

weak in with runny nose loss of taste and hence now for the past 1 week.  He 

presented on 12/7 was found to have positive COVID results.  Received his 

monoclonal antibodies.  He Came back to the emergency room because of worsening 

of symptoms.Vitals reviewed patient afebrile pulse 80 respiratory rate 20 blood 

pressure 138/82 saturating at 88% on 10 L labs reviewed WBC 6.6 hemoglobin 13.9 

d-dimer is elevated at 0.89, sodium 135 bicarb 21 BUN 16 creatinine 1.3 glucose 

187 ferritin 2822 AST 89 ALT 80 alkaline phosphatase 225 LDH 1091 and CRP 21 and

albumin 3.3


X-ray suggestive of bilateral patchy pneumonia.  Both interstitial and airspace 

infiltrate


Pulmonary clinic consulted for COVID pneumonia


Patient placed on Lovenox 40 subcu twice a day, vitamin C, D and zinc.  

Dexamethasone initiated at 6 mg twice a day IV


Patient is a candidate of Baritinib and will discuss about initiating it with 

pulmonary


Venous Doppler ordered





12/10: [facility] follow[facility]up.  He continues to have 

dyspnea and is on 15 L high flow nasal cannula and nonrebreather with pulse ox 

of 87 and 95%.  He's been afebrile, heart rate in the 60s and 70s, blood 

pressure 127/73.  Pro-calcitonin came back high at 9.75 and we started the 

patient on Zosyn and vancomycin for bacterial pneumonia coverage.  Patient has 

coarse crackles bilaterally.  No lower extremity edema.  He has been seen and 

followed by pulmonary medicine.  Patient is not a candidate for Baricitinib.  

Patient is continued on Decadron, Lovenox and vitamin supplements.





12/11: Is found sitting up in the edge of the bed.  He is currently not on a 

Ventimask.  However his pulse oxing still around 88 to 91 % on Ventimask or 15 L

high flow nasal cannula. Patient continues to have dyspnea with activity and 

speaking. Patient states that he is feeling much better compared to yesterday.  

Continues to have a cough.  No lower extremity edema.  He has rhonchi to the 

bases bilaterally.  He is currently on Decadron Lovenox and vitamin supplements.

 Patient remains afebrile, heart rate 62, respirations 17, blood pressure 118/68

pulse ox 93% on 15 L high flow nasal cannula





12/12: Patient was up to the bathroom showering today.  Awaiting inflammation 

markers today.  Patient still requires 10 L of O2 with a pulse ox of 90%.  

Patient continues to have crackles to the bilateral bases.  Dyspnea with 

activity and speaking.  Patient states he is feeling better however he is very 

tired.  Continues to have cough no lower extremity edema.  He still currently on

Decadron, Lovenox, and vitamin supplements.  Patient remains afebrile, heart 

rate 58, respirations 17, blood pressure 118/89,





12/13: Patient is still on high flow nasal cannula 15 L and nonrebreather with 

pulse ox of 89 and 99%.  He's been afebrile, heart rate 73, blood pressure 

120/72.  Creatinine 1.06.  Blood sugars running between 101 and 106.  Sputum 

culture is in progress.  Patient is followed closely by pulmonary medicine.  

Patient is continued on dexamethasone, Lovenox, Zosyn and vitamin supplements.  

Patient has been encouraged to prone several hours daily.





12/14: Patient remains on nonrebreather mask and AirVo at 60 L, FiO2 of 85 with 

pulse ox of 88%.  Appears to have more difficulty with breathing.  Repeat chest 

x-ray reveals stable bilateral infiltrates.  He has been afebrile, heart rate 

59, blood pressure 102/59.  Repeat d-dimer 1.53.  Blood sugars are well 

controlled.





12/15: Patient remains on nonrebreather and AirVo with pulse ox at 90%.  Patient

was agreeable.  He has been afebrile, heart rate 70, blood pressure 131/79.  CBG

running between 97 and 116 and CBGs and insulin will be discontinued.  Sputum 

cultures positive for Candida albicans.  Patient is increasing his activity, 

currently laying in bed on his side.  Lab work including inflammatory markers 

ordered for tomorrow.





12/16: Patient had a drop in his pulse ox and 78% with nonrebreather and AirVo 

and A-Team was called.  Patient stated that his shortness of breath was better 

than the night before.  Stat chest x-ray was done which revealed bilateral 

multifocal peripheral increased opacities consistent with Covid 19 infection are

redemonstrated.  No significant change.  Patient transitioned to BiPAP with 

pulse ox of 90%.


Patient verbalized that he wanted to be a no CODE STATUS and was encouraged to 

discuss with family.  It was determined the patient will be transferred to the 

intensive care unit most likely be intubated.  Patient was very resistant but 

after long discussion with Dr. Austin wheeler, patient agreed to intubation and

full CODE STATUS at this point.  Repeat blood work reveals WBC 9.4, hemoglobin 

12.7, platelet count 263.  D-dimer 1.31.  Blood sugars





12/17: Patient was transferred into the intensive care unit yesterday and 

subsequently intubated and placed on mechanical ventilation, tidal volume 450, 

FiO2 100, PEEP of 20.  Patient was prone this morning switched over to supine.  

He was started on tube feedings yesterday but had a high residual this morning 

after pronating.  He is currently on fentanyl, propofol, Nimbex drip.  One amp 

of bicarbonate given this morning.


Chest x-ray reveals lateral multifocal confluent and increased opacities 

greatest in the periphery consistent with Covid 19 infection redemonstrated.  No

sicca be unchanged from one day earlier.  


CTA of the chest done yesterday reveals suboptimal study without saddle central 

pulmonary embolism.  Cannot exclude smaller pulmonary emboli.  Bilateral 

multifocal and confluent groundglass opacities and organizing consolidations 

consistent with known Covid 19 infection.


Repeat blood work reveals WBC 13.1, hemoglobin 13.1, platelet count 381.  D-

dimer 1.17.  Sodium 135, potassium 5.2, chloride 104, CO2 24, BUN 29 and 

creatinine 0.78.  Capillary blood glucose running between 116 and 150s.  AST 

216.  C-reactive protein 7.9, pro-calcitonin 0.15.





12/18: This is day #9 of admission, patient remains in ICU, still mechanically 

ventilated for hypoxemia, starting 12/17/2021, on Dickerson assist control, rate 32,

PEEP of 20, FiO2 100%, dexamethasone 6 mg daily, electrolytes are stable, BUN 29

creatinine 0.84, x-ray shows bilateral multifocal opacities, consistent with 

Covid pneumonia, CT angiogram negative for PE, patient is on propofol, fentanyl 

and index vital HP, at 20 mL an hour 





12/19: Patient remains in ICU, intubated, NG tube in place, chest x-ray shows no

pneumothorax, no pleural effusion, there is bilateral patchy pulmonary 

interstitial infiltrates, with coalescent density in the left lower lobe.  No 

significant change from yesterdayALT iscreatinine is 0.75, ALT is trending 

downward, C reactive protein is 8.0 from a previous of 8.9Lasix given today, on 

dexamethasone, and  remains sedated.





12/20: Patient remains in the intensive care unit, intubated and on mechanical 

ventilation with FiO2 60% and PEEP of 20 with pulse ox of 87%.  Patient is being

prone for 16 hours per day.  He is tolerating tube feedings.  He has been 

started on lactulose this morning for constipation.  He has been afebrile, heart

rate 75, respiratory rate in the low 30s, blood pressure 148/65.  Patient was 

started on Clevidipine gtt. he is also on propofol, fentanyl and Nimbex.  No new

concerns, patient seems to be fairly stable.





 


ROS


Unable to obtain due to intubation





Physical exam


General Appearance: Patient is laying in the ICU bed, intubated and on 

mechanical ventilation.  No acute distress.  Patient appears to be comfortable. 


Full examination deferred to intensive this due to intubation and Covid 19.





Assessment and plan


1. Acute hypoxic respiratory failure secondary to Covid 19 pneumonia.  Consult 

with pulmonary medicine appreciated.  Patient was intubated and placed on mec

hanical ventilation 12/16.  Continue care in the intensive care unit, proning, 

currently on fentanyl, propofol and Nimbex.





2. Acute COVID pneumonia. Dexamethasone 6 mg IV  once a day,  Lovenox 40 subcu 

daily, Not a candidate for Baricitinib, Monitor inflammatory markers. Continue 

supplements vitamin C, D and zinc





3. Acute transaminitis Secondary to viral inflammation. Continue monitoring





4. CKD stage 3.  Continue to monitor patient's creatinine.  No acute kidney 

injury





5. History of fibromyalgia. continue gabapentin 800 3 times a day, Citalopram 40

mg by mouth daily





6. Insomnia. Was on Eszopiclone 3 mg daily at bedtime 





7. Hypothyroidism. Levothyroxine 50 g by mouth daily





8.  Hypertension.  Patient started on  Clevidipine gtt. 





9.  DVT prophylaxis. Lovenox 40 subcu daily.





10. GI prophylaxis.  Protonix 40 mg IV push daily





11.  Hyperglycemia secondary to steroids, IV drips, patient is on NovoLog scale.





Prognosis guarded.





Status: Full code





Discharge Plan: 


To be determined.





Impression and plan of care have been directed as dictated by the signing 

physician.  Babita Hawkins nurse practitioner acting as scribe for signing 

physician.





Objective





- Vital Signs


Vital signs: 


                                   Vital Signs











Temp  98.6 F   12/20/21 08:00


 


Pulse  85   12/20/21 11:00


 


Resp  32 H  12/20/21 11:00


 


BP  92/63   12/17/21 21:00


 


Pulse Ox  87 L  12/20/21 11:00








                                 Intake & Output











 12/19/21 12/20/21 12/20/21





 18:59 06:59 18:59


 


Intake Total 2521.917 8034.028 533.614


 


Output Total 1850 750 600


 


Balance -196.485 426.028 -66.386


 


Weight   123 kg


 


Intake:   


 


   276 115


 


    Normal Saline 0.9 36 36 15





    Pressure Bag @ 3mL/hr   


 


    Sodium Chloride 0.9% 1, 570 240 100





    000 ml @ 20 mls/hr IV .   





    Q24H [facility] Rx#:[id number]   


 


  Intake, IV Titration 717.515 570.028 298.614





  Amount   


 


    Cisatracurium 200 mg In 198.595  124.58





    Sodium Chloride 0.9% 180   





    ml @ 1 MCG/KG/MIN 6.668   





    mls/hr IV .Q24H [facility] Rx#:   





    [id number]   


 


    Clevidipine Butyrate 25   0.6





    mg In Empty Bag 1 bag @ 1   





    MG/HR 2 mls/hr IV .Q24H   





    [facility] Rx#:[id number]   


 


    fentaNYL (PF). 1,000 mcg 390.015 370.028 80.569





    In Sodium Chloride 0.9%   





    80 ml @ 0.5 MCG/KG/HR 5.   





    557 mls/hr IV .Q18H [facility]   





    Rx#:[id number]   


 


    propofoL 1,000 mg In 128.905 200 92.865





    Empty Bag 1 bag @ Titrate   





    IV .Q0M [facility] Rx#:   





    [id number]   


 


  Tube Feeding 240 240 120


 


  Other 90 90 


 


Output:   


 


  Urine 1850 750 600


 


Other:   


 


  Voiding Method Indwelling Catheter Indwelling Catheter Indwelling Catheter








                       ABP, PAP, CO, CI - Last Documented











Arterial Blood Pressure        153/70

















- Labs


CBC & Chem 7: 


                                 12/20/21 03:45





                                 12/20/21 03:45


Labs: 


                  Abnormal Lab Results - Last 24 Hours (Table)











  12/19/21 12/19/21 12/19/21 Range/Units





  11:30 18:03 23:41 


 


WBC     (3.8-10.6)  k/uL


 


RBC     (4.30-5.90)  m/uL


 


Hgb     (13.0-17.5)  gm/dL


 


Neutrophils #     (1.3-7.7)  k/uL


 


Lymphocytes #     (1.0-4.8)  k/uL


 


ABG pCO2     (35-45)  mmHg


 


ABG pO2     ()  mmHg


 


ABG HCO3     (21-25)  mmol/L


 


ABG Total CO2     (19-24)  mmol/L


 


ABG O2 Saturation     (94-97)  %


 


BUN     (9-20)  mg/dL


 


Glucose     (74-99)  mg/dL


 


POC Glucose (mg/dL)  108 H  128 H  101 H  (75-99)  mg/dL


 


Calcium     (8.4-10.2)  mg/dL


 


ALT     (4-49)  U/L


 


Total Protein     (6.3-8.2)  g/dL


 


Albumin     (3.5-5.0)  g/dL














  12/20/21 12/20/21 12/20/21 Range/Units





  03:45 03:45 05:40 


 


WBC  13.9 H    (3.8-10.6)  k/uL


 


RBC  4.27 L    (4.30-5.90)  m/uL


 


Hgb  12.8 L    (13.0-17.5)  gm/dL


 


Neutrophils #  12.8 H    (1.3-7.7)  k/uL


 


Lymphocytes #  0.5 L    (1.0-4.8)  k/uL


 


ABG pCO2    50 H  (35-45)  mmHg


 


ABG pO2    64 L  ()  mmHg


 


ABG HCO3    32 H  (21-25)  mmol/L


 


ABG Total CO2    34 H  (19-24)  mmol/L


 


ABG O2 Saturation    93.9 L  (94-97)  %


 


BUN   38 H   (9-20)  mg/dL


 


Glucose   117 H   (74-99)  mg/dL


 


POC Glucose (mg/dL)     (75-99)  mg/dL


 


Calcium   8.0 L   (8.4-10.2)  mg/dL


 


ALT   100 H   (4-49)  U/L


 


Total Protein   5.5 L   (6.3-8.2)  g/dL


 


Albumin   2.4 L   (3.5-5.0)  g/dL

## 2021-12-20 NOTE — XR
EXAMINATION TYPE: XR chest 1V portable

 

DATE OF EXAM: 12/20/2021

 

CLINICAL HISTORY: Difficulty breathing progress study.  

 

TECHNIQUE: Single AP portable semiupright view of the chest is obtained.

 

COMPARISON: Chest x-ray from one day earlier and older studies.

 

Stable endotracheal and orogastric tubes. Stable left subclavian central venous catheter.

 

Persistent bilateral multifocal and confluent increased opacities redemonstrated. Cardiac silhouette 
size is stable and within normal limits. Osseous structures are intact. Right-sided upper thoracic wyman
bcutaneous air is redemonstrated extending into right neck.

 

IMPRESSION: Bilateral multifocal confluent and increased opacities greatest in the periphery consiste
nt with covid-19 infection are redemonstrated. No significant change from one day earlier.

## 2021-12-20 NOTE — P.PN
Subjective


Progress Note Date: 12/20/21








56-year-old male, with a history of coronavirus associated pneumonia.  The 

patient was admitted to the hospital on 12/09/2021 for COVID 19 related 

pneumonia and hypoxic respiratory failure.  He has not been vaccinated.  He has 

associated monoclonal antibodies on outpatient basis.  Despite that, the patie

nt's condition progressed.  The patient developed worsening hypoxemic 

respiratory failure, and was intubated and mechanically ventilated for 

hypoxemia, on 12/16/2021. .  He remains on the mechanical ventilator. 


On today's evaluation, the patient remains sedated and paralyzed.  The patient 

is currently, he's on volume assist control, rate 32, tidal volume 450, FiO2 

65%, and PEEP of 20.  Noted the patient was able to wean down his FiO2 after he 

was being prone.  He is being prone on a daily basis.  His last morning was 

performed at 10 PM last night.  The patient currently is down to an FiO2 of 60% 

with a PEEP of 20 and his current pulse ox of 92%.  The peak air pressure on a 

mechanical ventilator 35 with a static pressure of 33.  The chest x-ray was 

showing diffuse bilateral pulmonary infiltrates and ET tube is in a good 

location.  No evidence of any pneumothorax.  Note that this is yesterday chest 

x-ray and today's chest x-ray is still pending is the chest x-ray was postponed 

due to his prone body positioning.  On his blood gas, the patient has a pH of 

7.42 with a pCO2 of 50 and pO2 of 64 and this was done and FiO2 of 65%.  He did 

improve his oxygenation after being prone.  He remains on Decadron 6 mg IV once 

a day.  CT angiogram that was done at time of admission showed no evidence of 

any pulmonary embolism.  The patient remains on Lovenox 40 mg subcu for DVT 

prophylaxis.  The inflammatory markers include LDH level of 349 which is up 

considerably.  The patient also has a pro-calcitonin level of 0.15, note that 

his initial pro-calcitonin level was quite elevated at 9.7 and for that reason 

the patient was placed on empiric antibiotic coverage.  No other 

immunosuppressive agents have been utilizing this patient.  His sputum came back

positive for Candida albicans and the blood culture came back negative.  The 

patient is currently receiving enteral feeding for nutritional support and his 

using vital high protein at the rate of 20 mL an hour.  IV fluids are currently 

in the form of normal saline at the rate of 20 mL an hour and the fluid balance 

is up considerably at least 11 kg weight gain since admission.  The patient has 

been a positive fluid balance and he would definitely benefit from diuresis.  








Objective





- Vital Signs


Vital signs: 


                                   Vital Signs











Temp  98.6 F   12/20/21 04:00


 


Pulse  64   12/20/21 07:00


 


Resp  32 H  12/20/21 07:00


 


BP  92/63   12/17/21 21:00


 


Pulse Ox  94 L  12/20/21 07:00








                                 Intake & Output











 12/19/21 12/20/21 12/20/21





 18:59 06:59 18:59


 


Intake Total 1066.250 0722.028 341.014


 


Output Total 1850 750 


 


Balance -196.485 426.028 341.014


 


Intake:   


 


   276 23


 


    Normal Saline 0.9 36 36 3





    Pressure Bag @ 3mL/hr   


 


    Sodium Chloride 0.9% 1, 570 240 20





    000 ml @ 20 mls/hr IV .   





    Q24H [facility] Rx#:[id number]   


 


  Intake, IV Titration 717.515 570.028 298.014





  Amount   


 


    Cisatracurium 200 mg In 198.595  124.58





    Sodium Chloride 0.9% 180   





    ml @ 1 MCG/KG/MIN 6.668   





    mls/hr IV .Q24H [facility] Rx#:   





    [id number]   


 


    fentaNYL (PF). 1,000 mcg 390.015 370.028 80.569





    In Sodium Chloride 0.9%   





    80 ml @ 0.5 MCG/KG/HR 5.   





    557 mls/hr IV .Q18H [facility]   





    Rx#:[id number]   


 


    propofoL 1,000 mg In 128.905 200 92.865





    Empty Bag 1 bag @ Titrate   





    IV .Q0M [facility] Rx#:   





    [id number]   


 


  Tube Feeding 240 240 20


 


  Other 90 90 


 


Output:   


 


  Urine 1850 750 


 


Other:   


 


  Voiding Method Indwelling Catheter Indwelling Catheter 








                       ABP, PAP, CO, CI - Last Documented











Arterial Blood Pressure        155/62

















- Exam








No acute distress, sedated and paralyzed, with an orally placed endotracheal 

tube and NG tube.  The patient is currently in a prone body positioning.





HEENT examination is grossly unremarkable.  





Neck supple.  Full range of motion.  No adenopathy thyromegaly or neck vein 

distention.





Cardiovascular examination reveals regular rhythm rate.  S1-S2 normal.  No S3 or

S4.  No discernible murmur noted.  Heart sounds are distant.   





Lungs reveal coarse bilateral rhonchi.  No wheezes or crackles.  Breath sounds 

equal bilaterally.  





Abdomen soft bowel sounds are heard.  No masses or tenderness.





Extremities are intact.  No cyanosis clubbing or edema.





Skin is without rash or lesion.





Neurologic examination cannot be adequately assessed as the patient's currently 

sedated and paralyzed.








- Labs


CBC & Chem 7: 


                                 12/20/21 03:45





                                 12/20/21 03:45


Labs: 


                  Abnormal Lab Results - Last 24 Hours (Table)











  12/19/21 12/19/21 12/19/21 Range/Units





  11:30 18:03 23:41 


 


WBC     (3.8-10.6)  k/uL


 


RBC     (4.30-5.90)  m/uL


 


Hgb     (13.0-17.5)  gm/dL


 


Neutrophils #     (1.3-7.7)  k/uL


 


Lymphocytes #     (1.0-4.8)  k/uL


 


ABG pCO2     (35-45)  mmHg


 


ABG pO2     ()  mmHg


 


ABG HCO3     (21-25)  mmol/L


 


ABG Total CO2     (19-24)  mmol/L


 


ABG O2 Saturation     (94-97)  %


 


BUN     (9-20)  mg/dL


 


Glucose     (74-99)  mg/dL


 


POC Glucose (mg/dL)  108 H  128 H  101 H  (75-99)  mg/dL


 


Calcium     (8.4-10.2)  mg/dL


 


ALT     (4-49)  U/L


 


Total Protein     (6.3-8.2)  g/dL


 


Albumin     (3.5-5.0)  g/dL














  12/20/21 12/20/21 12/20/21 Range/Units





  03:45 03:45 05:40 


 


WBC  13.9 H    (3.8-10.6)  k/uL


 


RBC  4.27 L    (4.30-5.90)  m/uL


 


Hgb  12.8 L    (13.0-17.5)  gm/dL


 


Neutrophils #  12.8 H    (1.3-7.7)  k/uL


 


Lymphocytes #  0.5 L    (1.0-4.8)  k/uL


 


ABG pCO2    50 H  (35-45)  mmHg


 


ABG pO2    64 L  ()  mmHg


 


ABG HCO3    32 H  (21-25)  mmol/L


 


ABG Total CO2    34 H  (19-24)  mmol/L


 


ABG O2 Saturation    93.9 L  (94-97)  %


 


BUN   38 H   (9-20)  mg/dL


 


Glucose   117 H   (74-99)  mg/dL


 


POC Glucose (mg/dL)     (75-99)  mg/dL


 


Calcium   8.0 L   (8.4-10.2)  mg/dL


 


ALT   100 H   (4-49)  U/L


 


Total Protein   5.5 L   (6.3-8.2)  g/dL


 


Albumin   2.4 L   (3.5-5.0)  g/dL














Assessment and Plan


Plan: 








1 Acute hypoxemic respiratory failure secondary to coronavirus associated pneumo

arturo, with intubation and mechanical ventilation on 12/16/2021.No evidence of 

pulmonary embolism on CT angiogram.  The patient remains on a mechanical 

ventilator for now.  The patient is on a high PEEP and the current PEEP is at 20

with an FiO2 of 60% and the patient is receiving performed body positioning on a

daily basis.  There was less prone to 10 PM yesterday and the patient remains 

prone this morning.  Chest x-ray from yesterday showed diffuse bilateral 

pulmonary infiltrates.  Blood gases was noted.  The patient remains on Decadron.

 The pro-calcitonin level was elevated and the patient is currently on IV 

cefepime.  Of concern is a considerable amount of volume overloaded the patient 

has had since his admission.  He would benefit from diuresis.  





2 Elevated inflammatory markers secondary to acute coronavirus infection.





3 Mild transaminitis secondary to coronavirus infection.





4 History of fibromyalgia.





5 History of depression.





6 Acute kidney injury , recovered 





7 Hypothyroidism.








Plan: 





Continue sedation and paralysis


Patient is currently prone that was switched back to supine body position after 

16 hours of treatment


Repeat the blood gas was a supine and the necessity ventilator changes


Continue Decadron


He has completed his course of antibiotics and the pro-calcitonin level has 

normalized


Continue Lovenox 40 mg subcu for DVT prophylaxis


Continue enteral feeding for nutritional support


Start the patient on Lasix 40 daily grams IV every 12 hours and IV fluids keep 

at KVO


Dulcolax suppositories


Start lactulose


Condition remains critical.  I noted some elevation of blood pressure and the 

patient will be started on Cleviprex drip for blood pressure control


We'll continue to follow make further recommendations based on his progress.  

Prognosis poor based on the above.  We'll continue to follow.  This evaluation 

was done and more than 30 minutes.


Time with Patient: Greater than 30

## 2021-12-21 LAB
ALBUMIN SERPL-MCNC: 2.5 G/DL (ref 3.5–5)
ALP SERPL-CCNC: 121 U/L (ref 38–126)
ALT SERPL-CCNC: 124 U/L (ref 4–49)
ANION GAP SERPL CALC-SCNC: 7 MMOL/L
AST SERPL-CCNC: 59 U/L (ref 17–59)
BASOPHILS # BLD AUTO: 0 K/UL (ref 0–0.2)
BASOPHILS NFR BLD AUTO: 0 %
BUN SERPL-SCNC: 47 MG/DL (ref 9–20)
CALCIUM SPEC-MCNC: 8.6 MG/DL (ref 8.4–10.2)
CHLORIDE SERPL-SCNC: 102 MMOL/L (ref 98–107)
CO2 BLDA-SCNC: 35 MMOL/L (ref 19–24)
CO2 BLDA-SCNC: 36 MMOL/L (ref 19–24)
CO2 SERPL-SCNC: 29 MMOL/L (ref 22–30)
EOSINOPHIL # BLD AUTO: 0.1 K/UL (ref 0–0.7)
EOSINOPHIL NFR BLD AUTO: 0 %
ERYTHROCYTE [DISTWIDTH] IN BLOOD BY AUTOMATED COUNT: 4.08 M/UL (ref 4.3–5.9)
ERYTHROCYTE [DISTWIDTH] IN BLOOD: 13.5 % (ref 11.5–15.5)
GLUCOSE BLD-MCNC: 129 MG/DL (ref 75–99)
GLUCOSE BLD-MCNC: 135 MG/DL (ref 75–99)
GLUCOSE BLD-MCNC: 137 MG/DL (ref 75–99)
GLUCOSE BLD-MCNC: 163 MG/DL (ref 75–99)
GLUCOSE SERPL-MCNC: 135 MG/DL (ref 74–99)
HCO3 BLDA-SCNC: 33 MMOL/L (ref 21–25)
HCO3 BLDA-SCNC: 34 MMOL/L (ref 21–25)
HCT VFR BLD AUTO: 38.6 % (ref 39–53)
HGB BLD-MCNC: 12.2 GM/DL (ref 13–17.5)
LDH SPEC-CCNC: 564 U/L (ref 313–618)
LYMPHOCYTES # SPEC AUTO: 0.4 K/UL (ref 1–4.8)
LYMPHOCYTES NFR SPEC AUTO: 3 %
MCH RBC QN AUTO: 29.9 PG (ref 25–35)
MCHC RBC AUTO-ENTMCNC: 31.6 G/DL (ref 31–37)
MCV RBC AUTO: 94.6 FL (ref 80–100)
MONOCYTES # BLD AUTO: 0.5 K/UL (ref 0–1)
MONOCYTES NFR BLD AUTO: 4 %
NEUTROPHILS # BLD AUTO: 12.4 K/UL (ref 1.3–7.7)
NEUTROPHILS NFR BLD AUTO: 93 %
PCO2 BLDA: 60 MMHG (ref 35–45)
PCO2 BLDA: 66 MMHG (ref 35–45)
PH BLDA: 7.32 [PH] (ref 7.35–7.45)
PH BLDA: 7.36 [PH] (ref 7.35–7.45)
PLATELET # BLD AUTO: 349 K/UL (ref 150–450)
PO2 BLDA: 60 MMHG (ref 83–108)
PO2 BLDA: 63 MMHG (ref 83–108)
POTASSIUM SERPL-SCNC: 4.9 MMOL/L (ref 3.5–5.1)
PROT SERPL-MCNC: 5.8 G/DL (ref 6.3–8.2)
SODIUM SERPL-SCNC: 138 MMOL/L (ref 137–145)
WBC # BLD AUTO: 13.5 K/UL (ref 3.8–10.6)

## 2021-12-21 RX ADMIN — SODIUM CHLORIDE SCH MLS/HR: 900 INJECTION, SOLUTION INTRAVENOUS at 18:28

## 2021-12-21 RX ADMIN — DEXTRAN 70 AND HYPROMELLOSE 2910 SCH DROPS: 1; 3 SOLUTION/ DROPS OPHTHALMIC at 05:31

## 2021-12-21 RX ADMIN — CHLORHEXIDINE GLUCONATE SCH ML: 1.2 RINSE ORAL at 21:13

## 2021-12-21 RX ADMIN — SODIUM CHLORIDE SCH MLS/HR: 900 INJECTION, SOLUTION INTRAVENOUS at 07:58

## 2021-12-21 RX ADMIN — CEFAZOLIN SCH MLS/HR: 330 INJECTION, POWDER, FOR SOLUTION INTRAMUSCULAR; INTRAVENOUS at 21:07

## 2021-12-21 RX ADMIN — GABAPENTIN SCH MG: 400 CAPSULE ORAL at 21:13

## 2021-12-21 RX ADMIN — CHLORHEXIDINE GLUCONATE SCH ML: 1.2 RINSE ORAL at 08:14

## 2021-12-21 RX ADMIN — METOCLOPRAMIDE SCH MG: 5 INJECTION, SOLUTION INTRAMUSCULAR; INTRAVENOUS at 23:47

## 2021-12-21 RX ADMIN — ALBUTEROL SULFATE SCH PUFF: 90 AEROSOL, METERED RESPIRATORY (INHALATION) at 08:40

## 2021-12-21 RX ADMIN — INSULIN ASPART SCH: 100 INJECTION, SOLUTION INTRAVENOUS; SUBCUTANEOUS at 05:33

## 2021-12-21 RX ADMIN — SODIUM CHLORIDE SCH MLS/HR: 900 INJECTION, SOLUTION INTRAVENOUS at 15:10

## 2021-12-21 RX ADMIN — Medication SCH MCG: at 08:13

## 2021-12-21 RX ADMIN — INSULIN ASPART SCH UNIT: 100 INJECTION, SOLUTION INTRAVENOUS; SUBCUTANEOUS at 23:47

## 2021-12-21 RX ADMIN — SODIUM CHLORIDE SCH MLS/HR: 900 INJECTION, SOLUTION INTRAVENOUS at 11:16

## 2021-12-21 RX ADMIN — ALBUTEROL SULFATE SCH PUFF: 90 AEROSOL, METERED RESPIRATORY (INHALATION) at 14:48

## 2021-12-21 RX ADMIN — SODIUM CHLORIDE SCH MLS/HR: 900 INJECTION, SOLUTION INTRAVENOUS at 05:31

## 2021-12-21 RX ADMIN — SODIUM CHLORIDE SCH MLS/HR: 900 INJECTION, SOLUTION INTRAVENOUS at 01:07

## 2021-12-21 RX ADMIN — LEVOTHYROXINE SODIUM SCH MCG: 50 TABLET ORAL at 06:19

## 2021-12-21 RX ADMIN — Medication SCH MG: at 08:13

## 2021-12-21 RX ADMIN — METOCLOPRAMIDE SCH MG: 5 INJECTION, SOLUTION INTRAMUSCULAR; INTRAVENOUS at 06:19

## 2021-12-21 RX ADMIN — GABAPENTIN SCH MG: 400 CAPSULE ORAL at 18:17

## 2021-12-21 RX ADMIN — INSULIN ASPART SCH UNIT: 100 INJECTION, SOLUTION INTRAVENOUS; SUBCUTANEOUS at 11:51

## 2021-12-21 RX ADMIN — METOCLOPRAMIDE SCH MG: 5 INJECTION, SOLUTION INTRAMUSCULAR; INTRAVENOUS at 00:36

## 2021-12-21 RX ADMIN — CLEVIPIDINE SCH MLS/HR: 0.5 EMULSION INTRAVENOUS at 11:15

## 2021-12-21 RX ADMIN — DEXTRAN 70 AND HYPROMELLOSE 2910 SCH DROPS: 1; 3 SOLUTION/ DROPS OPHTHALMIC at 21:10

## 2021-12-21 RX ADMIN — INSULIN ASPART SCH: 100 INJECTION, SOLUTION INTRAVENOUS; SUBCUTANEOUS at 00:35

## 2021-12-21 RX ADMIN — OXYCODONE HYDROCHLORIDE AND ACETAMINOPHEN SCH MG: 500 TABLET ORAL at 08:14

## 2021-12-21 RX ADMIN — METOCLOPRAMIDE SCH MG: 5 INJECTION, SOLUTION INTRAMUSCULAR; INTRAVENOUS at 11:16

## 2021-12-21 RX ADMIN — LACTULOSE SCH GM: 20 SOLUTION ORAL at 08:13

## 2021-12-21 RX ADMIN — CISATRACURIUM BESYLATE SCH MLS/HR: 10 INJECTION INTRAVENOUS at 18:20

## 2021-12-21 RX ADMIN — CLEVIPIDINE SCH MLS/HR: 0.5 EMULSION INTRAVENOUS at 21:14

## 2021-12-21 RX ADMIN — CLEVIPIDINE SCH MLS/HR: 0.5 EMULSION INTRAVENOUS at 18:18

## 2021-12-21 RX ADMIN — DEXTROSE SCH MG: 50 INJECTION, SOLUTION INTRAVENOUS at 08:14

## 2021-12-21 RX ADMIN — FUROSEMIDE SCH MG: 10 INJECTION, SOLUTION INTRAMUSCULAR; INTRAVENOUS at 21:13

## 2021-12-21 RX ADMIN — ALBUTEROL SULFATE SCH PUFF: 90 AEROSOL, METERED RESPIRATORY (INHALATION) at 11:54

## 2021-12-21 RX ADMIN — DEXTRAN 70 AND HYPROMELLOSE 2910 SCH DROPS: 1; 3 SOLUTION/ DROPS OPHTHALMIC at 23:48

## 2021-12-21 RX ADMIN — DEXTRAN 70 AND HYPROMELLOSE 2910 SCH DROPS: 1; 3 SOLUTION/ DROPS OPHTHALMIC at 18:29

## 2021-12-21 RX ADMIN — FUROSEMIDE SCH MG: 10 INJECTION, SOLUTION INTRAMUSCULAR; INTRAVENOUS at 08:14

## 2021-12-21 RX ADMIN — DEXTRAN 70 AND HYPROMELLOSE 2910 SCH DROPS: 1; 3 SOLUTION/ DROPS OPHTHALMIC at 07:57

## 2021-12-21 RX ADMIN — GABAPENTIN SCH MG: 400 CAPSULE ORAL at 08:13

## 2021-12-21 RX ADMIN — DEXTRAN 70 AND HYPROMELLOSE 2910 SCH DROPS: 1; 3 SOLUTION/ DROPS OPHTHALMIC at 11:51

## 2021-12-21 RX ADMIN — SODIUM CHLORIDE SCH MLS/HR: 900 INJECTION, SOLUTION INTRAVENOUS at 21:45

## 2021-12-21 RX ADMIN — CISATRACURIUM BESYLATE SCH MLS/HR: 10 INJECTION INTRAVENOUS at 07:57

## 2021-12-21 RX ADMIN — PANTOPRAZOLE SODIUM SCH MG: 40 INJECTION, POWDER, FOR SOLUTION INTRAVENOUS at 08:14

## 2021-12-21 RX ADMIN — DEXTRAN 70 AND HYPROMELLOSE 2910 SCH DROPS: 1; 3 SOLUTION/ DROPS OPHTHALMIC at 00:34

## 2021-12-21 RX ADMIN — CITALOPRAM HYDROBROMIDE SCH MG: 20 TABLET ORAL at 08:13

## 2021-12-21 RX ADMIN — INSULIN ASPART SCH UNIT: 100 INJECTION, SOLUTION INTRAVENOUS; SUBCUTANEOUS at 18:30

## 2021-12-21 RX ADMIN — METOCLOPRAMIDE SCH MG: 5 INJECTION, SOLUTION INTRAMUSCULAR; INTRAVENOUS at 18:17

## 2021-12-21 RX ADMIN — ENOXAPARIN SODIUM SCH MG: 40 INJECTION SUBCUTANEOUS at 08:14

## 2021-12-21 NOTE — P.PN
Subjective


Progress Note Date: 12/21/21


Principal diagnosis: 


 Dyspnea, hypoxia, COVID-19





This is a very pleasant 56-year-old male patient with a known history of 

fibromyalgia.  Nonsmoker.  Approximate 1 week ago he developed symptoms of 

nausea vomiting diarrhea dizziness and sweating.  He presented here on 

12/07/2021 and has a positive for COVID-19 and received monoclonal antibodies.  

He came back to the emergency room last evening with worsening symptoms.  Chest 

x-ray reveals bilateral patchy interstitial and airspace infiltrates.  He is 

currently requiring 10 L high flow nasal cannula to maintain O2 saturations in 

the high 80s low 90s.  White count 6.6.  Hemoglobin 13.9.  Lymphocytes 0.3.  

Sodium 135.  Potassium 4.1.  Bicarb 21.  Creatinine 1.31.  Glucose 187.  

Ferritin 2822.  AST 89.  ALT 80.  LDH 1091.  C-reactive protein 21.0.  Initiated

on Decadron. 





On 12/10/2021 patient seen in follow-up on medical surgical floor, he is awake 

and alert, in no acute distress, overnight he wore 15 L high flow and n

onrebreather mask, he is currently off the nonrebreather mask and not he is on 

15 L high flow nasal cannula and he is maintaining O2 saturations at 93%, 

afebrile, hemodynamically stable.  However his oxygen requirements have 

significantly increased in last 24 hours.  His pro calcitonin level was sig

nificantly elevated at 9.75, and patient is not a candidate for Baricitinib in 

view of possibility of underlying bacterial infection.  Patient states she is 

coughing and bringing up some greenish colored phlegm at times, he is being 

started on Zosyn and vancomycin empirically, we will order blood cultures, 

sputum culture and urine cultures.  Clinically he looks fairly comfortable, no 

distress, lung sounds are revealing coarse diffuse rhonchi and rales.  No 

altered mentation, no fever.  He continues on Lovenox 40 mg twice daily, and 

Decadron 6 mg twice daily.











56-year-old male, with a history of coronavirus associated pneumonia.  The 

patient was admitted to the hospital on 12/09/2021 for COVID 19 related 

pneumonia and hypoxic respiratory failure.  He has not been vaccinated.  He has 

associated monoclonal antibodies on outpatient basis.  Despite that, the 

patient's condition progressed.  The patient developed worsening hypoxemic 

respiratory failure, and was intubated and mechanically ventilated for 

hypoxemia, on 12/16/2021. .  He remains on the mechanical ventilator. 


On today's evaluation, the patient remains sedated and paralyzed.  The patient 

is currently, he's on volume assist control, rate 32, tidal volume 450, FiO2 

65%, and PEEP of 20.  Noted the patient was able to wean down his FiO2 after he 

was being prone.  He is being prone on a daily basis.  His last morning was per

formed at 10 PM last night.  The patient currently is down to an FiO2 of 60% 

with a PEEP of 20 and his current pulse ox of 92%.  The peak air pressure on a 

mechanical ventilator 35 with a static pressure of 33.  The chest x-ray was 

showing diffuse bilateral pulmonary infiltrates and ET tube is in a good 

location.  No evidence of any pneumothorax.  Note that this is yesterday chest 

x-ray and today's chest x-ray is still pending is the chest x-ray was postponed 

due to his prone body positioning.  On his blood gas, the patient has a pH of 

7.42 with a pCO2 of 50 and pO2 of 64 and this was done and FiO2 of 65%.  He did 

improve his oxygenation after being prone.  He remains on Decadron 6 mg IV once 

a day.  CT angiogram that was done at time of admission showed no evidence of 

any pulmonary embolism.  The patient remains on Lovenox 40 mg subcu for DVT 

prophylaxis.  The inflammatory markers include LDH level of 349 which is up 

considerably.  The patient also has a pro-calcitonin level of 0.15, note that 

his initial pro-calcitonin level was quite elevated at 9.7 and for that reason 

the patient was placed on empiric antibiotic coverage.  No other 

immunosuppressive agents have been utilizing this patient.  His sputum came back

positive for Candida albicans and the blood culture came back negative.  The 

patient is currently receiving enteral feeding for nutritional support and his 

using vital high protein at the rate of 20 mL an hour.  IV fluids are currently 

in the form of normal saline at the rate of 20 mL an hour and the fluid balance 

is up considerably at least 11 kg weight gain since admission.  The patient has 

been a positive fluid balance and he would definitely benefit from diuresis. 





On 12/21/2021 patient seen in follow-up in the intensive care unit, he is 

currently supine, sedated, paralyzed, intubated.  He is on assist-control mode 

of ventilation with a rate of 32, tidal volume is 450, FiO2 of 65% and PEEP of 

20.  This morning's blood gas shows pO2 of 6 3, pCO2 of 60, and pH of 7.35 and 

this was done on FiO2 of 65.  His pulse ox is 85%.  He is currently on 0.9 norm

al saline better to 20 ML per hour, Diprivan is at 50 mics per kilo per minute, 

fat note is a 2.5 mics per kilo per minute, Cleviprex is 4 mg/h, Nimbex is a 2.5

mics per kilo per minute.  He is tolerating his tube feedings that are infusing 

at 15 ML per hour, standard water flushes.  Today's chest x-ray shows bilateral 

multifocal and confluent increased opacities greatest in the periphery consiste

nt with COVID-19 infection, no significant change compared one day earlier.  

Patient is on IV Decadron 6 more gram daily, yesterday we started IV Lasix 40 mg

every 12 hours, he is on prophylactic dose of Lovenox, COVID-19 multi-vitamins. 

he is on Protonix for GI prophylaxis, we added Reglan, and lactulose.  Patient 

was also given a suppository.  Patient has not had a stool yet, abdomen is 

obese, distended but nontender.  He is still in positive fluid balance overall 

since admission an average of 10 kg, however his weight is down 2 kg in the last

24 hours.  Patient is afebrile.  Today's labs have been reviewed showing white 

blood cell count fairly stable at 13.5, hemoglobin of 12.2, his d-dimer is 

slightly improved and is down to 1.09, electrolytes are within normal limits, 

BUN is 47, creatinine is 1.05, his LDH is 564, CRP is increased and is at 19.8. 

Patient was being placed in prone positioning on average of 16 hours daily 

however yesterday patient was noted to have developed deep tissue injury on his 

chin, and for that reason patient was not proned since yesterday.











Objective





- Vital Signs


Vital signs: 


                                   Vital Signs











Temp  99.5 F   12/21/21 00:00


 


Pulse  67   12/21/21 03:00


 


Resp  32 H  12/21/21 03:00


 


BP  92/63   12/17/21 21:00


 


Pulse Ox  87 L  12/21/21 03:00








                                 Intake & Output











 12/20/21 12/21/21 12/21/21





 18:59 06:59 18:59


 


Intake Total 2092.782 8261.675 236.721


 


Output Total 1435 975 


 


Balance 81.814 416.675 236.721


 


Weight 123 kg 121 kg 


 


Intake:   


 


   276 23


 


    Normal Saline 0.9 36 36 3





    Pressure Bag @ 3mL/hr   


 


    Sodium Chloride 0.9% 1, 240 240 20





    000 ml @ 20 mls/hr IV .   





    Q24H [facility] Rx#:[id number]   


 


  Intake, IV Titration 980.814 740.675 198.721





  Amount   


 


    Cisatracurium 200 mg In 188.371 69.347 130.653





    Sodium Chloride 0.9% 180   





    ml @ 1 MCG/KG/MIN 6.668   





    mls/hr IV .Q24H [facility] Rx#:   





    [id number]   


 


    Clevidipine Butyrate 25 37.667 0 





    mg In Empty Bag 1 bag @ 1   





    MG/HR 2 mls/hr IV .Q24H   





    [facility] Rx#:[id number]   


 


    fentaNYL (PF). 1,000 mcg 366.341 271.328 68.068





    In Sodium Chloride 0.9%   





    80 ml @ 0.5 MCG/KG/HR 5.   





    557 mls/hr IV .Q18H [facility]   





    Rx#:[id number]   


 


    propofoL 1,000 mg In 388.435 400 





    Empty Bag 1 bag @ Titrate   





    IV .Q0M [facility] Rx#:   





    [id number]   


 


  Tube Feeding 260 195 15


 


  Other  180 


 


Output:   


 


  Urine 1435 975 


 


Other:   


 


  Voiding Method Indwelling Catheter Indwelling Catheter 








                       ABP, PAP, CO, CI - Last Documented











Arterial Blood Pressure        110/58

















- Exam


 GENERAL EXAM: Intubated, sedated and paralyzed 56-year-old white male, on 

assist-control mode of ventilation with a rate of 32, tidal volume 450, FiO2 65%

and PEEP of 20


HEAD: Normocephalic/atraumatic. Area of deep tissue injury on chin


EYES: Normal reaction of pupils, equal size.  Conjunctiva pink, sclera white.


NOSE: Clear with pink turbinates.


THROAT: No erythema or exudates.


NECK: No masses, no JVD, no thyroid enlargement, no adenopathy.


CHEST: No chest wall deformity.  Symmetrical expansion. Left subclavian central 

line in place


LUNGS: Equal air entry with diffuse crackles, and rhonchi, no wheezing


CVS: Regular rate and rhythm, normal S1 and S2, no gallops, no murmurs, no rubs


ABDOMEN: Soft, nontender.  No hepatosplenomegaly, normal bowel sounds, no 

guarding or rigidity.


EXTREMITIES: No clubbing, no edema, no cyanosis, 2+ pulses and upper and lower 

extremities.


MUSCULOSKELETAL: Muscle strength and tone normal.


SPINE: No scoliosis or deformity


SKIN: No rashes


CENTRAL NERVOUS SYSTEM: Sedated, paralyzed, and intubated No focal deficits, 

tone is normal in all 4 extremities.











- Labs


CBC & Chem 7: 


                                 12/21/21 03:50





                                 12/21/21 03:50


Labs: 


                  Abnormal Lab Results - Last 24 Hours (Table)











  12/20/21 12/20/21 12/20/21 Range/Units





  11:38 14:28 18:02 


 


WBC     (3.8-10.6)  k/uL


 


RBC     (4.30-5.90)  m/uL


 


Hgb     (13.0-17.5)  gm/dL


 


Hct     (39.0-53.0)  %


 


Neutrophils #     (1.3-7.7)  k/uL


 


Lymphocytes #     (1.0-4.8)  k/uL


 


D-Dimer     (<0.60)  mg/L FEU


 


ABG pH   7.34 L   (7.35-7.45)  


 


ABG pCO2   60 H   (35-45)  mmHg


 


ABG pO2   55 L*   ()  mmHg


 


ABG HCO3   32 H   (21-25)  mmol/L


 


ABG Total CO2   34 H   (19-24)  mmol/L


 


ABG O2 Saturation   87.3 L   (94-97)  %


 


BUN     (9-20)  mg/dL


 


Glucose     (74-99)  mg/dL


 


POC Glucose (mg/dL)  175 H   134 H  (75-99)  mg/dL


 


ALT     (4-49)  U/L


 


C-Reactive Protein     (<1.0)  mg/dL


 


Total Protein     (6.3-8.2)  g/dL


 


Albumin     (3.5-5.0)  g/dL














  12/20/21 12/21/21 12/21/21 Range/Units





  23:45 03:50 03:50 


 


WBC   13.5 H   (3.8-10.6)  k/uL


 


RBC   4.08 L   (4.30-5.90)  m/uL


 


Hgb   12.2 L   (13.0-17.5)  gm/dL


 


Hct   38.6 L   (39.0-53.0)  %


 


Neutrophils #   12.4 H   (1.3-7.7)  k/uL


 


Lymphocytes #   0.4 L   (1.0-4.8)  k/uL


 


D-Dimer     (<0.60)  mg/L FEU


 


ABG pH     (7.35-7.45)  


 


ABG pCO2     (35-45)  mmHg


 


ABG pO2     ()  mmHg


 


ABG HCO3     (21-25)  mmol/L


 


ABG Total CO2     (19-24)  mmol/L


 


ABG O2 Saturation     (94-97)  %


 


BUN    47 H  (9-20)  mg/dL


 


Glucose    135 H  (74-99)  mg/dL


 


POC Glucose (mg/dL)  117 H    (75-99)  mg/dL


 


ALT    124 H  (4-49)  U/L


 


C-Reactive Protein    19.8 H  (<1.0)  mg/dL


 


Total Protein    5.8 L  (6.3-8.2)  g/dL


 


Albumin    2.5 L  (3.5-5.0)  g/dL














  12/21/21 12/21/21 Range/Units





  03:50 05:55 


 


WBC    (3.8-10.6)  k/uL


 


RBC    (4.30-5.90)  m/uL


 


Hgb    (13.0-17.5)  gm/dL


 


Hct    (39.0-53.0)  %


 


Neutrophils #    (1.3-7.7)  k/uL


 


Lymphocytes #    (1.0-4.8)  k/uL


 


D-Dimer  1.09 H   (<0.60)  mg/L FEU


 


ABG pH    (7.35-7.45)  


 


ABG pCO2    (35-45)  mmHg


 


ABG pO2    ()  mmHg


 


ABG HCO3    (21-25)  mmol/L


 


ABG Total CO2    (19-24)  mmol/L


 


ABG O2 Saturation    (94-97)  %


 


BUN    (9-20)  mg/dL


 


Glucose    (74-99)  mg/dL


 


POC Glucose (mg/dL)   129 H  (75-99)  mg/dL


 


ALT    (4-49)  U/L


 


C-Reactive Protein    (<1.0)  mg/dL


 


Total Protein    (6.3-8.2)  g/dL


 


Albumin    (3.5-5.0)  g/dL














Assessment and Plan


Plan: 


 Assessment:





#1.  Acute hypoxic respiratory failure secondary to acute COVID-19 pneumonia.  

Patient is status post monoclonal antibody infusion on 12/07/2021.  Patient was 

not a candidate for Remdesivir related to severe hypoxic respiratory failure on 

presentation requiring 10 L high flow nasal cannula.  Today he is on 15 L and 

100% nonrebreather mask, his FiO2 requirements have increased but patient is not

a candidate for Baricitinib related to possibility of underlying bacterial 

infection.  Patient was transferred to the intensive care unit on 12/16/2021 and

intubated on 12/16/2021





#2.  Elevated pro-calcitonin, improved, blood and sputum cultures have shown no 

growth, patient has received empiric Zosyn, pro-calcitonin level has improved 

and is down to 0.17, Zosyn has been discontinued





#3.  Elevated inflammatory markers related to acute COVID-19 pneumonia





#4.  Elevated LFTs related to COVID-19 pneumonia, slightly worsened





#5.  Elevated d-dimer, venous Doppler of bilateral lower extremities was 

negative for DVT, CT angiogram was a limited study but showed no central 

pulmonary embolism





#6.  History of fibromyalgia





#7.  Depression





#8.  Never smoker





#9.  Acute kidney injury possibly related to Covid 19 related ATN, improved





#10.  Hypothyroidism





#11.  Hyperkalemia, mild, improved





Plan:





Continue current vent settings


Continue sedation and Nimbex


Continue fentanyl for discomfort


Patient has developed deep tissue injury on the chin, and for that reason he was

not proned yesterday


Continue Cleviprex for hypertension


Continue nutritional support


We'll give 1 amp of sodium bicarb


Chest x-ray has been reviewed showing no significant change in the appearance of

bilateral opacities


Overall prognosis is guarded





I performed a history & physical examination of the patient and discussed their 

management with my nurse practitioner, Lissy Brody.  I reviewed the nurse 

practitioner's note and agree with the documented findings and plan of care.  

Lung sounds are positive for dim breath sounds throughout the lung fields.  The 

findings and the impression was discussed with the patient.  I attest to the 

documentation by the nurse practitioner. 








Time with Patient: Greater than 30

## 2021-12-21 NOTE — XR
EXAMINATION TYPE: XR chest 1V portable

 

DATE OF EXAM: 12/21/2021

 

CLINICAL HISTORY: Difficulty breathing progress study.  

 

TECHNIQUE: Single AP portable semiupright view of the chest is obtained.

 

COMPARISON: Chest x-ray from one day earlier and older studies

 

FINDINGS:

Stable endotracheal and orogastric tubes. Stable left subclavian central venous catheter.

 

Persistent bilateral multifocal and confluent increased opacities greatest in the periphery redemonst
rated. Cardiac silhouette size is stable and within normal limits. Osseous structures are intact. Rig
ht-sided upper thoracic subcutaneous air is redemonstrated.

 

IMPRESSION: Bilateral multifocal and confluent increased opacities greatest in the periphery consiste
nt with covid-19 infection are redemonstrated. No significant change from one day earlier.

## 2021-12-21 NOTE — P.PN
Subjective


Progress Note Date: 12/21/21





History of present illness


56-year-old  male with past medical history of fibromyalgia comes in to

emergency room with symptoms of nausea, vomiting, dizziness and sweating feeling

weak in with runny nose loss of taste and hence now for the past 1 week.  He 

presented on 12/7 was found to have positive COVID results.  Received his 

monoclonal antibodies.  He Came back to the emergency room because of worsening 

of symptoms.Vitals reviewed patient afebrile pulse 80 respiratory rate 20 blood 

pressure 138/82 saturating at 88% on 10 L labs reviewed WBC 6.6 hemoglobin 13.9 

d-dimer is elevated at 0.89, sodium 135 bicarb 21 BUN 16 creatinine 1.3 glucose 

187 ferritin 2822 AST 89 ALT 80 alkaline phosphatase 225 LDH 1091 and CRP 21 and

albumin 3.3


X-ray suggestive of bilateral patchy pneumonia.  Both interstitial and airspace 

infiltrate


Pulmonary clinic consulted for COVID pneumonia


Patient placed on Lovenox 40 subcu twice a day, vitamin C, D and zinc.  

Dexamethasone initiated at 6 mg twice a day IV


Patient is a candidate of Baritinib and will discuss about initiating it with 

pulmonary


Venous Doppler ordered





12/10: Patient is seen on the [facility] floor in follow-up.  He continues to have 

dyspnea and is on 15 L high flow nasal cannula and nonrebreather with pulse ox 

of 87 and 95%.  He's been afebrile, heart rate in the 60s and 70s, blood 

pressure 127/73.  Pro-calcitonin came back high at 9.75 and we started the 

patient on Zosyn and vancomycin for bacterial pneumonia coverage.  Patient has 

coarse crackles bilaterally.  No lower extremity edema.  He has been seen and 

followed by pulmonary medicine.  Patient is not a candidate for Baricitinib.  

Patient is continued on Decadron, Lovenox and vitamin supplements.





12/11: Is found sitting up in the edge of the bed.  He is currently not on a 

Ventimask.  However his pulse oxing still around 88 to 91 % on Ventimask or 15 L

high flow nasal cannula. Patient continues to have dyspnea with activity and 

speaking. Patient states that he is feeling much better compared to yesterday.  

Continues to have a cough.  No lower extremity edema.  He has rhonchi to the 

bases bilaterally.  He is currently on Decadron Lovenox and vitamin supplements.

 Patient remains afebrile, heart rate 62, respirations 17, blood pressure 118/68

pulse ox 93% on 15 L high flow nasal cannula





12/12: Patient was up to the bathroom showering today.  Awaiting inflammation 

markers today.  Patient still requires 10 L of O2 with a pulse ox of 90%.  

Patient continues to have crackles to the bilateral bases.  Dyspnea with 

activity and speaking.  Patient states he is feeling better however he is very 

tired.  Continues to have cough no lower extremity edema.  He still currently on

Decadron, Lovenox, and vitamin supplements.  Patient remains afebrile, heart 

rate 58, respirations 17, blood pressure 118/89,





12/13: Patient is still on high flow nasal cannula 15 L and nonrebreather with 

pulse ox of 89 and 99%.  He's been afebrile, heart rate 73, blood pressure 

120/72.  Creatinine 1.06.  Blood sugars running between 101 and 106.  Sputum 

culture is in progress.  Patient is followed closely by pulmonary medicine.  

Patient is continued on dexamethasone, Lovenox, Zosyn and vitamin supplements.  

Patient has been encouraged to prone several hours daily.





12/14: Patient remains on nonrebreather mask and AirVo at 60 L, FiO2 of 85 with 

pulse ox of 88%.  Appears to have more difficulty with breathing.  Repeat chest 

x-ray reveals stable bilateral infiltrates.  He has been afebrile, heart rate 

59, blood pressure 102/59.  Repeat d-dimer 1.53.  Blood sugars are well 

controlled.





12/15: Patient remains on nonrebreather and AirVo with pulse ox at 90%.  Patient

was agreeable.  He has been afebrile, heart rate 70, blood pressure 131/79.  CBG

running between 97 and 116 and CBGs and insulin will be discontinued.  Sputum 

cultures positive for Candida albicans.  Patient is increasing his activity, 

currently laying in bed on his side.  Lab work including inflammatory markers 

ordered for tomorrow.





12/16: Patient had a drop in his pulse ox and 78% with nonrebreather and AirVo 

and A-Team was called.  Patient stated that his shortness of breath was better 

than the night before.  Stat chest x-ray was done which revealed bilateral 

multifocal peripheral increased opacities consistent with Covid 19 infection are

redemonstrated.  No significant change.  Patient transitioned to BiPAP with 

pulse ox of 90%.


Patient verbalized that he wanted to be a no CODE STATUS and was encouraged to 

discuss with family.  It was determined the patient will be transferred to the 

intensive care unit most likely be intubated.  Patient was very resistant but 

after long discussion with Dr. Austin wheeler, patient agreed to intubation and

full CODE STATUS at this point.  Repeat blood work reveals WBC 9.4, hemoglobin 

12.7, platelet count 263.  D-dimer 1.31.  Blood sugars





12/17: Patient was transferred into the intensive care unit yesterday and 

subsequently intubated and placed on mechanical ventilation, tidal volume 450, 

FiO2 100, PEEP of 20.  Patient was prone this morning switched over to supine.  

He was started on tube feedings yesterday but had a high residual this morning 

after pronating.  He is currently on fentanyl, propofol, Nimbex drip.  One amp 

of bicarbonate given this morning.


Chest x-ray reveals lateral multifocal confluent and increased opacities 

greatest in the periphery consistent with Covid 19 infection redemonstrated.  No

sicca be unchanged from one day earlier.  


CTA of the chest done yesterday reveals suboptimal study without saddle central 

pulmonary embolism.  Cannot exclude smaller pulmonary emboli.  Bilateral 

multifocal and confluent groundglass opacities and organizing consolidations 

consistent with known Covid 19 infection.


Repeat blood work reveals WBC 13.1, hemoglobin 13.1, platelet count 381.  D-

dimer 1.17.  Sodium 135, potassium 5.2, chloride 104, CO2 24, BUN 29 and 

creatinine 0.78.  Capillary blood glucose running between 116 and 150s.  AST 

216.  C-reactive protein 7.9, pro-calcitonin 0.15.





12/18: This is day #9 of admission, patient remains in ICU, still mechanically 

ventilated for hypoxemia, starting 12/17/2021, on Dickerson assist control, rate 32,

PEEP of 20, FiO2 100%, dexamethasone 6 mg daily, electrolytes are stable, BUN 29

creatinine 0.84, x-ray shows bilateral multifocal opacities, consistent with 

Covid pneumonia, CT angiogram negative for PE, patient is on propofol, fentanyl 

and index vital HP, at 20 mL an hour 





12/19: Patient remains in ICU, intubated, NG tube in place, chest x-ray shows no

pneumothorax, no pleural effusion, there is bilateral patchy pulmonary 

interstitial infiltrates, with coalescent density in the left lower lobe.  No 

significant change from yesterdayALT iscreatinine is 0.75, ALT is trending 

downward, C reactive protein is 8.0 from a previous of 8.9Lasix given today, on 

dexamethasone, and  remains sedated.





12/20: Patient remains in the intensive care unit, intubated and on mechanical 

ventilation with FiO2 60% and PEEP of 20 with pulse ox of 87%.  Patient is being

prone for 16 hours per day.  He is tolerating tube feedings.  He has been 

started on lactulose this morning for constipation.  He has been afebrile, heart

rate 75, respiratory rate in the low 30s, blood pressure 148/65.  Patient was 

started on Clevidipine gtt. he is also on propofol, fentanyl and Nimbex.  No new

concerns, patient seems to be fairly stable.





12/21: Patient remains in intensive care unit intubated and on mechanical 

ventilation with FiO2 of 65, tidal volume 450, PEEP of 20.  Patient is being 

managed by the intensivist.  Patient has not been prone today due to skin 

breakdown on his face.  He is on tube feedings and tolerating.  Patient has been

afebrile, heart rate 102, blood pressure 151/71, pulse ox 85-89%.  Repeat blood 

work reveals WBC 13.5, hemoglobin 12.2.  Electrolytes normal.  BUN 47 creatinine

1.05.  .  C-reactive protein 19.8.  D-dimer 1.09.  The patient did not 

have a bowel movement after lactulose yesterday which is been repeated and 

Dulcolax suppository ordered for today.  








ROS


Unable to obtain due to intubation





Physical exam


General Appearance: Patient is laying in the ICU bed, intubated and on 

mechanical ventilation.  No acute distress.  Patient appears to be comfortable. 


Full examination deferred to intensive this due to intubation and Covid 19.





Assessment and plan


1. Acute hypoxic respiratory failure secondary to Covid 19 pneumonia.  Consult 

with pulmonary medicine appreciated.  Patient was intubated and placed on 

mechanical ventilation 12/16.  Continue care in the intensive care unit, 

proning, currently on fentanyl, propofol and Nimbex.





2. Acute COVID pneumonia. Dexamethasone 6 mg IV  once a day,  Lovenox 40 subcu 

daily, Not a candidate for Baricitinib, Monitor inflammatory markers. Continue 

supplements vitamin C, D and zinc





3. Acute transaminitis Secondary to viral inflammation. Continue monitoring





4. CKD stage 3.  Continue to monitor patient's creatinine.  No acute kidney 

injury





5. History of fibromyalgia. continue gabapentin 800 3 times a day, Citalopram 40

mg by mouth daily





6. Insomnia. Was on Eszopiclone 3 mg daily at bedtime 





7. Hypothyroidism. Levothyroxine 50 g by mouth daily





8.  Hypertension.  Patient started on  Clevidipine gtt. 





9.  DVT prophylaxis. Lovenox 40 subcu daily.





10. GI prophylaxis.  Protonix 40 mg IV push daily





11.  Hyperglycemia secondary to steroids, IV drips, patient is on NovoLog scale.





12.  Constipation.  Lactulose 20 g daily and Vioxx suppository today.





Prognosis guarded.





Status: Full code





Discharge Plan: 


To be determined.





Impression and plan of care have been directed as dictated by the signing 

physician.  Babita Hawkins nurse practitioner acting as scribe for signing 

physician.





Objective





- Vital Signs


Vital signs: 


                                   Vital Signs











Temp  98.5 F   12/21/21 08:00


 


Pulse  90   12/21/21 11:00


 


Resp  32 H  12/21/21 11:00


 


BP  92/63   12/17/21 21:00


 


Pulse Ox  85 L  12/21/21 11:00








                                 Intake & Output











 12/20/21 12/21/21 12/21/21





 18:59 06:59 18:59


 


Intake Total 1547.773 4640.675 632.027


 


Output Total 1435 975 825


 


Balance 81.814 416.675 -192.973


 


Weight 123 kg 121 kg 


 


Intake:   


 


   276 92


 


    Normal Saline 0.9 36 36 12





    Pressure Bag @ 3mL/hr   


 


    Sodium Chloride 0.9% 1, 240 240 80





    000 ml @ 20 mls/hr IV .   





    Q24H [facility] Rx#:[id number]   


 


  Intake, IV Titration 980.814 740.675 480.027





  Amount   


 


    Cisatracurium 200 mg In 188.371 69.347 184.275





    Sodium Chloride 0.9% 180   





    ml @ 1 MCG/KG/MIN 6.668   





    mls/hr IV .Q24H [facility] Rx#:   





    [id number]   


 


    Clevidipine Butyrate 25 37.667 0 36





    mg In Empty Bag 1 bag @ 1   





    MG/HR 2 mls/hr IV .Q24H   





    [facility] Rx#:[id number]   


 


    fentaNYL (PF). 1,000 mcg 366.341 271.328 159.752





    In Sodium Chloride 0.9%   





    80 ml @ 0.5 MCG/KG/HR 5.   





    557 mls/hr IV .Q18H [facility]   





    Rx#:[id number]   


 


    propofoL 1,000 mg In 388.435 400 100





    Empty Bag 1 bag @ Titrate   





    IV .Q0M [facility] Rx#:   





    [id number]   


 


  Tube Feeding 260 195 60


 


  Other  180 


 


Output:   


 


  Urine 1435 975 825


 


Other:   


 


  Voiding Method Indwelling Catheter Indwelling Catheter Indwelling Catheter








                       ABP, PAP, CO, CI - Last Documented











Arterial Blood Pressure        162/74

















- Labs


CBC & Chem 7: 


                                 12/21/21 03:50





                                 12/21/21 03:50


Labs: 


                  Abnormal Lab Results - Last 24 Hours (Table)











  12/20/21 12/20/21 12/20/21 Range/Units





  11:38 14:28 18:02 


 


WBC     (3.8-10.6)  k/uL


 


RBC     (4.30-5.90)  m/uL


 


Hgb     (13.0-17.5)  gm/dL


 


Hct     (39.0-53.0)  %


 


Neutrophils #     (1.3-7.7)  k/uL


 


Lymphocytes #     (1.0-4.8)  k/uL


 


D-Dimer     (<0.60)  mg/L FEU


 


ABG pH   7.34 L   (7.35-7.45)  


 


ABG pCO2   60 H   (35-45)  mmHg


 


ABG pO2   55 L*   ()  mmHg


 


ABG HCO3   32 H   (21-25)  mmol/L


 


ABG Total CO2   34 H   (19-24)  mmol/L


 


ABG O2 Saturation   87.3 L   (94-97)  %


 


BUN     (9-20)  mg/dL


 


Glucose     (74-99)  mg/dL


 


POC Glucose (mg/dL)  175 H   134 H  (75-99)  mg/dL


 


ALT     (4-49)  U/L


 


C-Reactive Protein     (<1.0)  mg/dL


 


Total Protein     (6.3-8.2)  g/dL


 


Albumin     (3.5-5.0)  g/dL














  12/20/21 12/21/21 12/21/21 Range/Units





  23:45 03:50 03:50 


 


WBC   13.5 H   (3.8-10.6)  k/uL


 


RBC   4.08 L   (4.30-5.90)  m/uL


 


Hgb   12.2 L   (13.0-17.5)  gm/dL


 


Hct   38.6 L   (39.0-53.0)  %


 


Neutrophils #   12.4 H   (1.3-7.7)  k/uL


 


Lymphocytes #   0.4 L   (1.0-4.8)  k/uL


 


D-Dimer     (<0.60)  mg/L FEU


 


ABG pH     (7.35-7.45)  


 


ABG pCO2     (35-45)  mmHg


 


ABG pO2     ()  mmHg


 


ABG HCO3     (21-25)  mmol/L


 


ABG Total CO2     (19-24)  mmol/L


 


ABG O2 Saturation     (94-97)  %


 


BUN    47 H  (9-20)  mg/dL


 


Glucose    135 H  (74-99)  mg/dL


 


POC Glucose (mg/dL)  117 H    (75-99)  mg/dL


 


ALT    124 H  (4-49)  U/L


 


C-Reactive Protein    19.8 H  (<1.0)  mg/dL


 


Total Protein    5.8 L  (6.3-8.2)  g/dL


 


Albumin    2.5 L  (3.5-5.0)  g/dL














  12/21/21 12/21/21 Range/Units





  03:50 05:55 


 


WBC    (3.8-10.6)  k/uL


 


RBC    (4.30-5.90)  m/uL


 


Hgb    (13.0-17.5)  gm/dL


 


Hct    (39.0-53.0)  %


 


Neutrophils #    (1.3-7.7)  k/uL


 


Lymphocytes #    (1.0-4.8)  k/uL


 


D-Dimer  1.09 H   (<0.60)  mg/L FEU


 


ABG pH    (7.35-7.45)  


 


ABG pCO2    (35-45)  mmHg


 


ABG pO2    ()  mmHg


 


ABG HCO3    (21-25)  mmol/L


 


ABG Total CO2    (19-24)  mmol/L


 


ABG O2 Saturation    (94-97)  %


 


BUN    (9-20)  mg/dL


 


Glucose    (74-99)  mg/dL


 


POC Glucose (mg/dL)   129 H  (75-99)  mg/dL


 


ALT    (4-49)  U/L


 


C-Reactive Protein    (<1.0)  mg/dL


 


Total Protein    (6.3-8.2)  g/dL


 


Albumin    (3.5-5.0)  g/dL

## 2021-12-22 LAB
ALBUMIN SERPL-MCNC: 2.7 G/DL (ref 3.5–5)
ALP SERPL-CCNC: 129 U/L (ref 38–126)
ALT SERPL-CCNC: 119 U/L (ref 4–49)
ANION GAP SERPL CALC-SCNC: 2 MMOL/L
AST SERPL-CCNC: 41 U/L (ref 17–59)
BASOPHILS # BLD AUTO: 0 K/UL (ref 0–0.2)
BASOPHILS NFR BLD AUTO: 0 %
BUN SERPL-SCNC: 50 MG/DL (ref 9–20)
CALCIUM SPEC-MCNC: 8.8 MG/DL (ref 8.4–10.2)
CHLORIDE SERPL-SCNC: 102 MMOL/L (ref 98–107)
CO2 BLDA-SCNC: 39 MMOL/L (ref 19–24)
CO2 SERPL-SCNC: 36 MMOL/L (ref 22–30)
EOSINOPHIL # BLD AUTO: 0.1 K/UL (ref 0–0.7)
EOSINOPHIL NFR BLD AUTO: 0 %
ERYTHROCYTE [DISTWIDTH] IN BLOOD BY AUTOMATED COUNT: 4.22 M/UL (ref 4.3–5.9)
ERYTHROCYTE [DISTWIDTH] IN BLOOD: 13.6 % (ref 11.5–15.5)
GLUCOSE BLD-MCNC: 124 MG/DL (ref 75–99)
GLUCOSE BLD-MCNC: 134 MG/DL (ref 75–99)
GLUCOSE BLD-MCNC: 141 MG/DL (ref 75–99)
GLUCOSE BLD-MCNC: 154 MG/DL (ref 75–99)
GLUCOSE BLD-MCNC: 170 MG/DL (ref 75–99)
GLUCOSE SERPL-MCNC: 138 MG/DL (ref 74–99)
HCO3 BLDA-SCNC: 37 MMOL/L (ref 21–25)
HCT VFR BLD AUTO: 40.5 % (ref 39–53)
HGB BLD-MCNC: 12.8 GM/DL (ref 13–17.5)
LYMPHOCYTES # SPEC AUTO: 0.5 K/UL (ref 1–4.8)
LYMPHOCYTES NFR SPEC AUTO: 4 %
MAGNESIUM SPEC-SCNC: 2.6 MG/DL (ref 1.6–2.3)
MCH RBC QN AUTO: 30.2 PG (ref 25–35)
MCHC RBC AUTO-ENTMCNC: 31.5 G/DL (ref 31–37)
MCV RBC AUTO: 96 FL (ref 80–100)
MONOCYTES # BLD AUTO: 0.8 K/UL (ref 0–1)
MONOCYTES NFR BLD AUTO: 6 %
NEUTROPHILS # BLD AUTO: 13.3 K/UL (ref 1.3–7.7)
NEUTROPHILS NFR BLD AUTO: 90 %
PCO2 BLDA: 66 MMHG (ref 35–45)
PH BLDA: 7.36 [PH] (ref 7.35–7.45)
PLATELET # BLD AUTO: 449 K/UL (ref 150–450)
PO2 BLDA: 61 MMHG (ref 83–108)
POTASSIUM SERPL-SCNC: 4.9 MMOL/L (ref 3.5–5.1)
POTASSIUM SERPL-SCNC: 5 MMOL/L (ref 3.5–5.1)
PROT SERPL-MCNC: 6.3 G/DL (ref 6.3–8.2)
SODIUM SERPL-SCNC: 140 MMOL/L (ref 137–145)
WBC # BLD AUTO: 14.9 K/UL (ref 3.8–10.6)

## 2021-12-22 RX ADMIN — DEXTROSE SCH MG: 50 INJECTION, SOLUTION INTRAVENOUS at 09:47

## 2021-12-22 RX ADMIN — METOCLOPRAMIDE SCH MG: 5 INJECTION, SOLUTION INTRAMUSCULAR; INTRAVENOUS at 23:47

## 2021-12-22 RX ADMIN — CLEVIPIDINE SCH MLS/HR: 0.5 EMULSION INTRAVENOUS at 02:40

## 2021-12-22 RX ADMIN — GABAPENTIN SCH MG: 400 CAPSULE ORAL at 09:46

## 2021-12-22 RX ADMIN — ENOXAPARIN SODIUM SCH: 40 INJECTION SUBCUTANEOUS at 03:10

## 2021-12-22 RX ADMIN — CLEVIPIDINE SCH MLS/HR: 0.5 EMULSION INTRAVENOUS at 21:00

## 2021-12-22 RX ADMIN — Medication SCH MCG: at 09:47

## 2021-12-22 RX ADMIN — DEXTRAN 70 AND HYPROMELLOSE 2910 SCH DROPS: 1; 3 SOLUTION/ DROPS OPHTHALMIC at 22:29

## 2021-12-22 RX ADMIN — INSULIN ASPART SCH: 100 INJECTION, SOLUTION INTRAVENOUS; SUBCUTANEOUS at 05:36

## 2021-12-22 RX ADMIN — SODIUM CHLORIDE SCH MLS/HR: 900 INJECTION, SOLUTION INTRAVENOUS at 11:48

## 2021-12-22 RX ADMIN — ALBUTEROL SULFATE SCH PUFF: 90 AEROSOL, METERED RESPIRATORY (INHALATION) at 07:46

## 2021-12-22 RX ADMIN — CEFAZOLIN SCH MLS/HR: 330 INJECTION, POWDER, FOR SOLUTION INTRAMUSCULAR; INTRAVENOUS at 22:29

## 2021-12-22 RX ADMIN — GABAPENTIN SCH MG: 400 CAPSULE ORAL at 22:28

## 2021-12-22 RX ADMIN — INSULIN ASPART SCH UNIT: 100 INJECTION, SOLUTION INTRAVENOUS; SUBCUTANEOUS at 23:46

## 2021-12-22 RX ADMIN — OXYCODONE HYDROCHLORIDE AND ACETAMINOPHEN SCH MG: 500 TABLET ORAL at 09:46

## 2021-12-22 RX ADMIN — PANTOPRAZOLE SODIUM SCH MG: 40 INJECTION, POWDER, FOR SOLUTION INTRAVENOUS at 09:48

## 2021-12-22 RX ADMIN — METOCLOPRAMIDE SCH MG: 5 INJECTION, SOLUTION INTRAMUSCULAR; INTRAVENOUS at 05:45

## 2021-12-22 RX ADMIN — SODIUM CHLORIDE SCH MLS/HR: 900 INJECTION, SOLUTION INTRAVENOUS at 01:13

## 2021-12-22 RX ADMIN — DEXTRAN 70 AND HYPROMELLOSE 2910 SCH DROPS: 1; 3 SOLUTION/ DROPS OPHTHALMIC at 02:38

## 2021-12-22 RX ADMIN — INSULIN ASPART SCH UNIT: 100 INJECTION, SOLUTION INTRAVENOUS; SUBCUTANEOUS at 12:39

## 2021-12-22 RX ADMIN — CLEVIPIDINE SCH MLS/HR: 0.5 EMULSION INTRAVENOUS at 08:16

## 2021-12-22 RX ADMIN — SODIUM CHLORIDE SCH MLS/HR: 900 INJECTION, SOLUTION INTRAVENOUS at 18:40

## 2021-12-22 RX ADMIN — DEXTRAN 70 AND HYPROMELLOSE 2910 SCH DROPS: 1; 3 SOLUTION/ DROPS OPHTHALMIC at 09:49

## 2021-12-22 RX ADMIN — DEXTRAN 70 AND HYPROMELLOSE 2910 SCH DROPS: 1; 3 SOLUTION/ DROPS OPHTHALMIC at 15:33

## 2021-12-22 RX ADMIN — GABAPENTIN SCH MG: 400 CAPSULE ORAL at 16:59

## 2021-12-22 RX ADMIN — LACTULOSE SCH GM: 20 SOLUTION ORAL at 09:50

## 2021-12-22 RX ADMIN — DILTIAZEM HYDROCHLORIDE SCH MLS/HR: 5 INJECTION INTRAVENOUS at 23:47

## 2021-12-22 RX ADMIN — CISATRACURIUM BESYLATE SCH MLS/HR: 10 INJECTION INTRAVENOUS at 14:00

## 2021-12-22 RX ADMIN — DEXTRAN 70 AND HYPROMELLOSE 2910 SCH DROPS: 1; 3 SOLUTION/ DROPS OPHTHALMIC at 23:46

## 2021-12-22 RX ADMIN — SODIUM CHLORIDE SCH MLS/HR: 900 INJECTION, SOLUTION INTRAVENOUS at 08:16

## 2021-12-22 RX ADMIN — CISATRACURIUM BESYLATE SCH MLS/HR: 10 INJECTION INTRAVENOUS at 22:57

## 2021-12-22 RX ADMIN — SODIUM CHLORIDE SCH MLS/HR: 900 INJECTION, SOLUTION INTRAVENOUS at 04:46

## 2021-12-22 RX ADMIN — LACTULOSE SCH GM: 20 SOLUTION ORAL at 22:28

## 2021-12-22 RX ADMIN — CLEVIPIDINE SCH MLS/HR: 0.5 EMULSION INTRAVENOUS at 22:59

## 2021-12-22 RX ADMIN — CLEVIPIDINE SCH MLS/HR: 0.5 EMULSION INTRAVENOUS at 16:37

## 2021-12-22 RX ADMIN — ALBUTEROL SULFATE SCH PUFF: 90 AEROSOL, METERED RESPIRATORY (INHALATION) at 20:26

## 2021-12-22 RX ADMIN — CHLORHEXIDINE GLUCONATE SCH ML: 1.2 RINSE ORAL at 09:46

## 2021-12-22 RX ADMIN — Medication SCH MG: at 09:47

## 2021-12-22 RX ADMIN — CHLORHEXIDINE GLUCONATE SCH ML: 1.2 RINSE ORAL at 22:28

## 2021-12-22 RX ADMIN — ALBUTEROL SULFATE SCH PUFF: 90 AEROSOL, METERED RESPIRATORY (INHALATION) at 11:52

## 2021-12-22 RX ADMIN — SODIUM CHLORIDE SCH MLS/HR: 900 INJECTION, SOLUTION INTRAVENOUS at 15:35

## 2021-12-22 RX ADMIN — FUROSEMIDE SCH MG: 10 INJECTION, SOLUTION INTRAMUSCULAR; INTRAVENOUS at 09:48

## 2021-12-22 RX ADMIN — CISATRACURIUM BESYLATE SCH MLS/HR: 10 INJECTION INTRAVENOUS at 02:39

## 2021-12-22 RX ADMIN — CLEVIPIDINE SCH: 0.5 EMULSION INTRAVENOUS at 03:09

## 2021-12-22 RX ADMIN — INSULIN ASPART SCH UNIT: 100 INJECTION, SOLUTION INTRAVENOUS; SUBCUTANEOUS at 17:46

## 2021-12-22 RX ADMIN — METOCLOPRAMIDE SCH MG: 5 INJECTION, SOLUTION INTRAMUSCULAR; INTRAVENOUS at 16:59

## 2021-12-22 RX ADMIN — METOCLOPRAMIDE SCH MG: 5 INJECTION, SOLUTION INTRAMUSCULAR; INTRAVENOUS at 12:39

## 2021-12-22 RX ADMIN — LEVOTHYROXINE SODIUM SCH MCG: 50 TABLET ORAL at 05:46

## 2021-12-22 RX ADMIN — CITALOPRAM HYDROBROMIDE SCH MG: 20 TABLET ORAL at 09:47

## 2021-12-22 RX ADMIN — FUROSEMIDE SCH MG: 10 INJECTION, SOLUTION INTRAMUSCULAR; INTRAVENOUS at 22:29

## 2021-12-22 RX ADMIN — DEXTRAN 70 AND HYPROMELLOSE 2910 SCH DROPS: 1; 3 SOLUTION/ DROPS OPHTHALMIC at 12:38

## 2021-12-22 RX ADMIN — CLEVIPIDINE SCH MLS/HR: 0.5 EMULSION INTRAVENOUS at 18:11

## 2021-12-22 RX ADMIN — SODIUM CHLORIDE SCH MLS/HR: 900 INJECTION, SOLUTION INTRAVENOUS at 22:01

## 2021-12-22 RX ADMIN — PIPERACILLIN AND TAZOBACTAM SCH: 3; .375 INJECTION, POWDER, FOR SOLUTION INTRAVENOUS at 03:10

## 2021-12-22 RX ADMIN — ENOXAPARIN SODIUM SCH MG: 40 INJECTION SUBCUTANEOUS at 09:48

## 2021-12-22 RX ADMIN — CLEVIPIDINE SCH MLS/HR: 0.5 EMULSION INTRAVENOUS at 12:40

## 2021-12-22 NOTE — P.PN
Subjective


Progress Note Date: 12/22/21








History of present illness


56-year-old  male with past medical history of fibromyalgia comes in to

emergency room with symptoms of nausea, vomiting, dizziness and sweating feeling

weak in with runny nose loss of taste and hence now for the past 1 week.  He 

presented on 12/7 was found to have positive COVID results.  Received his 

monoclonal antibodies.  He Came back to the emergency room because of worsening 

of symptoms.Vitals reviewed patient afebrile pulse 80 respiratory rate 20 blood 

pressure 138/82 saturating at 88% on 10 L labs reviewed WBC 6.6 hemoglobin 13.9 

d-dimer is elevated at 0.89, sodium 135 bicarb 21 BUN 16 creatinine 1.3 glucose 

187 ferritin 2822 AST 89 ALT 80 alkaline phosphatase 225 LDH 1091 and CRP 21 and

albumin 3.3


X-ray suggestive of bilateral patchy pneumonia.  Both interstitial and airspace 

infiltrate


Pulmonary clinic consulted for COVID pneumonia


Patient placed on Lovenox 40 subcu twice a day, vitamin C, D and zinc.  

Dexamethasone initiated at 6 mg twice a day IV


Patient is a candidate of Baritinib and will discuss about initiating it with 

pulmonary


Venous Doppler ordered





12/10: [facility].  He continues to have 

dyspnea and is on 15 L high flow nasal cannula and nonrebreather with pulse ox 

of 87 and 95%.  He's been afebrile, heart rate in the 60s and 70s, blood 

pressure 127/73.  Pro-calcitonin came back high at 9.75 and we started the 

patient on Zosyn and vancomycin for bacterial pneumonia coverage.  Patient has 

coarse crackles bilaterally.  No lower extremity edema.  He has been seen and 

followed by pulmonary medicine.  Patient is not a candidate for Baricitinib.  

Patient is continued on Decadron, Lovenox and vitamin supplements.





12/11: Is found sitting up in the edge of the bed.  He is currently not on a 

Ventimask.  However his pulse oxing still around 88 to 91 % on Ventimask or 15 L

high flow nasal cannula. Patient continues to have dyspnea with activity and 

speaking. Patient states that he is feeling much better compared to yesterday.  

Continues to have a cough.  No lower extremity edema.  He has rhonchi to the 

bases bilaterally.  He is currently on Decadron Lovenox and vitamin supplements.

 Patient remains afebrile, heart rate 62, respirations 17, blood pressure 118/68

pulse ox 93% on 15 L high flow nasal cannula





12/12: Patient was up to the bathroom showering today.  Awaiting inflammation 

markers today.  Patient still requires 10 L of O2 with a pulse ox of 90%.  

Patient continues to have crackles to the bilateral bases.  Dyspnea with 

activity and speaking.  Patient states he is feeling better however he is very 

tired.  Continues to have cough no lower extremity edema.  He still currently on

Decadron, Lovenox, and vitamin supplements.  Patient remains afebrile, heart 

rate 58, respirations 17, blood pressure 118/89,





12/13: Patient is still on high flow nasal cannula 15 L and nonrebreather with 

pulse ox of 89 and 99%.  He's been afebrile, heart rate 73, blood pressure 

120/72.  Creatinine 1.06.  Blood sugars running between 101 and 106.  Sputum 

culture is in progress.  Patient is followed closely by pulmonary medicine.  

Patient is continued on dexamethasone, Lovenox, Zosyn and vitamin supplements.  

Patient has been encouraged to prone several hours daily.





12/14: Patient remains on nonrebreather mask and AirVo at 60 L, FiO2 of 85 with 

pulse ox of 88%.  Appears to have more difficulty with breathing.  Repeat chest 

x-ray reveals stable bilateral infiltrates.  He has been afebrile, heart rate 

59, blood pressure 102/59.  Repeat d-dimer 1.53.  Blood sugars are well 

controlled.





12/15: Patient remains on nonrebreather and AirVo with pulse ox at 90%.  Patient

was agreeable.  He has been afebrile, heart rate 70, blood pressure 131/79.  CBG

running between 97 and 116 and CBGs and insulin will be discontinued.  Sputum 

cultures positive for Candida albicans.  Patient is increasing his activity, 

currently laying in bed on his side.  Lab work including inflammatory markers 

ordered for tomorrow.





12/16: Patient had a drop in his pulse ox and 78% with nonrebreather and AirVo 

and A-Team was called.  Patient stated that his shortness of breath was better 

than the night before.  Stat chest x-ray was done which revealed bilateral 

multifocal peripheral increased opacities consistent with Covid 19 infection are

redemonstrated.  No significant change.  Patient transitioned to BiPAP with 

pulse ox of 90%.


Patient verbalized that he wanted to be a no CODE STATUS and was encouraged to 

discuss with family.  It was determined the patient will be transferred to the 

intensive care unit most likely be intubated.  Patient was very resistant but 

after long discussion with Dr. Austin wheeler, patient agreed to intubation and

full CODE STATUS at this point.  Repeat blood work reveals WBC 9.4, hemoglobin 

12.7, platelet count 263.  D-dimer 1.31.  Blood sugars





12/17: Patient was transferred into the intensive care unit yesterday and 

subsequently intubated and placed on mechanical ventilation, tidal volume 450, 

FiO2 100, PEEP of 20.  Patient was prone this morning switched over to supine.  

He was started on tube feedings yesterday but had a high residual this morning 

after pronating.  He is currently on fentanyl, propofol, Nimbex drip.  One amp 

of bicarbonate given this morning.


Chest x-ray reveals lateral multifocal confluent and increased opacities 

greatest in the periphery consistent with Covid 19 infection redemonstrated.  No

sicca be unchanged from one day earlier.  


CTA of the chest done yesterday reveals suboptimal study without saddle central 

pulmonary embolism.  Cannot exclude smaller pulmonary emboli.  Bilateral 

multifocal and confluent groundglass opacities and organizing consolidations 

consistent with known Covid 19 infection.


Repeat blood work reveals WBC 13.1, hemoglobin 13.1, platelet count 381.  D-

dimer 1.17.  Sodium 135, potassium 5.2, chloride 104, CO2 24, BUN 29 and 

creatinine 0.78.  Capillary blood glucose running between 116 and 150s.  AST 

216.  C-reactive protein 7.9, pro-calcitonin 0.15.





12/18: This is day #9 of admission, patient remains in ICU, still mechanically 

ventilated for hypoxemia, starting 12/17/2021, on Dickerson assist control, rate 32,

PEEP of 20, FiO2 100%, dexamethasone 6 mg daily, electrolytes are stable, BUN 29

creatinine 0.84, x-ray shows bilateral multifocal opacities, consistent with 

Covid pneumonia, CT angiogram negative for PE, patient is on propofol, fentanyl 

and index vital HP, at 20 mL an hour 





12/19: Patient remains in ICU, intubated, NG tube in place, chest x-ray shows no

pneumothorax, no pleural effusion, there is bilateral patchy pulmonary 

interstitial infiltrates, with coalescent density in the left lower lobe.  No 

significant change from yesterdayALT iscreatinine is 0.75, ALT is trending 

downward, C reactive protein is 8.0 from a previous of 8.9Lasix given today, on 

dexamethasone, and  remains sedated.





12/20: Patient remains in the intensive care unit, intubated and on mechanical 

ventilation with FiO2 60% and PEEP of 20 with pulse ox of 87%.  Patient is being

prone for 16 hours per day.  He is tolerating tube feedings.  He has been 

started on lactulose this morning for constipation.  He has been afebrile, heart

rate 75, respiratory rate in the low 30s, blood pressure 148/65.  Patient was 

started on Clevidipine gtt. he is also on propofol, fentanyl and Nimbex.  No new

concerns, patient seems to be fairly stable.





12/21: Patient remains in intensive care unit intubated and on mechanical 

ventilation with FiO2 of 65, tidal volume 450, PEEP of 20.  Patient is being 

managed by the intensivist.  Patient has not been prone today due to skin 

breakdown on his face.  He is on tube feedings and tolerating.  Patient has been

afebrile, heart rate 102, blood pressure 151/71, pulse ox 85-89%.  Repeat blood 

work reveals WBC 13.5, hemoglobin 12.2.  Electrolytes normal.  BUN 47 creatinine

1.05.  .  C-reactive protein 19.8.  D-dimer 1.09.  The patient did not 

have a bowel movement after lactulose yesterday which is been repeated and 

Dulcolax suppository ordered for today.  





12/22, patient remains in ICU, intubated and mechanically vented, is starting to

have skin excoriations, in the face and with deep tissue injury chin area, from 

pronating position, for that reason, they have avoided pronating since 

yesterday.  Patient's currently sedated, and is receiving fentanyl, and nimbex 

clevipres. ngt tolerated at 50 cch/r vital hp, inflammatory markers are still 

elevated, with LDH of 58, CRP of 19, still with bowel issues, they have 

increased lactulose to 20 mg twice a day, and Dulcolax suppository daily.  No 

plans for  weaning off the vent trials at this time, probably might end up in a 

trach PEG .








ROS


Unable to obtain due to intubation





Physical exam


General Appearance: Patient is laying in the ICU bed, intubated and on 

mechanical ventilation.  No acute distress.  Patient appears to be comfortable. 


Full examination deferred to intensive this due to intubation and Covid 19.





Assessment and plan


1. Acute hypoxic respiratory failure secondary to Covid 19 pneumonia.  Consult 

with pulmonary medicine appreciated.  Patient was intubated and placed on 

mechanical ventilation 12/16.  Continue care in the intensive care unit, 

proning, currently on fentanyl, propofol and Nimbex.





2. Acute COVID pneumonia. Dexamethasone 6 mg IV  once a day,  Lovenox 40 subcu 

daily, Not a candidate for Baricitinib, Monitor inflammatory markers. Continue 

supplements vitamin C, D and zinc





3. Acute transaminitis Secondary to viral inflammation. Continue monitoring





4. CKD stage 3.  Continue to monitor patient's creatinine.  No acute kidney 

injury





5. History of fibromyalgia. continue gabapentin 800 3 times a day, Citalopram 40

mg by mouth daily





6. Insomnia. Was on Eszopiclone 3 mg daily at bedtime 





7. Hypothyroidism. Levothyroxine 50 g by mouth daily





8.  Hypertension.  Patient started on  Clevidipine gtt. 





9.  DVT prophylaxis. Lovenox 40 subcu daily.





10. GI prophylaxis.  Protonix 40 mg IV push daily





11.  Hyperglycemia secondary to steroids, IV drips, patient is on NovoLog scale.





12.  Constipation.  Lactulose 20 g daily and Vioxx suppository today.





Prognosis guarded.





Status: Full code





Discharge Plan: 


To be determined.


                                 Intake & Output











 12/20/21 12/21/21 12/22/21 12/23/21





 06:59 06:59 06:59 06:59


 


Intake Total 2829.543 2908.489 3161.023 869.874


 


Output Total 2600 2410 3385 1515


 


Balance 229.543 498.489 -223.977 -645.126


 


Weight  121 kg 122 kg 








                               Current Medications





Albuterol Sulfate (Albuterol Hfa Inhaler)  2 puff INHALATION RT-TID [facility]


   Last Admin: 12/22/21 11:52 Dose:  2 puff


   Documented by: 


Artificial Tears (Artificial Tears-Hypromellose Drops 15 Ml Btl)  1 drops BOTH 

EYES Q4H [facility]


   Last Admin: 12/22/21 12:38 Dose:  1 drops


   Documented by: 


Ascorbic Acid (Ascorbic Acid 500 Mg Tab)  1,000 mg PO DAILY [facility]


   Last Admin: 12/22/21 09:46 Dose:  1,000 mg


   Documented by: 


Bisacodyl (Bisacodyl 10 Mg Supp)  10 mg RECTAL DAILY [facility]


   Last Admin: 12/22/21 09:47 Dose:  10 mg


   Documented by: 


Chlorhexidine Gluconate (Chlorhexidine Gluconate 15 Ml Cup)  15 ml MUCOUS MEM 

BID [facility]


   Last Admin: 12/22/21 09:46 Dose:  15 ml


   Documented by: 


Cholecalciferol (Cholecalciferol 25 Mcg (1000 Iu) Tablet)  25 mcg PO DAILY [facility]


   Last Admin: 12/22/21 09:47 Dose:  25 mcg


   Documented by: 


Citalopram Hydrobromide (Citalopram Hydrobromide 20 Mg Tab)  40 mg PO DAILY [facility]


   Last Admin: 12/22/21 09:47 Dose:  40 mg


   Documented by: 


Dexamethasone Sodium Phosphate (Dexamethasone Sod Phosphate 10 Mg/Ml 1 Ml Vial) 

6 mg IVP DAILY [facility]


   Last Admin: 12/22/21 09:47 Dose:  6 mg


   Documented by: 


Enoxaparin Sodium (Enoxaparin 40 Mg/0.4 Ml Syringe)  40 mg SQ DAILY [facility]


   Last Admin: 12/22/21 09:48 Dose:  40 mg


   Documented by: 


Furosemide (Furosemide 10 Mg/Ml 4 Ml Vial)  40 mg IV Q12HR [facility]


   Last Admin: 12/22/21 09:48 Dose:  40 mg


   Documented by: 


Gabapentin (Gabapentin 400 Mg Cap)  800 mg PO TID [facility]


   Last Admin: 12/22/21 09:46 Dose:  800 mg


   Documented by: 


Propofol 1,000 mg/ IV Solution  100 mls @ 0 mls/hr IV .Q0M [facility]; Protocol


   Last Admin: 12/22/21 11:52 Dose:  50 mcg/kg/min, 36.6 mls/hr


   Documented by: 


Fentanyl Citrate 1,000 mcg/ (Sodium Chloride)  100 mls @ 5.557 mls/hr IV .Q18H 

[facility]; Protocol


   Last Admin: 12/22/21 11:48 Dose:  2.5 mcg/kg/hr, 27.783 mls/hr


   Documented by: 


Cisatracurium Besylate 200 mg/ (Sodium Chloride)  200 mls @ 6.668 mls/hr IV 

.Q24H [facility]; Protocol


   Last Admin: 12/22/21 02:39 Dose:  3 mcg/kg/min, 20.003 mls/hr


   Documented by: 


Sodium Chloride (Saline 0.9%)  1,000 mls @ 20 mls/hr IV .Q24H [facility]


   Last Admin: 12/21/21 21:07 Dose:  20 mls/hr


   Documented by: 


Clevidipine 25 mg/ IV Solution  50 mls @ 2 mls/hr IV .Q24H [facility]; Protocol


   Last Admin: 12/22/21 12:40 Dose:  8 mg/hr, 16 mls/hr


   Documented by: 


Insulin Aspart (Insulin Aspart (Novolog) 100 Unit/Ml Vial)  0 unit SQ Q6H [facility]; 

Protocol


   Last Admin: 12/22/21 12:39 Dose:  3 unit


   Documented by: 


Lactulose (Lactulose 20 Gm/30 Ml Cup)  20 gm PO BID [facility]


Levothyroxine Sodium (Levothyroxine 50 Mcg Tab)  50 mcg PO 0630 [facility]


   Last Admin: 12/22/21 05:46 Dose:  50 mcg


   Documented by: 


Metoclopramide HCl (Metoclopramide 5 Mg/Ml 2 Ml Vial)  10 mg IVP Q6HR [facility]


   Last Admin: 12/22/21 12:39 Dose:  10 mg


   Documented by: 


Pantoprazole Sodium (Pantoprazole 40 Mg/10 Ml Vial)  40 mg IVP DAILY [facility]


   Last Admin: 12/22/21 09:48 Dose:  40 mg


   Documented by: 


Zinc Sulfate (Zinc Sulfate 220 Mg Cap)  220 mg PO DAILY [facility]


   Last Admin: 12/22/21 09:47 Dose:  220 mg


   Documented by: 





                       Laboratory Results - Last 24 Hours











  12/21/21 12/21/21 12/22/21





  17:34 23:44 04:00


 


WBC    14.9 H


 


RBC    4.22 L


 


Hgb    12.8 L


 


Hct    40.5


 


MCV    96.0


 


MCH    30.2


 


MCHC    31.5


 


RDW    13.6


 


Plt Count    449


 


MPV    9.0


 


Neutrophils %    90


 


Lymphocytes %    4


 


Monocytes %    6


 


Eosinophils %    0


 


Basophils %    0


 


Neutrophils #    13.3 H


 


Lymphocytes #    0.5 L


 


Monocytes #    0.8


 


Eosinophils #    0.1


 


Basophils #    0.0


 


Hypochromasia    Slight


 


Sample Site   


 


ABG pH   


 


ABG pCO2   


 


ABG pO2   


 


ABG HCO3   


 


ABG Total CO2   


 


ABG O2 Saturation   


 


ABG Base Excess   


 


[place] Test   


 


FiO2   


 


Sodium   


 


Potassium   


 


Chloride   


 


Carbon Dioxide   


 


Anion Gap   


 


BUN   


 


Creatinine   


 


Est GFR (CKD-EPI)AfAm   


 


Est GFR (CKD-EPI)NonAf   


 


Glucose   


 


POC Glucose (mg/dL)  137 H  135 H 


 


POC Glu Operater Joie Radford Anil 


 


Calcium   


 


Total Bilirubin   


 


AST   


 


ALT   


 


Alkaline Phosphatase   


 


Total Protein   


 


Albumin   














  12/22/21 12/22/21 12/22/21





  04:00 05:19 07:53


 


WBC   


 


RBC   


 


Hgb   


 


Hct   


 


MCV   


 


MCH   


 


MCHC   


 


RDW   


 


Plt Count   


 


MPV   


 


Neutrophils %   


 


Lymphocytes %   


 


Monocytes %   


 


Eosinophils %   


 


Basophils %   


 


Neutrophils #   


 


Lymphocytes #   


 


Monocytes #   


 


Eosinophils #   


 


Basophils #   


 


Hypochromasia   


 


Sample Site    [place]


 


ABG pH    7.36


 


ABG pCO2    66 H


 


ABG pO2    61 L


 


ABG HCO3    37 H


 


ABG Total CO2    39 H


 


ABG O2 Saturation    91.5 L


 


ABG Base Excess    11.5


 


[place] Test    Yes


 


FiO2    65


 


Sodium  140  


 


Potassium  5.0  


 


Chloride  102  


 


Carbon Dioxide  36 H  


 


Anion Gap  2  


 


BUN  50 H  


 


Creatinine  1.01  


 


Est GFR (CKD-EPI)AfAm  >90  


 


Est GFR (CKD-EPI)NonAf  82  


 


Glucose  138 H  


 


POC Glucose (mg/dL)   124 H 


 


POC Glu Operater ID    


 


Calcium  8.8  


 


Total Bilirubin  0.7  


 


AST  41  


 


ALT  119 H  


 


Alkaline Phosphatase  129 H  


 


Total Protein  6.3  


 


Albumin  2.7 L  














  12/22/21 12/22/21





  10:12 12:04


 


WBC  


 


RBC  


 


Hgb  


 


Hct  


 


MCV  


 


MCH  


 


MCHC  


 


RDW  


 


Plt Count  


 


MPV  


 


Neutrophils %  


 


Lymphocytes %  


 


Monocytes %  


 


Eosinophils %  


 


Basophils %  


 


Neutrophils #  


 


Lymphocytes #  


 


Monocytes #  


 


Eosinophils #  


 


Basophils #  


 


Hypochromasia  


 


Sample Site  


 


ABG pH  


 


ABG pCO2  


 


ABG pO2  


 


ABG HCO3  


 


ABG Total CO2  


 


ABG O2 Saturation  


 


ABG Base Excess  


 


[place] Test  


 


FiO2  


 


Sodium  


 


Potassium  


 


Chloride  


 


Carbon Dioxide  


 


Anion Gap  


 


BUN  


 


Creatinine  


 


Est GFR (CKD-EPI)AfAm  


 


Est GFR (CKD-EPI)NonAf  


 


Glucose  


 


POC Glucose (mg/dL)  154 H  170 H


 


POC Glu Operater ID  Ameya, Silvia  Christi, Nguyen


 


Calcium  


 


Total Bilirubin  


 


AST  


 


ALT  


 


Alkaline Phosphatase  


 


Total Protein  


 


Albumin  











Objective





- Vital Signs


Vital signs: 


                                   Vital Signs











Temp  98.9 F   12/22/21 12:00


 


Pulse  98   12/22/21 14:00


 


Resp  32 H  12/22/21 14:00


 


BP  144/81   12/22/21 14:00


 


Pulse Ox  88 L  12/22/21 14:00








                                 Intake & Output











 12/21/21 12/22/21 12/22/21





 18:59 06:59 18:59


 


Intake Total 9956.122 1111.857 869.874


 


Output Total 1850 1535 1515


 


Balance -204.834 -19.143 -645.126


 


Weight 121 kg 122 kg 


 


Intake:   


 


   253 161


 


    Normal Saline 0.9 39 33 21





    Pressure Bag @ 3mL/hr   


 


    Sodium Chloride 0.9% 1, 260 220 140





    000 ml @ 20 mls/hr IV .   





    Q24H [facility] Rx#:[id number]   


 


  Intake, IV Titration 1151.166 917.857 483.874





  Amount   


 


    Cisatracurium 200 mg In 327.630 166.358 





    Sodium Chloride 0.9% 180   





    ml @ 1 MCG/KG/MIN 6.668   





    mls/hr IV .Q24H [facility] Rx#:   





    [id number]   


 


    Clevidipine Butyrate 25 86 66.934 95.601





    mg In Empty Bag 1 bag @ 1   





    MG/HR 2 mls/hr IV .Q24H   





    [facility] Rx#:[id number]   


 


    fentaNYL (PF). 1,000 mcg 351.436 286.165 195.408





    In Sodium Chloride 0.9%   





    80 ml @ 0.5 MCG/KG/HR 5.   





    557 mls/hr IV .Q18H [facility]   





    Rx#:[id number]   


 


    propofoL 1,000 mg In 386.1 398.4 192.865





    Empty Bag 1 bag @ Titrate   





    IV .Q0M [facility] Rx#:   





    [id number]   


 


  Tube Feeding 195 165 105


 


  Other  180 120


 


Output:   


 


  Urine 1850 1535 1515


 


Other:   


 


  Voiding Method Indwelling Catheter Indwelling Catheter 








                       ABP, PAP, CO, CI - Last Documented











Arterial Blood Pressure        148/68

















- Labs


CBC & Chem 7: 


                                 12/22/21 04:00





                                 12/22/21 04:00


Labs: 


                  Abnormal Lab Results - Last 24 Hours (Table)











  12/21/21 12/21/21 12/22/21 Range/Units





  17:34 23:44 04:00 


 


WBC    14.9 H  (3.8-10.6)  k/uL


 


RBC    4.22 L  (4.30-5.90)  m/uL


 


Hgb    12.8 L  (13.0-17.5)  gm/dL


 


Neutrophils #    13.3 H  (1.3-7.7)  k/uL


 


Lymphocytes #    0.5 L  (1.0-4.8)  k/uL


 


ABG pCO2     (35-45)  mmHg


 


ABG pO2     ()  mmHg


 


ABG HCO3     (21-25)  mmol/L


 


ABG Total CO2     (19-24)  mmol/L


 


ABG O2 Saturation     (94-97)  %


 


Carbon Dioxide     (22-30)  mmol/L


 


BUN     (9-20)  mg/dL


 


Glucose     (74-99)  mg/dL


 


POC Glucose (mg/dL)  137 H  135 H   (75-99)  mg/dL


 


ALT     (4-49)  U/L


 


Alkaline Phosphatase     ()  U/L


 


Albumin     (3.5-5.0)  g/dL














  12/22/21 12/22/21 12/22/21 Range/Units





  04:00 05:19 07:53 


 


WBC     (3.8-10.6)  k/uL


 


RBC     (4.30-5.90)  m/uL


 


Hgb     (13.0-17.5)  gm/dL


 


Neutrophils #     (1.3-7.7)  k/uL


 


Lymphocytes #     (1.0-4.8)  k/uL


 


ABG pCO2    66 H  (35-45)  mmHg


 


ABG pO2    61 L  ()  mmHg


 


ABG HCO3    37 H  (21-25)  mmol/L


 


ABG Total CO2    39 H  (19-24)  mmol/L


 


ABG O2 Saturation    91.5 L  (94-97)  %


 


Carbon Dioxide  36 H    (22-30)  mmol/L


 


BUN  50 H    (9-20)  mg/dL


 


Glucose  138 H    (74-99)  mg/dL


 


POC Glucose (mg/dL)   124 H   (75-99)  mg/dL


 


ALT  119 H    (4-49)  U/L


 


Alkaline Phosphatase  129 H    ()  U/L


 


Albumin  2.7 L    (3.5-5.0)  g/dL














  12/22/21 12/22/21 Range/Units





  10:12 12:04 


 


WBC    (3.8-10.6)  k/uL


 


RBC    (4.30-5.90)  m/uL


 


Hgb    (13.0-17.5)  gm/dL


 


Neutrophils #    (1.3-7.7)  k/uL


 


Lymphocytes #    (1.0-4.8)  k/uL


 


ABG pCO2    (35-45)  mmHg


 


ABG pO2    ()  mmHg


 


ABG HCO3    (21-25)  mmol/L


 


ABG Total CO2    (19-24)  mmol/L


 


ABG O2 Saturation    (94-97)  %


 


Carbon Dioxide    (22-30)  mmol/L


 


BUN    (9-20)  mg/dL


 


Glucose    (74-99)  mg/dL


 


POC Glucose (mg/dL)  154 H  170 H  (75-99)  mg/dL


 


ALT    (4-49)  U/L


 


Alkaline Phosphatase    ()  U/L


 


Albumin    (3.5-5.0)  g/dL








                      Microbiology - Last 24 Hours (Table)











 12/21/21 14:55 Gram Stain - Preliminary





 Sputum Sputum Culture - Preliminary

## 2021-12-22 NOTE — P.PN
Subjective


Progress Note Date: 12/22/21


Principal diagnosis: 


 Dyspnea, hypoxia, COVID-19





This is a very pleasant 56-year-old male patient with a known history of 

fibromyalgia.  Nonsmoker.  Approximate 1 week ago he developed symptoms of 

nausea vomiting diarrhea dizziness and sweating.  He presented here on 

12/07/2021 and has a positive for COVID-19 and received monoclonal antibodies.  

He came back to the emergency room last evening with worsening symptoms.  Chest 

x-ray reveals bilateral patchy interstitial and airspace infiltrates.  He is 

currently requiring 10 L high flow nasal cannula to maintain O2 saturations in 

the high 80s low 90s.  White count 6.6.  Hemoglobin 13.9.  Lymphocytes 0.3.  

Sodium 135.  Potassium 4.1.  Bicarb 21.  Creatinine 1.31.  Glucose 187.  

Ferritin 2822.  AST 89.  ALT 80.  LDH 1091.  C-reactive protein 21.0.  Initiated

on Decadron. 





On 12/10/2021 patient seen in follow-up on medical surgical floor, he is awake 

and alert, in no acute distress, overnight he wore 15 L high flow and n

onrebreather mask, he is currently off the nonrebreather mask and not he is on 

15 L high flow nasal cannula and he is maintaining O2 saturations at 93%, 

afebrile, hemodynamically stable.  However his oxygen requirements have 

significantly increased in last 24 hours.  His pro calcitonin level was sig

nificantly elevated at 9.75, and patient is not a candidate for Baricitinib in 

view of possibility of underlying bacterial infection.  Patient states she is 

coughing and bringing up some greenish colored phlegm at times, he is being 

started on Zosyn and vancomycin empirically, we will order blood cultures, 

sputum culture and urine cultures.  Clinically he looks fairly comfortable, no 

distress, lung sounds are revealing coarse diffuse rhonchi and rales.  No 

altered mentation, no fever.  He continues on Lovenox 40 mg twice daily, and 

Decadron 6 mg twice daily.











56-year-old male, with a history of coronavirus associated pneumonia.  The 

patient was admitted to the hospital on 12/09/2021 for COVID 19 related 

pneumonia and hypoxic respiratory failure.  He has not been vaccinated.  He has 

associated monoclonal antibodies on outpatient basis.  Despite that, the 

patient's condition progressed.  The patient developed worsening hypoxemic 

respiratory failure, and was intubated and mechanically ventilated for 

hypoxemia, on 12/16/2021. .  He remains on the mechanical ventilator. 


On today's evaluation, the patient remains sedated and paralyzed.  The patient 

is currently, he's on volume assist control, rate 32, tidal volume 450, FiO2 

65%, and PEEP of 20.  Noted the patient was able to wean down his FiO2 after he 

was being prone.  He is being prone on a daily basis.  His last morning was per

formed at 10 PM last night.  The patient currently is down to an FiO2 of 60% 

with a PEEP of 20 and his current pulse ox of 92%.  The peak air pressure on a 

mechanical ventilator 35 with a static pressure of 33.  The chest x-ray was 

showing diffuse bilateral pulmonary infiltrates and ET tube is in a good 

location.  No evidence of any pneumothorax.  Note that this is yesterday chest 

x-ray and today's chest x-ray is still pending is the chest x-ray was postponed 

due to his prone body positioning.  On his blood gas, the patient has a pH of 

7.42 with a pCO2 of 50 and pO2 of 64 and this was done and FiO2 of 65%.  He did 

improve his oxygenation after being prone.  He remains on Decadron 6 mg IV once 

a day.  CT angiogram that was done at time of admission showed no evidence of 

any pulmonary embolism.  The patient remains on Lovenox 40 mg subcu for DVT 

prophylaxis.  The inflammatory markers include LDH level of 349 which is up 

considerably.  The patient also has a pro-calcitonin level of 0.15, note that 

his initial pro-calcitonin level was quite elevated at 9.7 and for that reason 

the patient was placed on empiric antibiotic coverage.  No other 

immunosuppressive agents have been utilizing this patient.  His sputum came back

positive for Candida albicans and the blood culture came back negative.  The 

patient is currently receiving enteral feeding for nutritional support and his 

using vital high protein at the rate of 20 mL an hour.  IV fluids are currently 

in the form of normal saline at the rate of 20 mL an hour and the fluid balance 

is up considerably at least 11 kg weight gain since admission.  The patient has 

been a positive fluid balance and he would definitely benefit from diuresis. 





On 12/21/2021 patient seen in follow-up in the intensive care unit, he is 

currently supine, sedated, paralyzed, intubated.  He is on assist-control mode 

of ventilation with a rate of 32, tidal volume is 450, FiO2 of 65% and PEEP of 

20.  This morning's blood gas shows pO2 of 6 3, pCO2 of 60, and pH of 7.35 and 

this was done on FiO2 of 65.  His pulse ox is 85%.  He is currently on 0.9 norm

al saline better to 20 ML per hour, Diprivan is at 50 mics per kilo per minute, 

fat note is a 2.5 mics per kilo per minute, Cleviprex is 4 mg/h, Nimbex is a 2.5

mics per kilo per minute.  He is tolerating his tube feedings that are infusing 

at 15 ML per hour, standard water flushes.  Today's chest x-ray shows bilateral 

multifocal and confluent increased opacities greatest in the periphery consiste

nt with COVID-19 infection, no significant change compared one day earlier.  

Patient is on IV Decadron 6 more gram daily, yesterday we started IV Lasix 40 mg

every 12 hours, he is on prophylactic dose of Lovenox, COVID-19 multi-vitamins. 

he is on Protonix for GI prophylaxis, we added Reglan, and lactulose.  Patient 

was also given a suppository.  Patient has not had a stool yet, abdomen is 

obese, distended but nontender.  He is still in positive fluid balance overall 

since admission an average of 10 kg, however his weight is down 2 kg in the last

24 hours.  Patient is afebrile.  Today's labs have been reviewed showing white 

blood cell count fairly stable at 13.5, hemoglobin of 12.2, his d-dimer is 

slightly improved and is down to 1.09, electrolytes are within normal limits, 

BUN is 47, creatinine is 1.05, his LDH is 564, CRP is increased and is at 19.8. 

Patient was being placed in prone positioning on average of 16 hours daily 

however yesterday patient was noted to have developed deep tissue injury on his 

chin, and for that reason patient was not proned since yesterday.





On 12/22/2021 patient seen in follow-up in the intensive care unit, he remains 

intubated, sedated and paralyzed on assist-control mode of ventilation with a 

rate of 35, tidal M is 450, FiO2 65% and PEEP of 20, his peak air pressure is 

35, plateau pressure is 40.  This morning's blood gas shows pO2 of 61, pCO2 of 

66, and pH of 7.36, this was done on the above-mentioned vent settings.  Today's

chest x-ray has been reviewed showing stable diffuse bilateral infiltrates, and 

interval development of subcutaneous emphysema involving the soft tissues of the

neck, there is small amount of pneumomediastinum not excluded.  She is currently

on Diprivan at 50 mics per kilo per minute, fentanyl at 2.5 mics per kilo per 

minute, Nimbex is at 3 mics per kilo per minute, Cleviprex of the 5 mg per hour.

 He is tolerating tube feedings with vital HP at a rate of 15 with standard 

water flushes.  No fever or chills overnight, he is not requiring any 

vasopressor support, he is actually been a bit hypertensive requiring Cleviprex 

infusion, currently his blood pressure is better controlled and is 153/68 with a

mean of 92.  Patient has not been prone for last 48 hours related to development

of deep tissue injury on his face.  Today's labs show white blood cell count of 

14.9, hemoglobin of 12.8, platelet count is 449, sodium is 140, potassium is 

5.0, chloride is 102, CO2 36, B1 is 50, creatinine is 1.01, his AST is 41, ALT 

is 119, alk phos is 129.  His last set of inflammatory markers from yesterday 

showed LDH of 564, and CRP of 19.8, today's set of inflammatory markers is still

pending.  Patient has been receiving lactulose and Reglan, he has not had a 

bowel movement in several days, and we will increase his lactulose, she also 

remains on Lasix 40 mg twice daily however she is still does daily in negative f

luid balance over the last 24 hours of -223 mL. 

















Objective





- Vital Signs


Vital signs: 


                                   Vital Signs











Temp  98.5 F   12/22/21 04:00


 


Pulse  99   12/22/21 07:00


 


Resp  32 H  12/22/21 07:00


 


BP  158/58   12/22/21 07:00


 


Pulse Ox  89 L  12/22/21 07:00








                                 Intake & Output











 12/21/21 12/22/21 12/22/21





 18:59 06:59 18:59


 


Intake Total 7957.223 3740.857 275.140


 


Output Total 1850 1535 125


 


Balance -204.834 -19.143 150.140


 


Weight 121 kg 122 kg 


 


Intake:   


 


   253 23


 


    Normal Saline 0.9 39 33 3





    Pressure Bag @ 3mL/hr   


 


    Sodium Chloride 0.9% 1, 260 220 20





    000 ml @ 20 mls/hr IV .   





    Q24H [facility] Rx#:[id number]   


 


  Intake, IV Titration 1151.166 917.857 237.140





  Amount   


 


    Cisatracurium 200 mg In 327.630 166.358 





    Sodium Chloride 0.9% 180   





    ml @ 1 MCG/KG/MIN 6.668   





    mls/hr IV .Q24H [facility] Rx#:   





    [id number]   


 


    Clevidipine Butyrate 25 86 66.934 47.034





    mg In Empty Bag 1 bag @ 1   





    MG/HR 2 mls/hr IV .Q24H   





    [facility] Rx#:[id number]   


 


    fentaNYL (PF). 1,000 mcg 351.436 286.165 97.241





    In Sodium Chloride 0.9%   





    80 ml @ 0.5 MCG/KG/HR 5.   





    557 mls/hr IV .Q18H [facility]   





    Rx#:[id number]   


 


    propofoL 1,000 mg In 386.1 398.4 92.865





    Empty Bag 1 bag @ Titrate   





    IV .Q0M [facility] Rx#:   





    [id number]   


 


  Tube Feeding 195 165 15


 


  Other  180 


 


Output:   


 


  Urine 1850 1535 125


 


Other:   


 


  Voiding Method Indwelling Catheter Indwelling Catheter 








                       ABP, PAP, CO, CI - Last Documented











Arterial Blood Pressure        145/67

















- Exam


 GENERAL EXAM: Intubated, sedated and paralyzed 56-year-old white male, on isai

t-control mode of ventilation with a rate of 32, tidal volume 450, FiO2 65% and 

PEEP of 20


HEAD: Normocephalic/atraumatic. Area of deep tissue injury on chin


EYES: Normal reaction of pupils, equal size.  Conjunctiva pink, sclera white.


NOSE: Clear with pink turbinates.


THROAT: No erythema or exudates.


NECK: No masses, no JVD, no thyroid enlargement, no adenopathy.


CHEST: No chest wall deformity.  Symmetrical expansion. Left subclavian central 

line in place


LUNGS: Equal air entry with diffuse crackles, and rhonchi, no wheezing


CVS: Regular rate and rhythm, normal S1 and S2, no gallops, no murmurs, no rubs


ABDOMEN: Soft, nontender.  No hepatosplenomegaly, normal bowel sounds, no 

guarding or rigidity.


EXTREMITIES: No clubbing, no edema, no cyanosis, 2+ pulses and upper and lower 

extremities.


MUSCULOSKELETAL: Muscle strength and tone normal.


SPINE: No scoliosis or deformity


SKIN: No rashes


CENTRAL NERVOUS SYSTEM: Sedated, paralyzed, and intubated No focal deficits, 

tone is normal in all 4 extremities.











- Labs


CBC & Chem 7: 


                                 12/22/21 04:00





                                 12/22/21 04:00


Labs: 


                  Abnormal Lab Results - Last 24 Hours (Table)











  12/21/21 12/21/21 12/21/21 Range/Units





  05:55 11:31 12:35 


 


WBC     (3.8-10.6)  k/uL


 


RBC     (4.30-5.90)  m/uL


 


Hgb     (13.0-17.5)  gm/dL


 


Neutrophils #     (1.3-7.7)  k/uL


 


Lymphocytes #     (1.0-4.8)  k/uL


 


ABG pH    7.32 L  (7.35-7.45)  


 


ABG pCO2  60 H   66 H  (35-45)  mmHg


 


ABG pO2  63 L   60 L  ()  mmHg


 


ABG HCO3  33 H   34 H  (21-25)  mmol/L


 


ABG Total CO2  35 H   36 H  (19-24)  mmol/L


 


ABG O2 Saturation  91.6 L   90.0 L  (94-97)  %


 


Carbon Dioxide     (22-30)  mmol/L


 


BUN     (9-20)  mg/dL


 


Glucose     (74-99)  mg/dL


 


POC Glucose (mg/dL)   163 H   (75-99)  mg/dL


 


ALT     (4-49)  U/L


 


Alkaline Phosphatase     ()  U/L


 


Albumin     (3.5-5.0)  g/dL














  12/21/21 12/21/21 12/22/21 Range/Units





  17:34 23:44 04:00 


 


WBC    14.9 H  (3.8-10.6)  k/uL


 


RBC    4.22 L  (4.30-5.90)  m/uL


 


Hgb    12.8 L  (13.0-17.5)  gm/dL


 


Neutrophils #    13.3 H  (1.3-7.7)  k/uL


 


Lymphocytes #    0.5 L  (1.0-4.8)  k/uL


 


ABG pH     (7.35-7.45)  


 


ABG pCO2     (35-45)  mmHg


 


ABG pO2     ()  mmHg


 


ABG HCO3     (21-25)  mmol/L


 


ABG Total CO2     (19-24)  mmol/L


 


ABG O2 Saturation     (94-97)  %


 


Carbon Dioxide     (22-30)  mmol/L


 


BUN     (9-20)  mg/dL


 


Glucose     (74-99)  mg/dL


 


POC Glucose (mg/dL)  137 H  135 H   (75-99)  mg/dL


 


ALT     (4-49)  U/L


 


Alkaline Phosphatase     ()  U/L


 


Albumin     (3.5-5.0)  g/dL














  12/22/21 12/22/21 12/22/21 Range/Units





  04:00 05:19 07:53 


 


WBC     (3.8-10.6)  k/uL


 


RBC     (4.30-5.90)  m/uL


 


Hgb     (13.0-17.5)  gm/dL


 


Neutrophils #     (1.3-7.7)  k/uL


 


Lymphocytes #     (1.0-4.8)  k/uL


 


ABG pH     (7.35-7.45)  


 


ABG pCO2    66 H  (35-45)  mmHg


 


ABG pO2    61 L  ()  mmHg


 


ABG HCO3    37 H  (21-25)  mmol/L


 


ABG Total CO2    39 H  (19-24)  mmol/L


 


ABG O2 Saturation    91.5 L  (94-97)  %


 


Carbon Dioxide  36 H    (22-30)  mmol/L


 


BUN  50 H    (9-20)  mg/dL


 


Glucose  138 H    (74-99)  mg/dL


 


POC Glucose (mg/dL)   124 H   (75-99)  mg/dL


 


ALT  119 H    (4-49)  U/L


 


Alkaline Phosphatase  129 H    ()  U/L


 


Albumin  2.7 L    (3.5-5.0)  g/dL








                      Microbiology - Last 24 Hours (Table)











 12/21/21 14:55 Gram Stain - Preliminary





 Sputum Sputum Culture - Preliminary














Assessment and Plan


Plan: 


 Assessment:





#1.  Acute hypoxic respiratory failure secondary to acute COVID-19 pneumonia.  

Patient is status post monoclonal antibody infusion on 12/07/2021.  Patient was 

not a candidate for Remdesivir related to severe hypoxic respiratory failure on 

presentation requiring 10 L high flow nasal cannula.  Today he is on 15 L and 

100% nonrebreather mask, his FiO2 requirements have increased but patient is not

a candidate for Baricitinib related to possibility of underlying bacterial 

infection.  Patient was transferred to the intensive care unit on 12/16/2021 and

intubated on 12/16/2021.  On 12/22/2021 patient remains sedated, and paralyzed, 

on assist-control mode of ventilation with a rate of 32, tidal volume is 450, 

FiO2 of 65% and PEEP of 20





#2.  Elevated pro-calcitonin, improved, blood and sputum cultures have shown no 

growth, patient has received empiric Zosyn, pro-calcitonin level has improved 

and is down to 0.17, Zosyn has been discontinued





#3.  Elevated inflammatory markers related to acute COVID-19 pneumonia, improved





#4.  Elevated LFTs related to COVID-19 pneumonia, slightly worsened





#5.  Elevated d-dimer, venous Doppler of bilateral lower extremities was 

negative for DVT, CT angiogram was a limited study but showed no central 

pulmonary embolism





#6.  History of fibromyalgia





#7.  Depression





#8.  Never smoker





#9.  Acute kidney injury possibly related to Covid 19 related ATN, improved





#10.  Hypothyroidism





#11.  Hyperkalemia, mild, improved





#12.  Deep tissue injury on the chin from proning position, currently proning 

has been stopped





#13.  Pneumomediastinum with subcu emphysema in the neck





Plan:





Chest x-ray has been reviewed, there is interval development of subcu emphysema,

and possible pneumomediastinum


Continue current vent settings


Continue sedation and Nimbex


Continue fentanyl for discomfort


Continue current dose Lasix 40 mg twice daily


Continue Cleviprex for hypertension


Continue nutritional support


Increase Lactulose to twice daily


Continue Reglan


Follow up inflammatory markers and d-dimer


daily CXR, CBC, CMP, ABGs


Overall prognosis is guarded





I performed a history & physical examination of the patient and discussed their 

management with my nurse practitioner, Lissy Brody.  I reviewed the nurse 

practitioner's note and agree with the documented findings and plan of care.  

Lung sounds are positive for dim breath sounds throughout the lung fields.  The 

findings and the impression was discussed with the patient.  I attest to the 

documentation by the nurse practitioner. 








Time with Patient: Greater than 30

## 2021-12-22 NOTE — XR
EXAMINATION TYPE: XR chest 1V portable

 

DATE OF EXAM: 12/22/2021

 

COMPARISON: 12/21/2021

 

HISTORY: Tube placement

 

TECHNIQUE: Single frontal view of the chest is obtained.

 

FINDINGS:  Stable endotracheal and orogastric tubes. Stable left subclavian central venous catheter. 
Persistent bilateral multifocal and confluent increased opacities greatest in the periphery redemonst
rated. Cardiac silhouette size is stable and within normal limits. Osseous structures are intact. [place]
ateral upper thoracic subcutaneous air is redemonstrated and appears increased. Could not exclude a s
mall amount of pneumomediastinum..

 

 

IMPRESSION:  Stable diffuse bilateral infiltrates. There is interval increase in subcutaneous emphyse
ma involving the soft tissues of the neck. Small amount of pneumomediastinum not excluded correlate c
linically.

## 2021-12-23 LAB
ALBUMIN SERPL-MCNC: 2.5 G/DL (ref 3.5–5)
ALP SERPL-CCNC: 109 U/L (ref 38–126)
ALT SERPL-CCNC: 107 U/L (ref 4–49)
ANION GAP SERPL CALC-SCNC: 3 MMOL/L
AST SERPL-CCNC: 44 U/L (ref 17–59)
BASOPHILS # BLD AUTO: 0 K/UL (ref 0–0.2)
BASOPHILS NFR BLD AUTO: 0 %
BUN SERPL-SCNC: 50 MG/DL (ref 9–20)
CALCIUM SPEC-MCNC: 8.7 MG/DL (ref 8.4–10.2)
CHLORIDE SERPL-SCNC: 102 MMOL/L (ref 98–107)
CO2 BLDA-SCNC: 42 MMOL/L (ref 19–24)
CO2 SERPL-SCNC: 38 MMOL/L (ref 22–30)
EOSINOPHIL # BLD AUTO: 0.1 K/UL (ref 0–0.7)
EOSINOPHIL NFR BLD AUTO: 1 %
ERYTHROCYTE [DISTWIDTH] IN BLOOD BY AUTOMATED COUNT: 3.91 M/UL (ref 4.3–5.9)
ERYTHROCYTE [DISTWIDTH] IN BLOOD: 14.2 % (ref 11.5–15.5)
GLUCOSE BLD-MCNC: 101 MG/DL (ref 75–99)
GLUCOSE BLD-MCNC: 134 MG/DL (ref 75–99)
GLUCOSE BLD-MCNC: 154 MG/DL (ref 75–99)
GLUCOSE BLD-MCNC: 166 MG/DL (ref 75–99)
GLUCOSE SERPL-MCNC: 109 MG/DL (ref 74–99)
HCO3 BLDA-SCNC: 39 MMOL/L (ref 21–25)
HCT VFR BLD AUTO: 37.9 % (ref 39–53)
HGB BLD-MCNC: 11.7 GM/DL (ref 13–17.5)
LYMPHOCYTES # SPEC AUTO: 0.8 K/UL (ref 1–4.8)
LYMPHOCYTES NFR SPEC AUTO: 7 %
MCH RBC QN AUTO: 29.9 PG (ref 25–35)
MCHC RBC AUTO-ENTMCNC: 30.9 G/DL (ref 31–37)
MCV RBC AUTO: 96.8 FL (ref 80–100)
MONOCYTES # BLD AUTO: 0.7 K/UL (ref 0–1)
MONOCYTES NFR BLD AUTO: 6 %
NEUTROPHILS # BLD AUTO: 8.5 K/UL (ref 1.3–7.7)
NEUTROPHILS NFR BLD AUTO: 84 %
PCO2 BLDA: 70 MMHG (ref 35–45)
PH BLDA: 7.36 [PH] (ref 7.35–7.45)
PLATELET # BLD AUTO: 376 K/UL (ref 150–450)
PO2 BLDA: 57 MMHG (ref 83–108)
POTASSIUM SERPL-SCNC: 4.7 MMOL/L (ref 3.5–5.1)
PROT SERPL-MCNC: 5.7 G/DL (ref 6.3–8.2)
SODIUM SERPL-SCNC: 143 MMOL/L (ref 137–145)
WBC # BLD AUTO: 10.2 K/UL (ref 3.8–10.6)

## 2021-12-23 RX ADMIN — DEXTRAN 70 AND HYPROMELLOSE 2910 SCH DROPS: 1; 3 SOLUTION/ DROPS OPHTHALMIC at 09:52

## 2021-12-23 RX ADMIN — DEXTRAN 70 AND HYPROMELLOSE 2910 SCH DROPS: 1; 3 SOLUTION/ DROPS OPHTHALMIC at 20:21

## 2021-12-23 RX ADMIN — ALBUTEROL SULFATE SCH PUFF: 90 AEROSOL, METERED RESPIRATORY (INHALATION) at 11:12

## 2021-12-23 RX ADMIN — INSULIN ASPART SCH UNIT: 100 INJECTION, SOLUTION INTRAVENOUS; SUBCUTANEOUS at 18:38

## 2021-12-23 RX ADMIN — CITALOPRAM HYDROBROMIDE SCH MG: 20 TABLET ORAL at 09:54

## 2021-12-23 RX ADMIN — METOCLOPRAMIDE SCH MG: 5 INJECTION, SOLUTION INTRAMUSCULAR; INTRAVENOUS at 05:44

## 2021-12-23 RX ADMIN — ALBUTEROL SULFATE SCH PUFF: 90 AEROSOL, METERED RESPIRATORY (INHALATION) at 07:50

## 2021-12-23 RX ADMIN — Medication SCH MCG: at 09:54

## 2021-12-23 RX ADMIN — GABAPENTIN SCH MG: 400 CAPSULE ORAL at 20:45

## 2021-12-23 RX ADMIN — DEXTRAN 70 AND HYPROMELLOSE 2910 SCH DROPS: 1; 3 SOLUTION/ DROPS OPHTHALMIC at 13:54

## 2021-12-23 RX ADMIN — Medication SCH MG: at 09:59

## 2021-12-23 RX ADMIN — ENOXAPARIN SODIUM SCH MG: 80 INJECTION SUBCUTANEOUS at 20:45

## 2021-12-23 RX ADMIN — GABAPENTIN SCH MG: 400 CAPSULE ORAL at 18:38

## 2021-12-23 RX ADMIN — GABAPENTIN SCH MG: 400 CAPSULE ORAL at 09:59

## 2021-12-23 RX ADMIN — LACTULOSE SCH GM: 20 SOLUTION ORAL at 09:56

## 2021-12-23 RX ADMIN — ALBUTEROL SULFATE SCH: 90 AEROSOL, METERED RESPIRATORY (INHALATION) at 11:20

## 2021-12-23 RX ADMIN — DEXTROSE SCH MG: 50 INJECTION, SOLUTION INTRAVENOUS at 09:54

## 2021-12-23 RX ADMIN — SODIUM CHLORIDE SCH MLS/HR: 900 INJECTION, SOLUTION INTRAVENOUS at 21:21

## 2021-12-23 RX ADMIN — CLEVIPIDINE SCH MLS/HR: 0.5 EMULSION INTRAVENOUS at 05:35

## 2021-12-23 RX ADMIN — DEXTRAN 70 AND HYPROMELLOSE 2910 SCH DROPS: 1; 3 SOLUTION/ DROPS OPHTHALMIC at 23:34

## 2021-12-23 RX ADMIN — INSULIN ASPART SCH UNIT: 100 INJECTION, SOLUTION INTRAVENOUS; SUBCUTANEOUS at 23:46

## 2021-12-23 RX ADMIN — INSULIN ASPART SCH UNIT: 100 INJECTION, SOLUTION INTRAVENOUS; SUBCUTANEOUS at 13:48

## 2021-12-23 RX ADMIN — METOCLOPRAMIDE SCH MG: 5 INJECTION, SOLUTION INTRAMUSCULAR; INTRAVENOUS at 13:52

## 2021-12-23 RX ADMIN — CHLORHEXIDINE GLUCONATE SCH ML: 1.2 RINSE ORAL at 20:44

## 2021-12-23 RX ADMIN — FUROSEMIDE SCH MG: 10 INJECTION, SOLUTION INTRAMUSCULAR; INTRAVENOUS at 20:44

## 2021-12-23 RX ADMIN — PANTOPRAZOLE SODIUM SCH MG: 40 INJECTION, POWDER, FOR SOLUTION INTRAVENOUS at 09:56

## 2021-12-23 RX ADMIN — CISATRACURIUM BESYLATE SCH MLS/HR: 10 INJECTION INTRAVENOUS at 08:56

## 2021-12-23 RX ADMIN — FUROSEMIDE SCH MG: 10 INJECTION, SOLUTION INTRAMUSCULAR; INTRAVENOUS at 09:55

## 2021-12-23 RX ADMIN — LEVOTHYROXINE SODIUM SCH MCG: 50 TABLET ORAL at 05:45

## 2021-12-23 RX ADMIN — CEFAZOLIN SCH MLS/HR: 330 INJECTION, POWDER, FOR SOLUTION INTRAMUSCULAR; INTRAVENOUS at 21:24

## 2021-12-23 RX ADMIN — LACTULOSE SCH GM: 20 SOLUTION ORAL at 20:44

## 2021-12-23 RX ADMIN — CHLORHEXIDINE GLUCONATE SCH ML: 1.2 RINSE ORAL at 09:54

## 2021-12-23 RX ADMIN — SODIUM CHLORIDE SCH MLS/HR: 900 INJECTION, SOLUTION INTRAVENOUS at 04:52

## 2021-12-23 RX ADMIN — METOCLOPRAMIDE SCH MG: 5 INJECTION, SOLUTION INTRAMUSCULAR; INTRAVENOUS at 18:38

## 2021-12-23 RX ADMIN — OXYCODONE HYDROCHLORIDE AND ACETAMINOPHEN SCH MG: 500 TABLET ORAL at 09:53

## 2021-12-23 RX ADMIN — INSULIN ASPART SCH: 100 INJECTION, SOLUTION INTRAVENOUS; SUBCUTANEOUS at 05:45

## 2021-12-23 RX ADMIN — SODIUM CHLORIDE SCH MLS/HR: 900 INJECTION, SOLUTION INTRAVENOUS at 15:00

## 2021-12-23 RX ADMIN — METOCLOPRAMIDE SCH MG: 5 INJECTION, SOLUTION INTRAMUSCULAR; INTRAVENOUS at 23:36

## 2021-12-23 RX ADMIN — SODIUM CHLORIDE SCH MLS/HR: 900 INJECTION, SOLUTION INTRAVENOUS at 11:46

## 2021-12-23 RX ADMIN — DEXTRAN 70 AND HYPROMELLOSE 2910 SCH DROPS: 1; 3 SOLUTION/ DROPS OPHTHALMIC at 18:37

## 2021-12-23 RX ADMIN — SODIUM CHLORIDE SCH MLS/HR: 900 INJECTION, SOLUTION INTRAVENOUS at 17:33

## 2021-12-23 RX ADMIN — CLEVIPIDINE SCH MLS/HR: 0.5 EMULSION INTRAVENOUS at 23:46

## 2021-12-23 RX ADMIN — SODIUM CHLORIDE SCH MLS/HR: 900 INJECTION, SOLUTION INTRAVENOUS at 01:32

## 2021-12-23 RX ADMIN — CISATRACURIUM BESYLATE SCH MLS/HR: 10 INJECTION INTRAVENOUS at 18:41

## 2021-12-23 RX ADMIN — CEFEPIME HYDROCHLORIDE SCH MLS/HR: 2 INJECTION, POWDER, FOR SOLUTION INTRAVENOUS at 21:25

## 2021-12-23 RX ADMIN — DEXTRAN 70 AND HYPROMELLOSE 2910 SCH DROPS: 1; 3 SOLUTION/ DROPS OPHTHALMIC at 03:08

## 2021-12-23 RX ADMIN — SODIUM CHLORIDE SCH MLS/HR: 900 INJECTION, SOLUTION INTRAVENOUS at 08:56

## 2021-12-23 RX ADMIN — ALBUTEROL SULFATE SCH PUFF: 90 AEROSOL, METERED RESPIRATORY (INHALATION) at 20:38

## 2021-12-23 RX ADMIN — CEFEPIME HYDROCHLORIDE SCH MLS/HR: 2 INJECTION, POWDER, FOR SOLUTION INTRAVENOUS at 13:53

## 2021-12-23 RX ADMIN — ENOXAPARIN SODIUM SCH MG: 40 INJECTION SUBCUTANEOUS at 09:55

## 2021-12-23 NOTE — P.PN
Subjective


Progress Note Date: 12/23/21


Principal diagnosis: 





Acute hypoxic history failure secondary to COVID-19 pneumonia





On 12/22/2021 patient seen in follow-up in the intensive care unit, he remains 

intubated, sedated and paralyzed on assist-control mode of ventilation with a 

rate of 35, tidal M is 450, FiO2 65% and PEEP of 20, his peak air pressure is 

35, plateau pressure is 40.  This morning's blood gas shows pO2 of 61, pCO2 of 

66, and pH of 7.36, this was done on the above-mentioned vent settings.  Today's

chest x-ray has been reviewed showing stable diffuse bilateral infiltrates, and 

interval development of subcutaneous emphysema involving the soft tissues of the

neck, there is small amount of pneumomediastinum not excluded.  She is currently

on Diprivan at 50 mics per kilo per minute, fentanyl at 2.5 mics per kilo per 

minute, Nimbex is at 3 mics per kilo per minute, Cleviprex of the 5 mg per hour.

 He is tolerating tube feedings with vital HP at a rate of 15 with standard 

water flushes.  No fever or chills overnight, he is not requiring any 

vasopressor support, he is actually been a bit hypertensive requiring Cleviprex 

infusion, currently his blood pressure is better controlled and is 153/68 with a

mean of 92.  Patient has not been prone for last 48 hours related to development

of deep tissue injury on his face.  Today's labs show white blood cell count of 

14.9, hemoglobin of 12.8, platelet count is 449, sodium is 140, potassium is 

5.0, chloride is 102, CO2 36, B1 is 50, creatinine is 1.01, his AST is 41, ALT 

is 119, alk phos is 129.  His last set of inflammatory markers from yesterday 

showed LDH of 564, and CRP of 19.8, today's set of inflammatory markers is still

pending.  Patient has been receiving lactulose and Reglan, he has not had a 

bowel movement in several days, and we will increase his lactulose, she also 

remains on Lasix 40 mg twice daily however she is still does daily in negative 

fluid balance over the last 24 hours of -223 mL. 





Very today on 12/23/21, patient remains in the ICU, intubated and mechanically 

ventilated.  Patient is on assist control rate of 32 tidal volume 450 FiO2 70% 

PEEP is at 20.  ABG is marginal although the patient is on relatively high FiO2 

and high PEEP.  Patient has a pO2 of 57 pCO2 of 70 pH of 7.36.  His electrolytes

are normal renal profile showed a BUN of 50 creatinine 0.95.  WBC count is 10.7 

hemoglobin is 11.7.  Chest x-ray continues to show bilateral interstitial 

infiltrates consistent with COVID-19 pneumonia.  Patient is on multiple drips 

including Cleviprex at 3 mg per hour, he is on Cardizem at 15 mg per hour 

propofol at 50 fentanyl 3 mcg/kg/h Nimbex at 3 mcg/kg/m.  He is on 0.9 normal 

saline at KVO.  Yesterday the patient developed atrial fibrillation with RVR, 

and today I increased his Lovenox to 80 mg subcu twice a day, converted to sinus

rhythm and early this morning.  Patient is on enteral feeding.  It is up to 

goal.  Overall clinical status is extremely marginal.  Remains on cefepime 

empirically.  Remains on the COVID-19 cocktail.  Lovenox was increased to 80 mg 

subcu twice a day, Azopt Protonix 40 mg IV push daily.  And he is also on zinc. 

Lasix is given at 40 mg IV push every 12 hours.

















Objective





- Vital Signs


Vital signs: 


                                   Vital Signs











Temp  98.7 F   12/23/21 04:00


 


Pulse  147 H  12/23/21 07:00


 


Resp  32 H  12/23/21 07:00


 


BP  106/63   12/23/21 05:00


 


Pulse Ox  86 L  12/23/21 07:00








                                 Intake & Output











 12/22/21 12/23/21 12/23/21





 18:59 06:59 18:59


 


Intake Total 4874.746 2148.607 526.137


 


Output Total 1915 1670 60


 


Balance -234.395 35.607 466.137


 


Weight  121.5 kg 121.5 kg


 


Intake:   


 


   276 23


 


    Normal Saline 0.9 36 36 3





    Pressure Bag @ 3mL/hr   


 


    Sodium Chloride 0.9% 1, 240 240 20





    000 ml @ 20 mls/hr IV .   





    Q24H [facility] Rx#:[id number]   


 


  Intake, IV Titration 5087.661 1443.607 488.137





  Amount   


 


    Cisatracurium 200 mg In 200 179.027 199.697





    Sodium Chloride 0.9% 180   





    ml @ 1 MCG/KG/MIN 6.668   





    mls/hr IV .Q24H [facility] Rx#:   





    [id number]   


 


    Clevidipine Butyrate 25 170.668 137.133 0





    mg In Empty Bag 1 bag @ 1   





    MG/HR 2 mls/hr IV .Q24H   





    [facility] Rx#:[id number]   


 


    Diltiazem 125 mg In  41.250 





    Sodium Chloride 0.9% 100   





    ml @ 5 MG/HR 5 mls/hr IV   





    .Q24H [facility] Rx#:[id number]   


 


    fentaNYL (PF). 1,000 mcg 381.072 289.407 288.44





    In Sodium Chloride 0.9%   





    80 ml @ 0.5 MCG/KG/HR 5.   





    557 mls/hr IV .Q18H [facility]   





    Rx#:[id number]   


 


    propofoL 1,000 mg In 292.865 467.79 





    Empty Bag 1 bag @ Titrate   





    IV .Q0M [facility] Rx#:   





    [id number]   


 


  Tube Feeding 180 180 15


 


  Other 180 135 


 


Output:   


 


  Urine 1915 1670 60


 


Other:   


 


  Voiding Method Indwelling Catheter Indwelling Catheter 








                       ABP, PAP, CO, CI - Last Documented











Arterial Blood Pressure        140/76

















- Exam





GENERAL EXAM: Revealed 56-year-old white male intubated mechanically ventilated,

sedated and paralyzed.


HEAD: Normocephalic/atraumatic. 


EYES: PERRLA, MI, nonicteric, no neck masses


NOSE: Clear with pink turbinates.


THROAT: No erythema or exudates.


NECK: No masses, no JVD, no thyroid enlargement, no adenopathy.


CHEST: Symmetrical chest expansion, crackles at the bases.


CVS: Regular rate and rhythm, normal S1 and S2, no gallops, no murmurs, no rubs


ABDOMEN: Soft, nontender.  No hepatosplenomegaly, normal bowel sounds, no 

guarding or rigidity.


EXTREMITIES: No clubbing, trace of bipedal edema, no cyanosis, 2+ pulses and 

upper and lower extremities.


SKIN: No rashes


CENTRAL NERVOUS SYSTEM: Sedated, paralyzed, unable to assess.








- Labs


CBC & Chem 7: 


                                 12/23/21 04:00





                                 12/23/21 04:00


Labs: 


                  Abnormal Lab Results - Last 24 Hours (Table)











  12/22/21 12/22/21 12/22/21 Range/Units





  17:43 22:35 23:18 


 


RBC     (4.30-5.90)  m/uL


 


Hgb     (13.0-17.5)  gm/dL


 


Hct     (39.0-53.0)  %


 


MCHC     (31.0-37.0)  g/dL


 


Neutrophils #     (1.3-7.7)  k/uL


 


Lymphocytes #     (1.0-4.8)  k/uL


 


ABG pCO2     (35-45)  mmHg


 


ABG pO2     ()  mmHg


 


ABG HCO3     (21-25)  mmol/L


 


ABG Total CO2     (19-24)  mmol/L


 


ABG O2 Saturation     (94-97)  %


 


Carbon Dioxide     (22-30)  mmol/L


 


BUN     (9-20)  mg/dL


 


Glucose     (74-99)  mg/dL


 


POC Glucose (mg/dL)  141 H   134 H  (75-99)  mg/dL


 


Magnesium   2.6 H   (1.6-2.3)  mg/dL


 


ALT     (4-49)  U/L


 


Total Protein     (6.3-8.2)  g/dL


 


Albumin     (3.5-5.0)  g/dL














  12/23/21 12/23/21 12/23/21 Range/Units





  04:00 04:00 05:39 


 


RBC  3.91 L    (4.30-5.90)  m/uL


 


Hgb  11.7 L    (13.0-17.5)  gm/dL


 


Hct  37.9 L    (39.0-53.0)  %


 


MCHC  30.9 L    (31.0-37.0)  g/dL


 


Neutrophils #  8.5 H    (1.3-7.7)  k/uL


 


Lymphocytes #  0.8 L    (1.0-4.8)  k/uL


 


ABG pCO2     (35-45)  mmHg


 


ABG pO2     ()  mmHg


 


ABG HCO3     (21-25)  mmol/L


 


ABG Total CO2     (19-24)  mmol/L


 


ABG O2 Saturation     (94-97)  %


 


Carbon Dioxide   38 H   (22-30)  mmol/L


 


BUN   50 H   (9-20)  mg/dL


 


Glucose   109 H   (74-99)  mg/dL


 


POC Glucose (mg/dL)    101 H  (75-99)  mg/dL


 


Magnesium     (1.6-2.3)  mg/dL


 


ALT   107 H   (4-49)  U/L


 


Total Protein   5.7 L   (6.3-8.2)  g/dL


 


Albumin   2.5 L   (3.5-5.0)  g/dL














  12/23/21 12/23/21 Range/Units





  05:42 13:37 


 


RBC    (4.30-5.90)  m/uL


 


Hgb    (13.0-17.5)  gm/dL


 


Hct    (39.0-53.0)  %


 


MCHC    (31.0-37.0)  g/dL


 


Neutrophils #    (1.3-7.7)  k/uL


 


Lymphocytes #    (1.0-4.8)  k/uL


 


ABG pCO2  70 H   (35-45)  mmHg


 


ABG pO2  57 L*   ()  mmHg


 


ABG HCO3  39 H   (21-25)  mmol/L


 


ABG Total CO2  42 H   (19-24)  mmol/L


 


ABG O2 Saturation  88.6 L   (94-97)  %


 


Carbon Dioxide    (22-30)  mmol/L


 


BUN    (9-20)  mg/dL


 


Glucose    (74-99)  mg/dL


 


POC Glucose (mg/dL)   166 H  (75-99)  mg/dL


 


Magnesium    (1.6-2.3)  mg/dL


 


ALT    (4-49)  U/L


 


Total Protein    (6.3-8.2)  g/dL


 


Albumin    (3.5-5.0)  g/dL








                      Microbiology - Last 24 Hours (Table)











 12/21/21 14:55 Gram Stain - Preliminary





 Sputum Sputum Culture - Preliminary





    Presumptive Staph aureus





    Candida albicans














Assessment and Plan


Assessment: 





Impression:


Acute hypoxic respiratory failure secondary COVID-19 pneumonia, patient received

monoclonal antibody infusion on 12/7, was not a candidate for Remdesivir the 

cause of his acute hypoxic respiratory failure, not a candidate for Baricitinib 

because of underlying bacterial infection possibility.  Patient was admitted to 

the ICU on 12/16 and intubated on 12/16 remains intubated and mechanically 

ventilated.  Patient is developing a picture of ARDS.  This is ARDS secondary to

COVID-19 pneumonia.


Elevated pro calcitonin level, patient was treated empirically with Zosyn 

cultures have been nondiagnostic.


Elevated inflammatory markers second COVID-19 pneumonia


Elevated d-dimer but negative workup for pulmonary embolism


New-onset atrial fibrillation with RVR on 12/22 requiring Cardizem drip and now 

on higher dose of Lovenox.


History of fibromyalgia.


Lifetime nonsmoker.


Acute kidney injury secondary COVID-19 pneumonia improving


History of hypothyroidism


Deep tissue injury to Chin from chronic position, no more prone done.


Pneumomediastinum with subcutaneous emphysema in the neck.  This is a 

complication of COVID-19 infection.





Recommendation: Continue ventilatory support, remains on high PEEP of 20, FiO2 

is high at 70% rate is 3 to tell volume 450.


Continue sedation and paralysis


Continue fentanyl


Continue Lasix 40 mg twice a day


Continue clevidipine


Continue enteral feeding/nutritional support.


Continue lactulose


Continue to monitor inflammatory markers


Continue GI and DVT prophylaxis


Continue to monitor x-rays ABGs and labs on a daily basis.


Overall prognosis remains extremely poor and guarded


Continue COVID-19 cocktail.


Critical care time is over 30 minutes





Time with Patient: Greater than 30

## 2021-12-23 NOTE — P.PN
Subjective


Progress Note Date: 12/23/21








History of present illness


56-year-old  male with past medical history of fibromyalgia comes in to

emergency room with symptoms of nausea, vomiting, dizziness and sweating feeling

weak in with runny nose loss of taste and hence now for the past 1 week.  He 

presented on 12/7 was found to have positive COVID results.  Received his 

monoclonal antibodies.  He Came back to the emergency room because of worsening 

of symptoms.Vitals reviewed patient afebrile pulse 80 respiratory rate 20 blood 

pressure 138/82 saturating at 88% on 10 L labs reviewed WBC 6.6 hemoglobin 13.9 

d-dimer is elevated at 0.89, sodium 135 bicarb 21 BUN 16 creatinine 1.3 glucose 

187 ferritin 2822 AST 89 ALT 80 alkaline phosphatase 225 LDH 1091 and CRP 21 and

albumin 3.3


X-ray suggestive of bilateral patchy pneumonia.  Both interstitial and airspace 

infiltrate


Pulmonary clinic consulted for COVID pneumonia


Patient placed on Lovenox 40 subcu twice a day, vitamin C, D and zinc.  

Dexamethasone initiated at 6 mg twice a day IV


Patient is a candidate of Baritinib and will discuss about initiating it with 

pulmonary


Venous Doppler ordered





12/10: [facility].  He continues to have 

dyspnea and is on 15 L high flow nasal cannula and nonrebreather with pulse ox 

of 87 and 95%.  He's been afebrile, heart rate in the 60s and 70s, blood 

pressure 127/73.  Pro-calcitonin came back high at 9.75 and we started the 

patient on Zosyn and vancomycin for bacterial pneumonia coverage.  Patient has 

coarse crackles bilaterally.  No lower extremity edema.  He has been seen and 

followed by pulmonary medicine.  Patient is not a candidate for Baricitinib.  

Patient is continued on Decadron, Lovenox and vitamin supplements.





12/11: Is found sitting up in the edge of the bed.  He is currently not on a 

Ventimask.  However his pulse oxing still around 88 to 91 % on Ventimask or 15 L

high flow nasal cannula. Patient continues to have dyspnea with activity and 

speaking. Patient states that he is feeling much better compared to yesterday.  

Continues to have a cough.  No lower extremity edema.  He has rhonchi to the 

bases bilaterally.  He is currently on Decadron Lovenox and vitamin supplements.

 Patient remains afebrile, heart rate 62, respirations 17, blood pressure 118/68

pulse ox 93% on 15 L high flow nasal cannula





12/12: Patient was up to the bathroom showering today.  Awaiting inflammation 

markers today.  Patient still requires 10 L of O2 with a pulse ox of 90%.  

Patient continues to have crackles to the bilateral bases.  Dyspnea with 

activity and speaking.  Patient states he is feeling better however he is very 

tired.  Continues to have cough no lower extremity edema.  He still currently on

Decadron, Lovenox, and vitamin supplements.  Patient remains afebrile, heart 

rate 58, respirations 17, blood pressure 118/89,





12/13: Patient is still on high flow nasal cannula 15 L and nonrebreather with 

pulse ox of 89 and 99%.  He's been afebrile, heart rate 73, blood pressure 

120/72.  Creatinine 1.06.  Blood sugars running between 101 and 106.  Sputum 

culture is in progress.  Patient is followed closely by pulmonary medicine.  

Patient is continued on dexamethasone, Lovenox, Zosyn and vitamin supplements.  

Patient has been encouraged to prone several hours daily.





12/14: Patient remains on nonrebreather mask and AirVo at 60 L, FiO2 of 85 with 

pulse ox of 88%.  Appears to have more difficulty with breathing.  Repeat chest 

x-ray reveals stable bilateral infiltrates.  He has been afebrile, heart rate 

59, blood pressure 102/59.  Repeat d-dimer 1.53.  Blood sugars are well 

controlled.





12/15: Patient remains on nonrebreather and AirVo with pulse ox at 90%.  Patient

was agreeable.  He has been afebrile, heart rate 70, blood pressure 131/79.  CBG

running between 97 and 116 and CBGs and insulin will be discontinued.  Sputum 

cultures positive for Candida albicans.  Patient is increasing his activity, 

currently laying in bed on his side.  Lab work including inflammatory markers 

ordered for tomorrow.





12/16: Patient had a drop in his pulse ox and 78% with nonrebreather and AirVo 

and A-Team was called.  Patient stated that his shortness of breath was better 

than the night before.  Stat chest x-ray was done which revealed bilateral 

multifocal peripheral increased opacities consistent with Covid 19 infection are

redemonstrated.  No significant change.  Patient transitioned to BiPAP with 

pulse ox of 90%.


Patient verbalized that he wanted to be a no CODE STATUS and was encouraged to 

discuss with family.  It was determined the patient will be transferred to the 

intensive care unit most likely be intubated.  Patient was very resistant but 

after long discussion with Dr. Austin wheeler, patient agreed to intubation and

full CODE STATUS at this point.  Repeat blood work reveals WBC 9.4, hemoglobin 

12.7, platelet count 263.  D-dimer 1.31.  Blood sugars





12/17: Patient was transferred into the intensive care unit yesterday and 

subsequently intubated and placed on mechanical ventilation, tidal volume 450, 

FiO2 100, PEEP of 20.  Patient was prone this morning switched over to supine.  

He was started on tube feedings yesterday but had a high residual this morning 

after pronating.  He is currently on fentanyl, propofol, Nimbex drip.  One amp 

of bicarbonate given this morning.


Chest x-ray reveals lateral multifocal confluent and increased opacities 

greatest in the periphery consistent with Covid 19 infection redemonstrated.  No

sicca be unchanged from one day earlier.  


CTA of the chest done yesterday reveals suboptimal study without saddle central 

pulmonary embolism.  Cannot exclude smaller pulmonary emboli.  Bilateral 

multifocal and confluent groundglass opacities and organizing consolidations 

consistent with known Covid 19 infection.


Repeat blood work reveals WBC 13.1, hemoglobin 13.1, platelet count 381.  D-

dimer 1.17.  Sodium 135, potassium 5.2, chloride 104, CO2 24, BUN 29 and 

creatinine 0.78.  Capillary blood glucose running between 116 and 150s.  AST 

216.  C-reactive protein 7.9, pro-calcitonin 0.15.





12/18: This is day #9 of admission, patient remains in ICU, still mechanically 

ventilated for hypoxemia, starting 12/17/2021, on Dickerson assist control, rate 32,

PEEP of 20, FiO2 100%, dexamethasone 6 mg daily, electrolytes are stable, BUN 29

creatinine 0.84, x-ray shows bilateral multifocal opacities, consistent with 

Covid pneumonia, CT angiogram negative for PE, patient is on propofol, fentanyl 

and index vital HP, at 20 mL an hour 





12/19: Patient remains in ICU, intubated, NG tube in place, chest x-ray shows no

pneumothorax, no pleural effusion, there is bilateral patchy pulmonary 

interstitial infiltrates, with coalescent density in the left lower lobe.  No 

significant change from yesterdayALT iscreatinine is 0.75, ALT is trending 

downward, C reactive protein is 8.0 from a previous of 8.9Lasix given today, on 

dexamethasone, and  remains sedated.





12/20: Patient remains in the intensive care unit, intubated and on mechanical 

ventilation with FiO2 60% and PEEP of 20 with pulse ox of 87%.  Patient is being

prone for 16 hours per day.  He is tolerating tube feedings.  He has been 

started on lactulose this morning for constipation.  He has been afebrile, heart

rate 75, respiratory rate in the low 30s, blood pressure 148/65.  Patient was 

started on Clevidipine gtt. he is also on propofol, fentanyl and Nimbex.  No new

concerns, patient seems to be fairly stable.





12/21: Patient remains in intensive care unit intubated and on mechanical 

ventilation with FiO2 of 65, tidal volume 450, PEEP of 20.  Patient is being 

managed by the intensivist.  Patient has not been prone today due to skin 

breakdown on his face.  He is on tube feedings and tolerating.  Patient has been

afebrile, heart rate 102, blood pressure 151/71, pulse ox 85-89%.  Repeat blood 

work reveals WBC 13.5, hemoglobin 12.2.  Electrolytes normal.  BUN 47 creatinine

1.05.  .  C-reactive protein 19.8.  D-dimer 1.09.  The patient did not 

have a bowel movement after lactulose yesterday which is been repeated and 

Dulcolax suppository ordered for today.  





12/22, patient remains in ICU, intubated and mechanically vented, is starting to

have skin excoriations, in the face and with deep tissue injury chin area, from 

pronating position, for that reason, they have avoided pronating since 

yesterday.  Patient's currently sedated, and is receiving fentanyl, and nimbex 

clevipres. ngt tolerated at 50 cch/r vital hp, inflammatory markers are still 

elevated, with LDH of 58, CRP of 19, still with bowel issues, they have 

increased lactulose to 20 mg twice a day, and Dulcolax suppository daily.  No 

plans for  weaning off the vent trials at this time, probably might end up in a 

trach PEG .





12/23 patient went into A. fib RVR last night, for which they have started him 

on amiodarone, which did not help, patient therefore was started on Cardizem 

drip, currently at 15 mics, still no bowel movement, despite Dulcolax and 

lactulose, we'll going to do half dose MiraLAX a day, and may repeat in 12 

hours, patient is still no vent, with PEEP of 21





ROS


Unable to obtain due to intubation





Physical exam


General Appearance: Patient is laying in the ICU bed, intubated and on 

mechanical ventilation.  No acute distress.  Patient appears to be comfortable. 


Full examination deferred to intensive this due to intubation and Covid 19.





Assessment and plan


1. Acute hypoxic respiratory failure secondary to Covid 19 pneumonia.  Consult 

with pulmonary medicine appreciated.  Patient was intubated and placed on 

mechanical ventilation 12/16.  Continue care in the intensive care unit, 

proning, currently on fentanyl, propofol and Nimbex.





2. Acute COVID pneumonia. Dexamethasone 6 mg IV  once a day,  Lovenox 40 subcu 

daily, Not a candidate for Baricitinib, Monitor inflammatory markers. Continue 

supplements vitamin C, D and zinc





3. Acute transaminitis Secondary to viral inflammation. Continue monitoring





4. CKD stage 3.  Continue to monitor patient's creatinine.  No acute kidney 

injury





5. History of fibromyalgia. continue gabapentin 800 3 times a day, Citalopram 40

mg by mouth daily





6. Insomnia. Was on Eszopiclone 3 mg daily at bedtime 





7. Hypothyroidism. Levothyroxine 50 g by mouth daily





8.  Hypertension.  Patient started on  Clevidipine gtt. 





9.  DVT prophylaxis. Lovenox 40 subcu daily.





10. GI prophylaxis.  Protonix 40 mg IV push daily





11.  Hyperglycemia secondary to steroids, IV drips, patient is on NovoLog scale.





12.  Constipation.  Lactulose 20 g daily and Vioxx suppository today.





Prognosis guarded.





Status: Full code





Discharge Plan: 


To be determined.


                                 Intake & Output











 12/20/21 12/21/21 12/22/21 12/23/21





 06:59 06:59 06:59 06:59


 


Intake Total 2829.543 2908.489 3161.023 869.874


 


Output Total 2600 1490 3385 1515


 


Balance 229.543 498.489 -223.977 -645.126


 


Weight  121 kg 122 kg 








                               Current Medications





Albuterol Sulfate (Albuterol Hfa Inhaler)  2 puff INHALATION RT-TID [facility]


   Last Admin: 12/22/21 11:52 Dose:  2 puff


   Documented by: 


Artificial Tears (Artificial Tears-Hypromellose Drops 15 Ml Btl)  1 drops BOTH 

EYES Q4H [facility]


   Last Admin: 12/22/21 12:38 Dose:  1 drops


   Documented by: 


Ascorbic Acid (Ascorbic Acid 500 Mg Tab)  1,000 mg PO DAILY [facility]


   Last Admin: 12/22/21 09:46 Dose:  1,000 mg


   Documented by: 


Bisacodyl (Bisacodyl 10 Mg Supp)  10 mg RECTAL DAILY [facility]


   Last Admin: 12/22/21 09:47 Dose:  10 mg


   Documented by: 


Chlorhexidine Gluconate (Chlorhexidine Gluconate 15 Ml Cup)  15 ml MUCOUS MEM 

BID [facility]


   Last Admin: 12/22/21 09:46 Dose:  15 ml


   Documented by: 


Cholecalciferol (Cholecalciferol 25 Mcg (1000 Iu) Tablet)  25 mcg PO DAILY [facility]


   Last Admin: 12/22/21 09:47 Dose:  25 mcg


   Documented by: 


Citalopram Hydrobromide (Citalopram Hydrobromide 20 Mg Tab)  40 mg PO DAILY [facility]


   Last Admin: 12/22/21 09:47 Dose:  40 mg


   Documented by: 


Dexamethasone Sodium Phosphate (Dexamethasone Sod Phosphate 10 Mg/Ml 1 Ml Vial) 

6 mg IVP DAILY [facility]


   Last Admin: 12/22/21 09:47 Dose:  6 mg


   Documented by: 


Enoxaparin Sodium (Enoxaparin 40 Mg/0.4 Ml Syringe)  40 mg SQ DAILY [facility]


   Last Admin: 12/22/21 09:48 Dose:  40 mg


   Documented by: 


Furosemide (Furosemide 10 Mg/Ml 4 Ml Vial)  40 mg IV Q12HR [facility]


   Last Admin: 12/22/21 09:48 Dose:  40 mg


   Documented by: 


Gabapentin (Gabapentin 400 Mg Cap)  800 mg PO TID [facility]


   Last Admin: 12/22/21 09:46 Dose:  800 mg


   Documented by: 


Propofol 1,000 mg/ IV Solution  100 mls @ 0 mls/hr IV .Q0M [facility]; Protocol


   Last Admin: 12/22/21 11:52 Dose:  50 mcg/kg/min, 36.6 mls/hr


   Documented by: 


Fentanyl Citrate 1,000 mcg/ (Sodium Chloride)  100 mls @ 5.557 mls/hr IV .Q18H 

[facility]; Protocol


   Last Admin: 12/22/21 11:48 Dose:  2.5 mcg/kg/hr, 27.783 mls/hr


   Documented by: 


Cisatracurium Besylate 200 mg/ (Sodium Chloride)  200 mls @ 6.668 mls/hr IV .Q2

4H [facility]; Protocol


   Last Admin: 12/22/21 02:39 Dose:  3 mcg/kg/min, 20.003 mls/hr


   Documented by: 


Sodium Chloride (Saline 0.9%)  1,000 mls @ 20 mls/hr IV .Q24H [facility]


   Last Admin: 12/21/21 21:07 Dose:  20 mls/hr


   Documented by: 


Clevidipine 25 mg/ IV Solution  50 mls @ 2 mls/hr IV .Q24H [facility]; Protocol


   Last Admin: 12/22/21 12:40 Dose:  8 mg/hr, 16 mls/hr


   Documented by: 


Insulin Aspart (Insulin Aspart (Novolog) 100 Unit/Ml Vial)  0 unit SQ Q6H [facility]; 

Protocol


   Last Admin: 12/22/21 12:39 Dose:  3 unit


   Documented by: 


Lactulose (Lactulose 20 Gm/30 Ml Cup)  20 gm PO BID [facility]


Levothyroxine Sodium (Levothyroxine 50 Mcg Tab)  50 mcg PO 0630 [facility]


   Last Admin: 12/22/21 05:46 Dose:  50 mcg


   Documented by: 


Metoclopramide HCl (Metoclopramide 5 Mg/Ml 2 Ml Vial)  10 mg IVP Q6HR [facility]


   Last Admin: 12/22/21 12:39 Dose:  10 mg


   Documented by: 


Pantoprazole Sodium (Pantoprazole 40 Mg/10 Ml Vial)  40 mg IVP DAILY [facility]


   Last Admin: 12/22/21 09:48 Dose:  40 mg


   Documented by: 


Zinc Sulfate (Zinc Sulfate 220 Mg Cap)  220 mg PO DAILY [facility]


   Last Admin: 12/22/21 09:47 Dose:  220 mg


   Documented by: 





                       Laboratory Results - Last 24 Hours











  12/21/21 12/21/21 12/22/21





  17:34 23:44 04:00


 


WBC    14.9 H


 


RBC    4.22 L


 


Hgb    12.8 L


 


Hct    40.5


 


MCV    96.0


 


MCH    30.2


 


MCHC    31.5


 


RDW    13.6


 


Plt Count    449


 


MPV    9.0


 


Neutrophils %    90


 


Lymphocytes %    4


 


Monocytes %    6


 


Eosinophils %    0


 


Basophils %    0


 


Neutrophils #    13.3 H


 


Lymphocytes #    0.5 L


 


Monocytes #    0.8


 


Eosinophils #    0.1


 


Basophils #    0.0


 


Hypochromasia    Slight


 


Sample Site   


 


ABG pH   


 


ABG pCO2   


 


ABG pO2   


 


ABG HCO3   


 


ABG Total CO2   


 


ABG O2 Saturation   


 


ABG Base Excess   


 


[place] Test   


 


FiO2   


 


Sodium   


 


Potassium   


 


Chloride   


 


Carbon Dioxide   


 


Anion Gap   


 


BUN   


 


Creatinine   


 


Est GFR (CKD-EPI)AfAm   


 


Est GFR (CKD-EPI)NonAf   


 


Glucose   


 


POC Glucose (mg/dL)  137 H  135 H 


 


POC Glu Operater ID  Joie Foster Austin 


 


Calcium   


 


Total Bilirubin   


 


AST   


 


ALT   


 


Alkaline Phosphatase   


 


Total Protein   


 


Albumin   














  12/22/21 12/22/21 12/22/21





  04:00 05:19 07:53


 


WBC   


 


RBC   


 


Hgb   


 


Hct   


 


MCV   


 


MCH   


 


MCHC   


 


RDW   


 


Plt Count   


 


MPV   


 


Neutrophils %   


 


Lymphocytes %   


 


Monocytes %   


 


Eosinophils %   


 


Basophils %   


 


Neutrophils #   


 


Lymphocytes #   


 


Monocytes #   


 


Eosinophils #   


 


Basophils #   


 


Hypochromasia   


 


Sample Site    [place]


 


ABG pH    7.36


 


ABG pCO2    66 H


 


ABG pO2    61 L


 


ABG HCO3    37 H


 


ABG Total CO2    39 H


 


ABG O2 Saturation    91.5 L


 


ABG Base Excess    11.5


 


[place] Test    Yes


 


FiO2    65


 


Sodium  140  


 


Potassium  5.0  


 


Chloride  102  


 


Carbon Dioxide  36 H  


 


Anion Gap  2  


 


BUN  50 H  


 


Creatinine  1.01  


 


Est GFR (CKD-EPI)AfAm  >90  


 


Est GFR (CKD-EPI)NonAf  82  


 


Glucose  138 H  


 


POC Glucose (mg/dL)   124 H 


 


POC Glu Operater ID    


 


Calcium  8.8  


 


Total Bilirubin  0.7  


 


AST  41  


 


ALT  119 H  


 


Alkaline Phosphatase  129 H  


 


Total Protein  6.3  


 


Albumin  2.7 L  














  12/22/21 12/22/21





  10:12 12:04


 


WBC  


 


RBC  


 


Hgb  


 


Hct  


 


MCV  


 


MCH  


 


MCHC  


 


RDW  


 


Plt Count  


 


MPV  


 


Neutrophils %  


 


Lymphocytes %  


 


Monocytes %  


 


Eosinophils %  


 


Basophils %  


 


Neutrophils #  


 


Lymphocytes #  


 


Monocytes #  


 


Eosinophils #  


 


Basophils #  


 


Hypochromasia  


 


Sample Site  


 


ABG pH  


 


ABG pCO2  


 


ABG pO2  


 


ABG HCO3  


 


ABG Total CO2  


 


ABG O2 Saturation  


 


ABG Base Excess  


 


[place] Test  


 


FiO2  


 


Sodium  


 


Potassium  


 


Chloride  


 


Carbon Dioxide  


 


Anion Gap  


 


BUN  


 


Creatinine  


 


Est GFR (CKD-EPI)AfAm  


 


Est GFR (CKD-EPI)NonAf  


 


Glucose  


 


POC Glucose (mg/dL)  154 H  170 H


 


POC Glu Operater ID  Silvia Hou Caitlin


 


Calcium  


 


Total Bilirubin  


 


AST  


 


ALT  


 


Alkaline Phosphatase  


 


Total Protein  


 


Albumin  














Objective





- Vital Signs


Vital signs: 


                                   Vital Signs











Temp  98.7 F   12/23/21 04:00


 


Pulse  147 H  12/23/21 07:00


 


Resp  32 H  12/23/21 07:00


 


BP  106/63   12/23/21 05:00


 


Pulse Ox  86 L  12/23/21 07:00








                                 Intake & Output











 12/22/21 12/23/21 12/23/21





 18:59 06:59 18:59


 


Intake Total 5867.782 5594.607 426.137


 


Output Total 1915 1670 60


 


Balance -234.395 35.607 366.137


 


Weight  121.5 kg 121.5 kg


 


Intake:   


 


   276 23


 


    Normal Saline 0.9 36 36 3





    Pressure Bag @ 3mL/hr   


 


    Sodium Chloride 0.9% 1, 240 240 20





    000 ml @ 20 mls/hr IV .   





    Q24H [facility] Rx#:[id number]   


 


  Intake, IV Titration 0082.605 1344.607 388.137





  Amount   


 


    Cisatracurium 200 mg In 200 179.027 199.697





    Sodium Chloride 0.9% 180   





    ml @ 1 MCG/KG/MIN 6.668   





    mls/hr IV .Q24H [facility] Rx#:   





    [id number]   


 


    Clevidipine Butyrate 25 170.668 137.133 0





    mg In Empty Bag 1 bag @ 1   





    MG/HR 2 mls/hr IV .Q24H   





    [facility] Rx#:[id number]   


 


    Diltiazem 125 mg In  41.250 





    Sodium Chloride 0.9% 100   





    ml @ 5 MG/HR 5 mls/hr IV   





    .Q24H [facility] Rx#:[id number]   


 


    fentaNYL (PF). 1,000 mcg 381.072 289.407 188.44





    In Sodium Chloride 0.9%   





    80 ml @ 0.5 MCG/KG/HR 5.   





    557 mls/hr IV .Q18H [facility]   





    Rx#:[id number]   


 


    propofoL 1,000 mg In 292.865 467.79 





    Empty Bag 1 bag @ Titrate   





    IV .Q0M [facility] Rx#:   





    [id number]   


 


  Tube Feeding 180 180 15


 


  Other 180 135 


 


Output:   


 


  Urine 1915 1670 60


 


Other:   


 


  Voiding Method Indwelling Catheter Indwelling Catheter 








                       ABP, PAP, CO, CI - Last Documented











Arterial Blood Pressure        140/76

















- Labs


CBC & Chem 7: 


                                 12/23/21 04:00





                                 12/23/21 04:00


Labs: 


                  Abnormal Lab Results - Last 24 Hours (Table)











  12/22/21 12/22/21 12/22/21 Range/Units





  17:43 22:35 23:18 


 


RBC     (4.30-5.90)  m/uL


 


Hgb     (13.0-17.5)  gm/dL


 


Hct     (39.0-53.0)  %


 


MCHC     (31.0-37.0)  g/dL


 


Neutrophils #     (1.3-7.7)  k/uL


 


Lymphocytes #     (1.0-4.8)  k/uL


 


ABG pCO2     (35-45)  mmHg


 


ABG pO2     ()  mmHg


 


ABG HCO3     (21-25)  mmol/L


 


ABG Total CO2     (19-24)  mmol/L


 


ABG O2 Saturation     (94-97)  %


 


Carbon Dioxide     (22-30)  mmol/L


 


BUN     (9-20)  mg/dL


 


Glucose     (74-99)  mg/dL


 


POC Glucose (mg/dL)  141 H   134 H  (75-99)  mg/dL


 


Magnesium   2.6 H   (1.6-2.3)  mg/dL


 


ALT     (4-49)  U/L


 


Total Protein     (6.3-8.2)  g/dL


 


Albumin     (3.5-5.0)  g/dL














  12/23/21 12/23/21 12/23/21 Range/Units





  04:00 04:00 05:39 


 


RBC  3.91 L    (4.30-5.90)  m/uL


 


Hgb  11.7 L    (13.0-17.5)  gm/dL


 


Hct  37.9 L    (39.0-53.0)  %


 


MCHC  30.9 L    (31.0-37.0)  g/dL


 


Neutrophils #  8.5 H    (1.3-7.7)  k/uL


 


Lymphocytes #  0.8 L    (1.0-4.8)  k/uL


 


ABG pCO2     (35-45)  mmHg


 


ABG pO2     ()  mmHg


 


ABG HCO3     (21-25)  mmol/L


 


ABG Total CO2     (19-24)  mmol/L


 


ABG O2 Saturation     (94-97)  %


 


Carbon Dioxide   38 H   (22-30)  mmol/L


 


BUN   50 H   (9-20)  mg/dL


 


Glucose   109 H   (74-99)  mg/dL


 


POC Glucose (mg/dL)    101 H  (75-99)  mg/dL


 


Magnesium     (1.6-2.3)  mg/dL


 


ALT   107 H   (4-49)  U/L


 


Total Protein   5.7 L   (6.3-8.2)  g/dL


 


Albumin   2.5 L   (3.5-5.0)  g/dL














  12/23/21 Range/Units





  05:42 


 


RBC   (4.30-5.90)  m/uL


 


Hgb   (13.0-17.5)  gm/dL


 


Hct   (39.0-53.0)  %


 


MCHC   (31.0-37.0)  g/dL


 


Neutrophils #   (1.3-7.7)  k/uL


 


Lymphocytes #   (1.0-4.8)  k/uL


 


ABG pCO2  70 H  (35-45)  mmHg


 


ABG pO2  57 L*  ()  mmHg


 


ABG HCO3  39 H  (21-25)  mmol/L


 


ABG Total CO2  42 H  (19-24)  mmol/L


 


ABG O2 Saturation  88.6 L  (94-97)  %


 


Carbon Dioxide   (22-30)  mmol/L


 


BUN   (9-20)  mg/dL


 


Glucose   (74-99)  mg/dL


 


POC Glucose (mg/dL)   (75-99)  mg/dL


 


Magnesium   (1.6-2.3)  mg/dL


 


ALT   (4-49)  U/L


 


Total Protein   (6.3-8.2)  g/dL


 


Albumin   (3.5-5.0)  g/dL








                      Microbiology - Last 24 Hours (Table)











 12/21/21 14:55 Gram Stain - Preliminary





 Sputum Sputum Culture - Preliminary





    Presumptive Staph aureus





    Candida albicans

## 2021-12-23 NOTE — XR
EXAMINATION TYPE: XR chest 1V portable

 

DATE OF EXAM: 12/23/2021

 

Comparison: 12/22/2021

 

Clinical History: 56-year-old male SOB

 

Findings:

ET and NG tubes satisfactory. Left subclavian CVC tip seen to the lower SVC level. Heart normal size.
 Hyperinflation. Diffuse interstitial and patchy bilateral ascites, confluent in the periphery of the
 left lower lung. Overall unchanged to slightly increased.

 

 

Impression:

COPD with stable to slightly increased diffuse interstitial and multifocal patchy infiltrates.

## 2021-12-24 LAB
ALBUMIN SERPL-MCNC: 2.8 G/DL (ref 3.5–5)
ALP SERPL-CCNC: 149 U/L (ref 38–126)
ALT SERPL-CCNC: 134 U/L (ref 4–49)
ANION GAP SERPL CALC-SCNC: 4 MMOL/L
AST SERPL-CCNC: 63 U/L (ref 17–59)
BASOPHILS # BLD AUTO: 0 K/UL (ref 0–0.2)
BASOPHILS NFR BLD AUTO: 0 %
BUN SERPL-SCNC: 48 MG/DL (ref 9–20)
CALCIUM SPEC-MCNC: 8.8 MG/DL (ref 8.4–10.2)
CHLORIDE SERPL-SCNC: 102 MMOL/L (ref 98–107)
CO2 BLDA-SCNC: 43 MMOL/L (ref 19–24)
CO2 SERPL-SCNC: 40 MMOL/L (ref 22–30)
EOSINOPHIL # BLD AUTO: 0 K/UL (ref 0–0.7)
EOSINOPHIL NFR BLD AUTO: 0 %
ERYTHROCYTE [DISTWIDTH] IN BLOOD BY AUTOMATED COUNT: 3.98 M/UL (ref 4.3–5.9)
ERYTHROCYTE [DISTWIDTH] IN BLOOD: 13.8 % (ref 11.5–15.5)
GLUCOSE BLD-MCNC: 116 MG/DL (ref 75–99)
GLUCOSE BLD-MCNC: 137 MG/DL (ref 75–99)
GLUCOSE BLD-MCNC: 150 MG/DL (ref 75–99)
GLUCOSE SERPL-MCNC: 126 MG/DL (ref 74–99)
HCO3 BLDA-SCNC: 40 MMOL/L (ref 21–25)
HCT VFR BLD AUTO: 38.7 % (ref 39–53)
HGB BLD-MCNC: 12.1 GM/DL (ref 13–17.5)
LYMPHOCYTES # SPEC AUTO: 0.7 K/UL (ref 1–4.8)
LYMPHOCYTES NFR SPEC AUTO: 4 %
MCH RBC QN AUTO: 30.3 PG (ref 25–35)
MCHC RBC AUTO-ENTMCNC: 31.1 G/DL (ref 31–37)
MCV RBC AUTO: 97.3 FL (ref 80–100)
MONOCYTES # BLD AUTO: 0.9 K/UL (ref 0–1)
MONOCYTES NFR BLD AUTO: 6 %
NEUTROPHILS # BLD AUTO: 12.8 K/UL (ref 1.3–7.7)
NEUTROPHILS NFR BLD AUTO: 88 %
PCO2 BLDA: 69 MMHG (ref 35–45)
PH BLDA: 7.38 [PH] (ref 7.35–7.45)
PLATELET # BLD AUTO: 385 K/UL (ref 150–450)
PO2 BLDA: 58 MMHG (ref 83–108)
POTASSIUM SERPL-SCNC: 4.8 MMOL/L (ref 3.5–5.1)
PROT SERPL-MCNC: 6.3 G/DL (ref 6.3–8.2)
SODIUM SERPL-SCNC: 146 MMOL/L (ref 137–145)
WBC # BLD AUTO: 14.6 K/UL (ref 3.8–10.6)

## 2021-12-24 RX ADMIN — PANTOPRAZOLE SODIUM SCH MG: 40 INJECTION, POWDER, FOR SOLUTION INTRAVENOUS at 08:53

## 2021-12-24 RX ADMIN — CLEVIPIDINE SCH MLS/HR: 0.5 EMULSION INTRAVENOUS at 23:32

## 2021-12-24 RX ADMIN — FUROSEMIDE SCH MG: 10 INJECTION, SOLUTION INTRAMUSCULAR; INTRAVENOUS at 20:37

## 2021-12-24 RX ADMIN — Medication SCH MCG: at 08:59

## 2021-12-24 RX ADMIN — DEXTRAN 70 AND HYPROMELLOSE 2910 SCH DROPS: 1; 3 SOLUTION/ DROPS OPHTHALMIC at 15:59

## 2021-12-24 RX ADMIN — SODIUM CHLORIDE SCH MLS/HR: 900 INJECTION, SOLUTION INTRAVENOUS at 20:57

## 2021-12-24 RX ADMIN — GABAPENTIN SCH MG: 400 CAPSULE ORAL at 15:59

## 2021-12-24 RX ADMIN — OXYCODONE HYDROCHLORIDE AND ACETAMINOPHEN SCH MG: 500 TABLET ORAL at 08:58

## 2021-12-24 RX ADMIN — DEXTRAN 70 AND HYPROMELLOSE 2910 SCH DROPS: 1; 3 SOLUTION/ DROPS OPHTHALMIC at 08:41

## 2021-12-24 RX ADMIN — SODIUM CHLORIDE SCH MLS/HR: 900 INJECTION, SOLUTION INTRAVENOUS at 05:37

## 2021-12-24 RX ADMIN — METOCLOPRAMIDE SCH MG: 5 INJECTION, SOLUTION INTRAMUSCULAR; INTRAVENOUS at 18:04

## 2021-12-24 RX ADMIN — ALBUTEROL SULFATE SCH: 90 AEROSOL, METERED RESPIRATORY (INHALATION) at 20:25

## 2021-12-24 RX ADMIN — SODIUM CHLORIDE SCH MLS/HR: 900 INJECTION, SOLUTION INTRAVENOUS at 14:27

## 2021-12-24 RX ADMIN — DEXTRAN 70 AND HYPROMELLOSE 2910 SCH DROPS: 1; 3 SOLUTION/ DROPS OPHTHALMIC at 20:39

## 2021-12-24 RX ADMIN — DEXTRAN 70 AND HYPROMELLOSE 2910 SCH DROPS: 1; 3 SOLUTION/ DROPS OPHTHALMIC at 13:02

## 2021-12-24 RX ADMIN — INSULIN ASPART SCH UNIT: 100 INJECTION, SOLUTION INTRAVENOUS; SUBCUTANEOUS at 13:05

## 2021-12-24 RX ADMIN — DILTIAZEM HYDROCHLORIDE SCH MLS/HR: 5 INJECTION INTRAVENOUS at 11:43

## 2021-12-24 RX ADMIN — GABAPENTIN SCH MG: 400 CAPSULE ORAL at 09:00

## 2021-12-24 RX ADMIN — DILTIAZEM HYDROCHLORIDE SCH MLS/HR: 5 INJECTION INTRAVENOUS at 01:02

## 2021-12-24 RX ADMIN — SODIUM CHLORIDE SCH MLS/HR: 900 INJECTION, SOLUTION INTRAVENOUS at 17:31

## 2021-12-24 RX ADMIN — ENOXAPARIN SODIUM SCH MG: 80 INJECTION SUBCUTANEOUS at 20:36

## 2021-12-24 RX ADMIN — DEXTRAN 70 AND HYPROMELLOSE 2910 SCH DROPS: 1; 3 SOLUTION/ DROPS OPHTHALMIC at 03:16

## 2021-12-24 RX ADMIN — GABAPENTIN SCH MG: 400 CAPSULE ORAL at 20:37

## 2021-12-24 RX ADMIN — DILTIAZEM HYDROCHLORIDE SCH MLS/HR: 5 INJECTION INTRAVENOUS at 20:36

## 2021-12-24 RX ADMIN — ENOXAPARIN SODIUM SCH MG: 80 INJECTION SUBCUTANEOUS at 09:23

## 2021-12-24 RX ADMIN — METOCLOPRAMIDE SCH MG: 5 INJECTION, SOLUTION INTRAMUSCULAR; INTRAVENOUS at 13:05

## 2021-12-24 RX ADMIN — ALBUTEROL SULFATE SCH PUFF: 90 AEROSOL, METERED RESPIRATORY (INHALATION) at 08:10

## 2021-12-24 RX ADMIN — CLEVIPIDINE SCH MLS/HR: 0.5 EMULSION INTRAVENOUS at 12:57

## 2021-12-24 RX ADMIN — CISATRACURIUM BESYLATE SCH MLS/HR: 10 INJECTION INTRAVENOUS at 15:55

## 2021-12-24 RX ADMIN — INSULIN ASPART SCH UNIT: 100 INJECTION, SOLUTION INTRAVENOUS; SUBCUTANEOUS at 18:28

## 2021-12-24 RX ADMIN — LACTULOSE SCH GM: 20 SOLUTION ORAL at 08:56

## 2021-12-24 RX ADMIN — CHLORHEXIDINE GLUCONATE SCH ML: 1.2 RINSE ORAL at 08:59

## 2021-12-24 RX ADMIN — LEVOTHYROXINE SODIUM SCH MCG: 50 TABLET ORAL at 06:49

## 2021-12-24 RX ADMIN — INSULIN ASPART SCH: 100 INJECTION, SOLUTION INTRAVENOUS; SUBCUTANEOUS at 05:47

## 2021-12-24 RX ADMIN — ALBUTEROL SULFATE SCH PUFF: 90 AEROSOL, METERED RESPIRATORY (INHALATION) at 12:07

## 2021-12-24 RX ADMIN — SODIUM CHLORIDE SCH MLS/HR: 900 INJECTION, SOLUTION INTRAVENOUS at 08:42

## 2021-12-24 RX ADMIN — SODIUM CHLORIDE SCH MLS/HR: 900 INJECTION, SOLUTION INTRAVENOUS at 03:15

## 2021-12-24 RX ADMIN — CHLORHEXIDINE GLUCONATE SCH ML: 1.2 RINSE ORAL at 20:36

## 2021-12-24 RX ADMIN — Medication SCH MG: at 08:56

## 2021-12-24 RX ADMIN — CEFAZOLIN SCH MLS/HR: 330 INJECTION, POWDER, FOR SOLUTION INTRAMUSCULAR; INTRAVENOUS at 20:40

## 2021-12-24 RX ADMIN — SODIUM CHLORIDE SCH MLS/HR: 900 INJECTION, SOLUTION INTRAVENOUS at 00:13

## 2021-12-24 RX ADMIN — SODIUM CHLORIDE SCH MLS/HR: 900 INJECTION, SOLUTION INTRAVENOUS at 11:42

## 2021-12-24 RX ADMIN — CLEVIPIDINE SCH MLS/HR: 0.5 EMULSION INTRAVENOUS at 19:26

## 2021-12-24 RX ADMIN — CITALOPRAM HYDROBROMIDE SCH MG: 20 TABLET ORAL at 08:59

## 2021-12-24 RX ADMIN — METOCLOPRAMIDE SCH MG: 5 INJECTION, SOLUTION INTRAMUSCULAR; INTRAVENOUS at 05:47

## 2021-12-24 RX ADMIN — CISATRACURIUM BESYLATE SCH MLS/HR: 10 INJECTION INTRAVENOUS at 04:19

## 2021-12-24 RX ADMIN — CEFEPIME HYDROCHLORIDE SCH MLS/HR: 2 INJECTION, POWDER, FOR SOLUTION INTRAVENOUS at 08:59

## 2021-12-24 RX ADMIN — LACTULOSE SCH GM: 20 SOLUTION ORAL at 20:36

## 2021-12-24 RX ADMIN — CLEVIPIDINE SCH MLS/HR: 0.5 EMULSION INTRAVENOUS at 06:53

## 2021-12-24 RX ADMIN — DEXTROSE SCH MG: 50 INJECTION, SOLUTION INTRAVENOUS at 08:59

## 2021-12-24 RX ADMIN — FUROSEMIDE SCH MG: 10 INJECTION, SOLUTION INTRAMUSCULAR; INTRAVENOUS at 09:04

## 2021-12-24 RX ADMIN — CEFEPIME HYDROCHLORIDE SCH MLS/HR: 2 INJECTION, POWDER, FOR SOLUTION INTRAVENOUS at 20:39

## 2021-12-24 NOTE — P.PN
Subjective


Progress Note Date: 12/24/21








History of present illness


56-year-old  male with past medical history of fibromyalgia comes in to

emergency room with symptoms of nausea, vomiting, dizziness and sweating feeling

weak in with runny nose loss of taste and hence now for the past 1 week.  He 

presented on 12/7 was found to have positive COVID results.  Received his 

monoclonal antibodies.  He Came back to the emergency room because of worsening 

of symptoms.Vitals reviewed patient afebrile pulse 80 respiratory rate 20 blood 

pressure 138/82 saturating at 88% on 10 L labs reviewed WBC 6.6 hemoglobin 13.9 

d-dimer is elevated at 0.89, sodium 135 bicarb 21 BUN 16 creatinine 1.3 glucose 

187 ferritin 2822 AST 89 ALT 80 alkaline phosphatase 225 LDH 1091 and CRP 21 and

albumin 3.3


X-ray suggestive of bilateral patchy pneumonia.  Both interstitial and airspace 

infiltrate


Pulmonary clinic consulted for COVID pneumonia


Patient placed on Lovenox 40 subcu twice a day, vitamin C, D and zinc.  

Dexamethasone initiated at 6 mg twice a day IV


Patient is a candidate of Baritinib and will discuss about initiating it with 

pulmonary


Venous Doppler ordered





12/10: [facility].  He continues to have 

dyspnea and is on 15 L high flow nasal cannula and nonrebreather with pulse ox 

of 87 and 95%.  He's been afebrile, heart rate in the 60s and 70s, blood 

pressure 127/73.  Pro-calcitonin came back high at 9.75 and we started the 

patient on Zosyn and vancomycin for bacterial pneumonia coverage.  Patient has 

coarse crackles bilaterally.  No lower extremity edema.  He has been seen and 

followed by pulmonary medicine.  Patient is not a candidate for Baricitinib.  

Patient is continued on Decadron, Lovenox and vitamin supplements.





12/11: Is found sitting up in the edge of the bed.  He is currently not on a 

Ventimask.  However his pulse oxing still around 88 to 91 % on Ventimask or 15 L

high flow nasal cannula. Patient continues to have dyspnea with activity and 

speaking. Patient states that he is feeling much better compared to yesterday.  

Continues to have a cough.  No lower extremity edema.  He has rhonchi to the 

bases bilaterally.  He is currently on Decadron Lovenox and vitamin supplements.

 Patient remains afebrile, heart rate 62, respirations 17, blood pressure 118/68

pulse ox 93% on 15 L high flow nasal cannula





12/12: Patient was up to the bathroom showering today.  Awaiting inflammation 

markers today.  Patient still requires 10 L of O2 with a pulse ox of 90%.  

Patient continues to have crackles to the bilateral bases.  Dyspnea with 

activity and speaking.  Patient states he is feeling better however he is very 

tired.  Continues to have cough no lower extremity edema.  He still currently on

Decadron, Lovenox, and vitamin supplements.  Patient remains afebrile, heart 

rate 58, respirations 17, blood pressure 118/89,





12/13: Patient is still on high flow nasal cannula 15 L and nonrebreather with 

pulse ox of 89 and 99%.  He's been afebrile, heart rate 73, blood pressure 

120/72.  Creatinine 1.06.  Blood sugars running between 101 and 106.  Sputum 

culture is in progress.  Patient is followed closely by pulmonary medicine.  

Patient is continued on dexamethasone, Lovenox, Zosyn and vitamin supplements.  

Patient has been encouraged to prone several hours daily.





12/14: Patient remains on nonrebreather mask and AirVo at 60 L, FiO2 of 85 with 

pulse ox of 88%.  Appears to have more difficulty with breathing.  Repeat chest 

x-ray reveals stable bilateral infiltrates.  He has been afebrile, heart rate 

59, blood pressure 102/59.  Repeat d-dimer 1.53.  Blood sugars are well 

controlled.





12/15: Patient remains on nonrebreather and AirVo with pulse ox at 90%.  Patient

was agreeable.  He has been afebrile, heart rate 70, blood pressure 131/79.  CBG

running between 97 and 116 and CBGs and insulin will be discontinued.  Sputum 

cultures positive for Candida albicans.  Patient is increasing his activity, 

currently laying in bed on his side.  Lab work including inflammatory markers 

ordered for tomorrow.





12/16: Patient had a drop in his pulse ox and 78% with nonrebreather and AirVo 

and A-Team was called.  Patient stated that his shortness of breath was better 

than the night before.  Stat chest x-ray was done which revealed bilateral 

multifocal peripheral increased opacities consistent with Covid 19 infection are

redemonstrated.  No significant change.  Patient transitioned to BiPAP with 

pulse ox of 90%.


Patient verbalized that he wanted to be a no CODE STATUS and was encouraged to 

discuss with family.  It was determined the patient will be transferred to the 

intensive care unit most likely be intubated.  Patient was very resistant but 

after long discussion with Dr. Austin wheeler, patient agreed to intubation and

full CODE STATUS at this point.  Repeat blood work reveals WBC 9.4, hemoglobin 

12.7, platelet count 263.  D-dimer 1.31.  Blood sugars





12/17: Patient was transferred into the intensive care unit yesterday and 

subsequently intubated and placed on mechanical ventilation, tidal volume 450, 

FiO2 100, PEEP of 20.  Patient was prone this morning switched over to supine.  

He was started on tube feedings yesterday but had a high residual this morning 

after pronating.  He is currently on fentanyl, propofol, Nimbex drip.  One amp 

of bicarbonate given this morning.


Chest x-ray reveals lateral multifocal confluent and increased opacities 

greatest in the periphery consistent with Covid 19 infection redemonstrated.  No

sicca be unchanged from one day earlier.  


CTA of the chest done yesterday reveals suboptimal study without saddle central 

pulmonary embolism.  Cannot exclude smaller pulmonary emboli.  Bilateral 

multifocal and confluent groundglass opacities and organizing consolidations 

consistent with known Covid 19 infection.


Repeat blood work reveals WBC 13.1, hemoglobin 13.1, platelet count 381.  D-

dimer 1.17.  Sodium 135, potassium 5.2, chloride 104, CO2 24, BUN 29 and 

creatinine 0.78.  Capillary blood glucose running between 116 and 150s.  AST 

216.  C-reactive protein 7.9, pro-calcitonin 0.15.





12/18: This is day #9 of admission, patient remains in ICU, still mechanically 

ventilated for hypoxemia, starting 12/17/2021, on Dickerson assist control, rate 32,

PEEP of 20, FiO2 100%, dexamethasone 6 mg daily, electrolytes are stable, BUN 29

creatinine 0.84, x-ray shows bilateral multifocal opacities, consistent with 

Covid pneumonia, CT angiogram negative for PE, patient is on propofol, fentanyl 

and index vital HP, at 20 mL an hour 





12/19: Patient remains in ICU, intubated, NG tube in place, chest x-ray shows no

pneumothorax, no pleural effusion, there is bilateral patchy pulmonary 

interstitial infiltrates, with coalescent density in the left lower lobe.  No 

significant change from yesterdayALT iscreatinine is 0.75, ALT is trending 

downward, C reactive protein is 8.0 from a previous of 8.9Lasix given today, on 

dexamethasone, and  remains sedated.





12/20: Patient remains in the intensive care unit, intubated and on mechanical 

ventilation with FiO2 60% and PEEP of 20 with pulse ox of 87%.  Patient is being

prone for 16 hours per day.  He is tolerating tube feedings.  He has been 

started on lactulose this morning for constipation.  He has been afebrile, heart

rate 75, respiratory rate in the low 30s, blood pressure 148/65.  Patient was 

started on Clevidipine gtt. he is also on propofol, fentanyl and Nimbex.  No new

concerns, patient seems to be fairly stable.





12/21: Patient remains in intensive care unit intubated and on mechanical 

ventilation with FiO2 of 65, tidal volume 450, PEEP of 20.  Patient is being 

managed by the intensivist.  Patient has not been prone today due to skin 

breakdown on his face.  He is on tube feedings and tolerating.  Patient has been

afebrile, heart rate 102, blood pressure 151/71, pulse ox 85-89%.  Repeat blood 

work reveals WBC 13.5, hemoglobin 12.2.  Electrolytes normal.  BUN 47 creatinine

1.05.  .  C-reactive protein 19.8.  D-dimer 1.09.  The patient did not 

have a bowel movement after lactulose yesterday which is been repeated and 

Dulcolax suppository ordered for today.  





12/22, patient remains in ICU, intubated and mechanically vented, is starting to

have skin excoriations, in the face and with deep tissue injury chin area, from 

pronating position, for that reason, they have avoided pronating since 

yesterday.  Patient's currently sedated, and is receiving fentanyl, and nimbex 

clevipres. ngt tolerated at 50 cch/r vital hp, inflammatory markers are still 

elevated, with LDH of 58, CRP of 19, still with bowel issues, they have 

increased lactulose to 20 mg twice a day, and Dulcolax suppository daily.  No 

plans for  weaning off the vent trials at this time, probably might end up in a 

trach PEG .





12/23 patient went into A. fib RVR last night, for which they have started him 

on amiodarone, which did not help, patient therefore was started on Cardizem 

drip, currently at 15 mics, still no bowel movement, despite Dulcolax and 

lactulose, we'll going to do half dose MiraLAX a day, and may repeat in 12 

hours, patient is still no vent, with PEEP of 21





12/24 patient in ICU, still mechanical ventilation, closely followed by 

pulmonary intensivist not much improvement, over the past 48 hours, BP still 

high at 21, still requires sedation, x-rays continue to show multifocal 

infiltrates, patient remains on his IV antibiotics cefepime, empiric treatments,

Decadron 6 mg daily, and Lovenox shot.  Lasix 40 mg every 12 hours patient still

has low-grade temp, between 99.6 T-max still tachypneic.  Pulse ox 85% FiO2 70% 

on vent





ROS


Unable to obtain due to intubation





Physical exam


General Appearance: Patient is laying in the ICU bed, intubated and on 

mechanical ventilation.  No acute distress.  Patient appears to be comfortable. 


Full examination deferred to intensive this due to intubation and Covid 19.





Assessment and plan


1. Acute hypoxic respiratory failure secondary to Covid 19 pneumonia.  Consult 

with pulmonary medicine appreciated.  Patient was intubated and placed on 

mechanical ventilation 12/16.  Continue care in the intensive care unit, 

proning, currently on fentanyl, propofol and Nimbex.





2. Acute COVID pneumonia. Dexamethasone 6 mg IV  once a day,  Lovenox 40 subcu 

daily, Not a candidate for Baricitinib, Monitor inflammatory markers. Continue 

supplements vitamin C, D and zinc





3. Acute transaminitis Secondary to viral inflammation. Continue monitoring





4. CKD stage 3.  Continue to monitor patient's creatinine.  No acute kidney 

injury





5. History of fibromyalgia. continue gabapentin 800 3 times a day, Citalopram 40

mg by mouth daily





6. Insomnia. Was on Eszopiclone 3 mg daily at bedtime 





7. Hypothyroidism. Levothyroxine 50 g by mouth daily





8.  Hypertension.  Patient started on  Clevidipine gtt. 





9.  DVT prophylaxis. Lovenox 40 subcu daily.





10. GI prophylaxis.  Protonix 40 mg IV push daily





11.  Hyperglycemia secondary to steroids, IV drips, patient is on NovoLog scale.





12.  Constipation.  Lactulose 20 g daily and Dulcolax suppository today MiraLAX.





Prognosis guarded.





Status: Full code





Discharge Plan: 


To be determined.


                               Current Medications





Albuterol Sulfate (Albuterol Hfa Inhaler)  2 puff INHALATION RT-TID [facility]


   Last Admin: 12/24/21 12:07 Dose:  2 puff


   Documented by: 


Artificial Tears (Artificial Tears-Hypromellose Drops 15 Ml Btl)  1 drops BOTH 

EYES Q4H [facility]


   Last Admin: 12/24/21 08:41 Dose:  1 drops


   Documented by: 


Ascorbic Acid (Ascorbic Acid 500 Mg Tab)  1,000 mg PO DAILY [facility]


   Last Admin: 12/24/21 08:58 Dose:  1,000 mg


   Documented by: 


Bisacodyl (Bisacodyl 10 Mg Supp)  10 mg RECTAL DAILY [facility]


   Last Admin: 12/24/21 08:59 Dose:  10 mg


   Documented by: 


Chlorhexidine Gluconate (Chlorhexidine Gluconate 15 Ml Cup)  15 ml MUCOUS MEM 

BID [facility]


   Last Admin: 12/24/21 08:59 Dose:  15 ml


   Documented by: 


Cholecalciferol (Cholecalciferol 25 Mcg (1000 Iu) Tablet)  25 mcg PO DAILY [facility]


   Last Admin: 12/24/21 08:59 Dose:  25 mcg


   Documented by: 


Citalopram Hydrobromide (Citalopram Hydrobromide 20 Mg Tab)  40 mg PO DAILY [facility]


   Last Admin: 12/24/21 08:59 Dose:  40 mg


   Documented by: 


Dexamethasone Sodium Phosphate (Dexamethasone Sod Phosphate 10 Mg/Ml 1 Ml Vial) 

6 mg IVP DAILY [facility]


   Last Admin: 12/24/21 08:59 Dose:  6 mg


   Documented by: 


Enoxaparin Sodium (Enoxaparin 80 Mg/0.8 Ml Syringe)  80 mg SQ BID [facility]


   Last Admin: 12/24/21 09:23 Dose:  80 mg


   Documented by: 


Furosemide (Furosemide 10 Mg/Ml 4 Ml Vial)  40 mg IV Q12HR [facility]


   Last Admin: 12/24/21 09:04 Dose:  40 mg


   Documented by: 


Gabapentin (Gabapentin 400 Mg Cap)  800 mg PO TID [facility]


   Last Admin: 12/24/21 09:00 Dose:  800 mg


   Documented by: 


Propofol 1,000 mg/ IV Solution  100 mls @ 0 mls/hr IV .Q0M [facility]; Protocol


   Last Admin: 12/24/21 07:11 Dose:  50 mcg/kg/min, 36.6 mls/hr


   Documented by: 


Fentanyl Citrate 1,000 mcg/ (Sodium Chloride)  100 mls @ 5.557 mls/hr IV .Q18H 

[facility]; Protocol


   Last Admin: 12/24/21 08:42 Dose:  3 mcg/kg/hr, 33.339 mls/hr


   Documented by: 


Cisatracurium Besylate 200 mg/ (Sodium Chloride)  200 mls @ 6.668 mls/hr IV 

.Q24H [facility]; Protocol


   Last Admin: 12/24/21 04:19 Dose:  3 mcg/kg/min, 20.003 mls/hr


   Documented by: 


Sodium Chloride (Saline 0.9%)  1,000 mls @ 20 mls/hr IV .Q24H [facility]


   Last Admin: 12/23/21 21:24 Dose:  20 mls/hr


   Documented by: 


Clevidipine 25 mg/ IV Solution  50 mls @ 2 mls/hr IV .Q24H [facility]; Protocol


   Last Admin: 12/24/21 06:53 Dose:  8 mg/hr, 16 mls/hr


   Documented by: 


Diltiazem HCl 125 mg/ Sodium (Chloride)  125 mls @ 5 mls/hr IV .Q24H [facility]


   Last Admin: 12/24/21 11:43 Dose:  15 mg/hr, 15 mls/hr


   Documented by: 


Cefepime HCl 2 gm/ Sodium (Chloride)  100 mls @ 25 mls/hr IVPB Q12HR [facility]


   Last Admin: 12/24/21 08:59 Dose:  25 mls/hr


   Documented by: 


Insulin Aspart (Insulin Aspart (Novolog) 100 Unit/Ml Vial)  0 unit SQ Q6H [facility]; 

Protocol


   Last Admin: 12/24/21 05:47 Dose:  Not Given


   Documented by: 


Lactulose (Lactulose 20 Gm/30 Ml Cup)  20 gm PO BID [facility]


   Last Admin: 12/24/21 08:56 Dose:  20 gm


   Documented by: 


Levothyroxine Sodium (Levothyroxine 50 Mcg Tab)  50 mcg PO 0630 [facility]


   Last Admin: 12/24/21 06:49 Dose:  50 mcg


   Documented by: 


Metoclopramide HCl (Metoclopramide 5 Mg/Ml 2 Ml Vial)  10 mg IVP Q6HR [facility]


   Last Admin: 12/24/21 05:47 Dose:  10 mg


   Documented by: 


Pantoprazole Sodium (Pantoprazole 40 Mg/10 Ml Vial)  40 mg IVP DAILY [facility]


   Last Admin: 12/24/21 08:53 Dose:  40 mg


   Documented by: 


Zinc Sulfate (Zinc Sulfate 220 Mg Cap)  220 mg PO DAILY [facility]


   Last Admin: 12/24/21 08:56 Dose:  220 mg


   Documented by: 





                                   Vital Signs











Temp  99.6 F   12/24/21 04:00


 


Pulse  93   12/24/21 07:00


 


Resp  32 H  12/24/21 07:00


 


BP  106/63   12/23/21 05:00


 


Pulse Ox  88 L  12/24/21 07:00








                                 Intake & Output











 12/23/21 12/24/21 12/24/21





 18:59 06:59 18:59


 


Intake Total 6021.233 9741.470 248


 


Output Total 1910 1785 80


 


Balance -119.537 -22.530 168


 


Weight 121.5 kg  


 


Intake:   


 


   376 23


 


    Cefepime 2 gm In Sodium 100 100 





    Chloride 0.9% 100 ml @ 25   





    mls/hr IVPB Q12HR [facility] Rx   





    #:[id number]   


 


    Normal Saline 0.9 42 36 3





    Pressure Bag @ 3mL/hr   


 


    Sodium Chloride 0.9% 1, 280 240 20





    000 ml @ 20 mls/hr IV .   





    Q24H [facility] Rx#:[id number]   


 


  Intake, IV Titration 978.463 996.470 225





  Amount   


 


    Cisatracurium 200 mg In 394.726 192.696 





    Sodium Chloride 0.9% 180   





    ml @ 1 MCG/KG/MIN 6.668   





    mls/hr IV .Q24H [facility] Rx#:   





    [id number]   


 


    Clevidipine Butyrate 25 26.533 50 





    mg In Empty Bag 1 bag @ 1   





    MG/HR 2 mls/hr IV .Q24H   





    [facility] Rx#:[id number]   


 


    Diltiazem 125 mg In 83.75  125





    Sodium Chloride 0.9% 100   





    ml @ 5 MG/HR 5 mls/hr IV   





    .Q24H [facility] Rx#:[id number]   


 


    fentaNYL (PF). 1,000 mcg 373.454 374.474 100





    In Sodium Chloride 0.9%   





    80 ml @ 0.5 MCG/KG/HR 5.   





    557 mls/hr IV .Q18H [facility]   





    Rx#:[id number]   


 


    propofoL 1,000 mg In 100 379.3 





    Empty Bag 1 bag @ Titrate   





    IV .Q0M [facility] Rx#:   





    [id number]   


 


  Tube Feeding 210 150 


 


  Other 180 240 


 


Output:   


 


  Urine 1910 1785 80


 


Other:   


 


  Voiding Method Indwelling Catheter Indwelling Catheter 








                       ABP, PAP, CO, CI - Last Documented











Arterial Blood Pressure        145/59











                       Laboratory Results - Last 24 Hours











  12/23/21 12/23/21 12/23/21





  13:37 17:54 23:40


 


WBC   


 


RBC   


 


Hgb   


 


Hct   


 


MCV   


 


MCH   


 


MCHC   


 


RDW   


 


Plt Count   


 


MPV   


 


Neutrophils %   


 


Lymphocytes %   


 


Monocytes %   


 


Eosinophils %   


 


Basophils %   


 


Neutrophils #   


 


Lymphocytes #   


 


Monocytes #   


 


Eosinophils #   


 


Basophils #   


 


Hypochromasia   


 


D-Dimer   


 


Sample Site   


 


ABG pH   


 


ABG pCO2   


 


ABG pO2   


 


ABG HCO3   


 


ABG Total CO2   


 


ABG O2 Saturation   


 


ABG Base Excess   


 


Austin Test   


 


FiO2   


 


Sodium   


 


Potassium   


 


Chloride   


 


Carbon Dioxide   


 


Anion Gap   


 


BUN   


 


Creatinine   


 


Est GFR (CKD-EPI)AfAm   


 


Est GFR (CKD-EPI)NonAf   


 


Glucose   


 


POC Glucose (mg/dL)  166 H  154 H  134 H


 


POC Glu Operater ID  Olivier, Rain Aguayo, Scooby Cifuentes


 


Calcium   


 


Total Bilirubin   


 


AST   


 


ALT   


 


Alkaline Phosphatase   


 


Total Protein   


 


Albumin   














  12/24/21 12/24/21 12/24/21





  04:10 04:10 04:10


 


WBC  14.6 H  


 


RBC  3.98 L  


 


Hgb  12.1 L  


 


Hct  38.7 L  


 


MCV  97.3  


 


MCH  30.3  


 


MCHC  31.1  


 


RDW  13.8  


 


Plt Count  385  


 


MPV  9.0  


 


Neutrophils %  88  


 


Lymphocytes %  4  


 


Monocytes %  6  


 


Eosinophils %  0  


 


Basophils %  0  


 


Neutrophils #  12.8 H  


 


Lymphocytes #  0.7 L  


 


Monocytes #  0.9  


 


Eosinophils #  0.0  


 


Basophils #  0.0  


 


Hypochromasia  Slight  


 


D-Dimer    0.58


 


Sample Site   


 


ABG pH   


 


ABG pCO2   


 


ABG pO2   


 


ABG HCO3   


 


ABG Total CO2   


 


ABG O2 Saturation   


 


ABG Base Excess   


 


Austin Test   


 


FiO2   


 


Sodium   146 H 


 


Potassium   4.8 


 


Chloride   102 


 


Carbon Dioxide   40 H 


 


Anion Gap   4 


 


BUN   48 H 


 


Creatinine   1.11 


 


Est GFR (CKD-EPI)AfAm   85 


 


Est GFR (CKD-EPI)NonAf   74 


 


Glucose   126 H 


 


POC Glucose (mg/dL)   


 


POC Glu Operater ID   


 


Calcium   8.8 


 


Total Bilirubin   0.7 


 


AST   63 H 


 


ALT   134 H 


 


Alkaline Phosphatase   149 H 


 


Total Protein   6.3 


 


Albumin   2.8 L 














  12/24/21 12/24/21 12/24/21





  05:43 05:59 11:43


 


WBC   


 


RBC   


 


Hgb   


 


Hct   


 


MCV   


 


MCH   


 


MCHC   


 


RDW   


 


Plt Count   


 


MPV   


 


Neutrophils %   


 


Lymphocytes %   


 


Monocytes %   


 


Eosinophils %   


 


Basophils %   


 


Neutrophils #   


 


Lymphocytes #   


 


Monocytes #   


 


Eosinophils #   


 


Basophils #   


 


Hypochromasia   


 


D-Dimer   


 


Sample Site   [facility] 


 


ABG pH   7.38 


 


ABG pCO2   69 H 


 


ABG pO2   58 L* 


 


ABG HCO3   40 H* 


 


ABG Total CO2   43 H 


 


ABG O2 Saturation   90.0 L 


 


ABG Base Excess   15.2 


 


Austin Test   no 


 


FiO2   70 


 


Sodium   


 


Potassium   


 


Chloride   


 


Carbon Dioxide   


 


Anion Gap   


 


BUN   


 


Creatinine   


 


Est GFR (CKD-EPI)AfAm   


 


Est GFR (CKD-EPI)NonAf   


 


Glucose   


 


POC Glucose (mg/dL)  116 H   150 H


 


POC Glu Operater ID  Bacilio, Scooby Bronson, Latrice


 


Calcium   


 


Total Bilirubin   


 


AST   


 


ALT   


 


Alkaline Phosphatase   


 


Total Protein   


 


Albumin   








                                        





Objective





- Vital Signs


Vital signs: 


                                   Vital Signs











Temp  99.6 F   12/24/21 04:00


 


Pulse  93   12/24/21 07:00


 


Resp  32 H  12/24/21 07:00


 


BP  106/63   12/23/21 05:00


 


Pulse Ox  88 L  12/24/21 07:00








                                 Intake & Output











 12/23/21 12/24/21 12/24/21





 18:59 06:59 18:59


 


Intake Total 8064.988 1237.470 248


 


Output Total 1910 1785 80


 


Balance -119.537 -22.530 168


 


Weight 121.5 kg  


 


Intake:   


 


   376 23


 


    Cefepime 2 gm In Sodium 100 100 





    Chloride 0.9% 100 ml @ 25   





    mls/hr IVPB Q12HR [facility] Rx   





    #:[id number]   


 


    Normal Saline 0.9 42 36 3





    Pressure Bag @ 3mL/hr   


 


    Sodium Chloride 0.9% 1, 280 240 20





    000 ml @ 20 mls/hr IV .   





    Q24H [facility] Rx#:[id number]   


 


  Intake, IV Titration 978.463 996.470 225





  Amount   


 


    Cisatracurium 200 mg In 394.726 192.696 





    Sodium Chloride 0.9% 180   





    ml @ 1 MCG/KG/MIN 6.668   





    mls/hr IV .Q24H [facility] Rx#:   





    [id number]   


 


    Clevidipine Butyrate 25 26.533 50 





    mg In Empty Bag 1 bag @ 1   





    MG/HR 2 mls/hr IV .Q24H   





    [facility] Rx#:[id number]   


 


    Diltiazem 125 mg In 83.75  125





    Sodium Chloride 0.9% 100   





    ml @ 5 MG/HR 5 mls/hr IV   





    .Q24H [facility] Rx#:[id number]   


 


    fentaNYL (PF). 1,000 mcg 373.454 374.474 100





    In Sodium Chloride 0.9%   





    80 ml @ 0.5 MCG/KG/HR 5.   





    557 mls/hr IV .Q18H [facility]   





    Rx#:[id number]   


 


    propofoL 1,000 mg In 100 379.3 





    Empty Bag 1 bag @ Titrate   





    IV .Q0M [facility] Rx#:   





    [id number]   


 


  Tube Feeding 210 150 


 


  Other 180 240 


 


Output:   


 


  Urine 1910 1785 80


 


Other:   


 


  Voiding Method Indwelling Catheter Indwelling Catheter 








                       ABP, PAP, CO, CI - Last Documented











Arterial Blood Pressure        145/59

















- Labs


CBC & Chem 7: 


                                 12/25/21 03:25





                                 12/26/21 05:55


Labs: 


                  Abnormal Lab Results - Last 24 Hours (Table)











  12/23/21 12/23/21 12/23/21 Range/Units





  13:37 17:54 23:40 


 


WBC     (3.8-10.6)  k/uL


 


RBC     (4.30-5.90)  m/uL


 


Hgb     (13.0-17.5)  gm/dL


 


Hct     (39.0-53.0)  %


 


Neutrophils #     (1.3-7.7)  k/uL


 


Lymphocytes #     (1.0-4.8)  k/uL


 


ABG pCO2     (35-45)  mmHg


 


ABG pO2     ()  mmHg


 


ABG HCO3     (21-25)  mmol/L


 


ABG Total CO2     (19-24)  mmol/L


 


ABG O2 Saturation     (94-97)  %


 


Sodium     (137-145)  mmol/L


 


Carbon Dioxide     (22-30)  mmol/L


 


BUN     (9-20)  mg/dL


 


Glucose     (74-99)  mg/dL


 


POC Glucose (mg/dL)  166 H  154 H  134 H  (75-99)  mg/dL


 


AST     (17-59)  U/L


 


ALT     (4-49)  U/L


 


Alkaline Phosphatase     ()  U/L


 


Albumin     (3.5-5.0)  g/dL














  12/24/21 12/24/21 12/24/21 Range/Units





  04:10 04:10 05:43 


 


WBC  14.6 H    (3.8-10.6)  k/uL


 


RBC  3.98 L    (4.30-5.90)  m/uL


 


Hgb  12.1 L    (13.0-17.5)  gm/dL


 


Hct  38.7 L    (39.0-53.0)  %


 


Neutrophils #  12.8 H    (1.3-7.7)  k/uL


 


Lymphocytes #  0.7 L    (1.0-4.8)  k/uL


 


ABG pCO2     (35-45)  mmHg


 


ABG pO2     ()  mmHg


 


ABG HCO3     (21-25)  mmol/L


 


ABG Total CO2     (19-24)  mmol/L


 


ABG O2 Saturation     (94-97)  %


 


Sodium   146 H   (137-145)  mmol/L


 


Carbon Dioxide   40 H   (22-30)  mmol/L


 


BUN   48 H   (9-20)  mg/dL


 


Glucose   126 H   (74-99)  mg/dL


 


POC Glucose (mg/dL)    116 H  (75-99)  mg/dL


 


AST   63 H   (17-59)  U/L


 


ALT   134 H   (4-49)  U/L


 


Alkaline Phosphatase   149 H   ()  U/L


 


Albumin   2.8 L   (3.5-5.0)  g/dL














  12/24/21 12/24/21 Range/Units





  05:59 11:43 


 


WBC    (3.8-10.6)  k/uL


 


RBC    (4.30-5.90)  m/uL


 


Hgb    (13.0-17.5)  gm/dL


 


Hct    (39.0-53.0)  %


 


Neutrophils #    (1.3-7.7)  k/uL


 


Lymphocytes #    (1.0-4.8)  k/uL


 


ABG pCO2  69 H   (35-45)  mmHg


 


ABG pO2  58 L*   ()  mmHg


 


ABG HCO3  40 H*   (21-25)  mmol/L


 


ABG Total CO2  43 H   (19-24)  mmol/L


 


ABG O2 Saturation  90.0 L   (94-97)  %


 


Sodium    (137-145)  mmol/L


 


Carbon Dioxide    (22-30)  mmol/L


 


BUN    (9-20)  mg/dL


 


Glucose    (74-99)  mg/dL


 


POC Glucose (mg/dL)   150 H  (75-99)  mg/dL


 


AST    (17-59)  U/L


 


ALT    (4-49)  U/L


 


Alkaline Phosphatase    ()  U/L


 


Albumin    (3.5-5.0)  g/dL








                      Microbiology - Last 24 Hours (Table)











 12/21/21 14:55 Gram Stain - Final





 Sputum Sputum Culture - Final





    Staphylococcus aureus





    Candida albicans

## 2021-12-24 NOTE — P.PN
Subjective


Progress Note Date: 12/24/21


Principal diagnosis: 





Acute hypoxic history failure secondary to COVID-19 pneumonia





On 12/22/2021 patient seen in follow-up in the intensive care unit, he remains 

intubated, sedated and paralyzed on assist-control mode of ventilation with a 

rate of 35, tidal M is 450, FiO2 65% and PEEP of 20, his peak air pressure is 

35, plateau pressure is 40.  This morning's blood gas shows pO2 of 61, pCO2 of 

66, and pH of 7.36, this was done on the above-mentioned vent settings.  Today's

chest x-ray has been reviewed showing stable diffuse bilateral infiltrates, and 

interval development of subcutaneous emphysema involving the soft tissues of the

neck, there is small amount of pneumomediastinum not excluded.  She is currently

on Diprivan at 50 mics per kilo per minute, fentanyl at 2.5 mics per kilo per 

minute, Nimbex is at 3 mics per kilo per minute, Cleviprex of the 5 mg per hour.

 He is tolerating tube feedings with vital HP at a rate of 15 with standard 

water flushes.  No fever or chills overnight, he is not requiring any 

vasopressor support, he is actually been a bit hypertensive requiring Cleviprex 

infusion, currently his blood pressure is better controlled and is 153/68 with a

mean of 92.  Patient has not been prone for last 48 hours related to development

of deep tissue injury on his face.  Today's labs show white blood cell count of 

14.9, hemoglobin of 12.8, platelet count is 449, sodium is 140, potassium is 

5.0, chloride is 102, CO2 36, B1 is 50, creatinine is 1.01, his AST is 41, ALT 

is 119, alk phos is 129.  His last set of inflammatory markers from yesterday 

showed LDH of 564, and CRP of 19.8, today's set of inflammatory markers is still

pending.  Patient has been receiving lactulose and Reglan, he has not had a 

bowel movement in several days, and we will increase his lactulose, she also 

remains on Lasix 40 mg twice daily however she is still does daily in negative 

fluid balance over the last 24 hours of -223 mL. 





Very today on 12/23/21, patient remains in the ICU, intubated and mechanically 

ventilated.  Patient is on assist control rate of 32 tidal volume 450 FiO2 70% 

PEEP is at 20.  ABG is marginal although the patient is on relatively high FiO2 

and high PEEP.  Patient has a pO2 of 57 pCO2 of 70 pH of 7.36.  His electrolytes

are normal renal profile showed a BUN of 50 creatinine 0.95.  WBC count is 10.7 

hemoglobin is 11.7.  Chest x-ray continues to show bilateral interstitial 

infiltrates consistent with COVID-19 pneumonia.  Patient is on multiple drips 

including Cleviprex at 3 mg per hour, he is on Cardizem at 15 mg per hour 

propofol at 50 fentanyl 3 mcg/kg/h Nimbex at 3 mcg/kg/m.  He is on 0.9 normal 

saline at KVO.  Yesterday the patient developed atrial fibrillation with RVR, 

and today I increased his Lovenox to 80 mg subcu twice a day, converted to sinus

rhythm and early this morning.  Patient is on enteral feeding.  It is up to 

goal.  Overall clinical status is extremely marginal.  Remains on cefepime 

empirically.  Remains on the COVID-19 cocktail.  Lovenox was increased to 80 mg 

subcu twice a day, Azopt Protonix 40 mg IV push daily.  And he is also on zinc. 

Lasix is given at 40 mg IV push every 12 hours.





Reevaluated today on 12/24/21, patient remains in the ICU intubated and 

mechanically ventilated.  Not showing much of an improvement over the last 24 

hours.  Remains on assist control rate of 32.  Tidal volume of 450 FiO2 70% PEEP

is still high/20.  ABG is marginal with a pO2 of 58 pCO2 of 69 pH of 7.39.  Vale

ent remains on propofol at 50 fentanyl 3 Nimbex at 2.5 , Cleviprex S3 milligrams

per hour.  His plateau pressure is 33 and peak airway pressure is in the high 

30s.  Dimer is 0.58 BUN is 48 creatinine 1.11.  There is 14.6 hemoglobin is 

12.1.  X-ray continues to show multifocal infiltrates consistent with COVID-19 

pneumonia.  Liver enzymes remain elevated with alkaline phosphatase of 149 ALT 

of 134 AST of 63.  Patient remains on Cardizem drip and he is now in sinus 

rhythm, his drip is at 5 mg per hour.  Remains on cefepime empirically.  Remains

on the COVID-19 cocktail.  Remains on Decadron 6 mg of push daily and Protonix 

as well as Lovenox 40 mg subcu daily.  Remains on Lasix 40 mg IV push twice a 

day.  He is also on lactulose 20 mg twice a day.  And Reglan was added today.  

No bowel movements in the last few days

















Objective





- Vital Signs


Vital signs: 


                                   Vital Signs











Temp  99.6 F   12/24/21 04:00


 


Pulse  93   12/24/21 07:00


 


Resp  32 H  12/24/21 07:00


 


BP  106/63   12/23/21 05:00


 


Pulse Ox  88 L  12/24/21 07:00








                                 Intake & Output











 12/23/21 12/24/21 12/24/21





 18:59 06:59 18:59


 


Intake Total 1272.982 8092.470 248


 


Output Total 1910 1785 80


 


Balance -119.537 -22.530 168


 


Weight 121.5 kg  


 


Intake:   


 


   376 23


 


    Cefepime 2 gm In Sodium 100 100 





    Chloride 0.9% 100 ml @ 25   





    mls/hr IVPB Q12HR [facility]   





    #:[id number]   


 


    Normal Saline 0.9 42 36 3





    Pressure Bag @ 3mL/hr   


 


    Sodium Chloride 0.9% 1, 280 240 20





    000 ml @ 20 mls/hr IV .   





    Q24H [facility] Rx#:[id number]   


 


  Intake, IV Titration 978.463 996.470 225





  Amount   


 


    Cisatracurium 200 mg In 394.726 192.696 





    Sodium Chloride 0.9% 180   





    ml @ 1 MCG/KG/MIN 6.668   





    mls/hr IV .Q24H [facility] Rx#:   





    [id number]   


 


    Clevidipine Butyrate 25 26.533 50 





    mg In Empty Bag 1 bag @ 1   





    MG/HR 2 mls/hr IV .Q24H   





    [facility] Rx#:[id number]   


 


    Diltiazem 125 mg In 83.75  125





    Sodium Chloride 0.9% 100   





    ml @ 5 MG/HR 5 mls/hr IV   





    .Q24H [facility] Rx#:[id number]   


 


    fentaNYL (PF). 1,000 mcg 373.454 374.474 100





    In Sodium Chloride 0.9%   





    80 ml @ 0.5 MCG/KG/HR 5.   





    557 mls/hr IV .Q18H [facility]   





    Rx#:[id number]   


 


    propofoL 1,000 mg In 100 379.3 





    Empty Bag 1 bag @ Titrate   





    IV .Q0M [facility] Rx#:   





    [id number]   


 


  Tube Feeding 210 150 


 


  Other 180 240 


 


Output:   


 


  Urine 1910 1785 80


 


Other:   


 


  Voiding Method Indwelling Catheter Indwelling Catheter 








                       ABP, PAP, CO, CI - Last Documented











Arterial Blood Pressure        145/59

















- Exam





GENERAL EXAM: Revealed 56-year-old white male intubated mechanically ventilated,

sedated and paralyzed.


HEAD: Normocephalic/atraumatic.  Cellulitis noted in the chin area related to 

recent prone position especially on the left side.


EYES: PERRLA, MI, nonicteric, no neck masses


NOSE: Clear with pink turbinates.


THROAT: No erythema or exudates.


NECK: No masses, no JVD, no thyroid enlargement, no adenopathy.


CHEST: Symmetrical chest expansion, crackles at the bases.


CVS: Regular rate and rhythm, normal S1 and S2, no gallops, no murmurs, no rubs


ABDOMEN: Soft, nontender.  No hepatosplenomegaly, normal bowel sounds, no 

guarding or rigidity.


EXTREMITIES: No clubbing, trace of bipedal edema, no cyanosis, 2+ pulses and 

upper and lower extremities.


SKIN: No rashes


CENTRAL NERVOUS SYSTEM: Sedated, paralyzed, unable to assess.








- Labs


CBC & Chem 7: 


                                 12/24/21 04:10





                                 12/24/21 04:10


Labs: 


                  Abnormal Lab Results - Last 24 Hours (Table)











  12/23/21 12/23/21 12/23/21 Range/Units





  13:37 17:54 23:40 


 


WBC     (3.8-10.6)  k/uL


 


RBC     (4.30-5.90)  m/uL


 


Hgb     (13.0-17.5)  gm/dL


 


Hct     (39.0-53.0)  %


 


Neutrophils #     (1.3-7.7)  k/uL


 


Lymphocytes #     (1.0-4.8)  k/uL


 


ABG pCO2     (35-45)  mmHg


 


ABG pO2     ()  mmHg


 


ABG HCO3     (21-25)  mmol/L


 


ABG Total CO2     (19-24)  mmol/L


 


ABG O2 Saturation     (94-97)  %


 


Sodium     (137-145)  mmol/L


 


Carbon Dioxide     (22-30)  mmol/L


 


BUN     (9-20)  mg/dL


 


Glucose     (74-99)  mg/dL


 


POC Glucose (mg/dL)  166 H  154 H  134 H  (75-99)  mg/dL


 


AST     (17-59)  U/L


 


ALT     (4-49)  U/L


 


Alkaline Phosphatase     ()  U/L


 


Albumin     (3.5-5.0)  g/dL














  12/24/21 12/24/21 12/24/21 Range/Units





  04:10 04:10 05:43 


 


WBC  14.6 H    (3.8-10.6)  k/uL


 


RBC  3.98 L    (4.30-5.90)  m/uL


 


Hgb  12.1 L    (13.0-17.5)  gm/dL


 


Hct  38.7 L    (39.0-53.0)  %


 


Neutrophils #  12.8 H    (1.3-7.7)  k/uL


 


Lymphocytes #  0.7 L    (1.0-4.8)  k/uL


 


ABG pCO2     (35-45)  mmHg


 


ABG pO2     ()  mmHg


 


ABG HCO3     (21-25)  mmol/L


 


ABG Total CO2     (19-24)  mmol/L


 


ABG O2 Saturation     (94-97)  %


 


Sodium   146 H   (137-145)  mmol/L


 


Carbon Dioxide   40 H   (22-30)  mmol/L


 


BUN   48 H   (9-20)  mg/dL


 


Glucose   126 H   (74-99)  mg/dL


 


POC Glucose (mg/dL)    116 H  (75-99)  mg/dL


 


AST   63 H   (17-59)  U/L


 


ALT   134 H   (4-49)  U/L


 


Alkaline Phosphatase   149 H   ()  U/L


 


Albumin   2.8 L   (3.5-5.0)  g/dL














  12/24/21 12/24/21 Range/Units





  05:59 11:43 


 


WBC    (3.8-10.6)  k/uL


 


RBC    (4.30-5.90)  m/uL


 


Hgb    (13.0-17.5)  gm/dL


 


Hct    (39.0-53.0)  %


 


Neutrophils #    (1.3-7.7)  k/uL


 


Lymphocytes #    (1.0-4.8)  k/uL


 


ABG pCO2  69 H   (35-45)  mmHg


 


ABG pO2  58 L*   ()  mmHg


 


ABG HCO3  40 H*   (21-25)  mmol/L


 


ABG Total CO2  43 H   (19-24)  mmol/L


 


ABG O2 Saturation  90.0 L   (94-97)  %


 


Sodium    (137-145)  mmol/L


 


Carbon Dioxide    (22-30)  mmol/L


 


BUN    (9-20)  mg/dL


 


Glucose    (74-99)  mg/dL


 


POC Glucose (mg/dL)   150 H  (75-99)  mg/dL


 


AST    (17-59)  U/L


 


ALT    (4-49)  U/L


 


Alkaline Phosphatase    ()  U/L


 


Albumin    (3.5-5.0)  g/dL








                      Microbiology - Last 24 Hours (Table)











 12/21/21 14:55 Gram Stain - Final





 Sputum Sputum Culture - Final





    Staphylococcus aureus





    Candida albicans














Assessment and Plan


Assessment: 





Impression:


Acute hypoxic respiratory failure secondary COVID-19 pneumonia, patient received

monoclonal antibody infusion on 12/7, was not a candidate for Remdesivir the 

cause of his acute hypoxic respiratory failure, not a candidate for Baricitinib 

because of underlying bacterial infection possibility.  Patient was admitted to 

the ICU on 12/16 and intubated on 12/16 remains intubated and mechanically 

ventilated.  Patient developed ARDS secondary to COVID-19 pneumonia with 

relatively elevated plateau pressures and static pressures.  Remains on high 

PEEP.  And the relatively high FiO2,


Elevated pro calcitonin level, patient was treated empirically with Zosyn 

cultures have been nondiagnostic.  Now on cefepime empirically.  Zosyn is off.


Elevated inflammatory markers second COVID-19 pneumonia


Elevated d-dimer but negative workup for pulmonary embolism


New-onset atrial fibrillation with RVR on 12/22 requiring Cardizem drip and now 

on higher dose of Lovenox.  80 mg subcu twice a day.


History of fibromyalgia.


Lifetime nonsmoker.


Acute kidney injury secondary COVID-19 pneumonia improving


History of hypothyroidism


Deep tissue injury to Chin from prone position, no more prone positioning has 

been done.


Pneumomediastinum with subcutaneous emphysema in the neck.  This is a 

complication of COVID-19 infection.





Recommendation: Continue ventilatory support, remains on high PEEP of 20, FiO2 

is high at 70% rate is 32 volume 450.


Continue sedation and paralysis


Continue fentanyl


Continue Lasix 40 mg twice a day


Continue clevidipine, and titrate accordingly.


Continue enteral feeding/nutritional support.


Continue lactulose, Reglan was added today.


Continue to monitor inflammatory markers


Continue GI and DVT prophylaxis.  Patient is on Protonix and is also on 

Lovenox/therapeutic Lovenox.  BECAUSE of atrial fibrillation.


Continue to monitor x-rays ABGs and labs on a daily basis.


Overall prognosis remains extremely poor and guarded


Continue COVID-19 cocktail.


Critical care time is over 30 minutes





Time with Patient: Greater than 30

## 2021-12-24 NOTE — XR
EXAMINATION TYPE: XR chest 1V portable

 

DATE OF EXAM: 12/24/2021

 

CLINICAL HISTORY: Difficulty breathing progress study.  

 

TECHNIQUE: Single AP portable semiupright view of the chest is obtained.

 

COMPARISON: Chest x-ray from one day earlier and older studies

 

FINDINGS: Stable endotracheal and orogastric tubes. Stable left subclavian central venous catheter.

 

Persistent bilateral multifocal and confluent increased opacities greatest in the periphery redemonst
rated. Cardiac silhouette size is stable and within normal limits. Osseous structures are intact. Rig
ht-sided supraclavicular subcutaneous air is redemonstrated.

 

IMPRESSION: Bilateral multifocal and confluent increased opacities greatest in the periphery consiste
nt with covid-19 infection and/or fibrotic change are redemonstrated. No significant change from one 
day earlier.

## 2021-12-25 LAB
ALBUMIN SERPL-MCNC: 2.8 G/DL (ref 3.5–5)
ALP SERPL-CCNC: 161 U/L (ref 38–126)
ALT SERPL-CCNC: 144 U/L (ref 4–49)
ANION GAP SERPL CALC-SCNC: 4 MMOL/L
AST SERPL-CCNC: 81 U/L (ref 17–59)
BASOPHILS # BLD AUTO: 0.1 K/UL (ref 0–0.2)
BASOPHILS NFR BLD AUTO: 0 %
BUN SERPL-SCNC: 51 MG/DL (ref 9–20)
CALCIUM SPEC-MCNC: 8.8 MG/DL (ref 8.4–10.2)
CHLORIDE SERPL-SCNC: 104 MMOL/L (ref 98–107)
CO2 BLDA-SCNC: 43 MMOL/L (ref 19–24)
CO2 SERPL-SCNC: 39 MMOL/L (ref 22–30)
EOSINOPHIL # BLD AUTO: 0.1 K/UL (ref 0–0.7)
EOSINOPHIL NFR BLD AUTO: 0 %
ERYTHROCYTE [DISTWIDTH] IN BLOOD BY AUTOMATED COUNT: 3.82 M/UL (ref 4.3–5.9)
ERYTHROCYTE [DISTWIDTH] IN BLOOD: 13.7 % (ref 11.5–15.5)
FERRITIN SERPL-MCNC: 625 NG/ML (ref 22–322)
GLUCOSE BLD-MCNC: 126 MG/DL (ref 75–99)
GLUCOSE BLD-MCNC: 129 MG/DL (ref 75–99)
GLUCOSE BLD-MCNC: 149 MG/DL (ref 75–99)
GLUCOSE BLD-MCNC: 154 MG/DL (ref 75–99)
GLUCOSE BLD-MCNC: 159 MG/DL (ref 75–99)
GLUCOSE SERPL-MCNC: 125 MG/DL (ref 74–99)
HCO3 BLDA-SCNC: 41 MMOL/L (ref 21–25)
HCT VFR BLD AUTO: 37.7 % (ref 39–53)
HGB BLD-MCNC: 11.3 GM/DL (ref 13–17.5)
LDH SPEC-CCNC: 591 U/L (ref 313–618)
LYMPHOCYTES # SPEC AUTO: 0.8 K/UL (ref 1–4.8)
LYMPHOCYTES NFR SPEC AUTO: 5 %
MCH RBC QN AUTO: 29.4 PG (ref 25–35)
MCHC RBC AUTO-ENTMCNC: 29.9 G/DL (ref 31–37)
MCV RBC AUTO: 98.5 FL (ref 80–100)
MONOCYTES # BLD AUTO: 1 K/UL (ref 0–1)
MONOCYTES NFR BLD AUTO: 7 %
NEUTROPHILS # BLD AUTO: 12.1 K/UL (ref 1.3–7.7)
NEUTROPHILS NFR BLD AUTO: 86 %
PCO2 BLDA: 70 MMHG (ref 35–45)
PH BLDA: 7.38 [PH] (ref 7.35–7.45)
PLATELET # BLD AUTO: 313 K/UL (ref 150–450)
PO2 BLDA: 63 MMHG (ref 83–108)
POTASSIUM SERPL-SCNC: 4.5 MMOL/L (ref 3.5–5.1)
PROT SERPL-MCNC: 6.2 G/DL (ref 6.3–8.2)
SODIUM SERPL-SCNC: 147 MMOL/L (ref 137–145)
WBC # BLD AUTO: 14 K/UL (ref 3.8–10.6)

## 2021-12-25 RX ADMIN — INSULIN ASPART SCH: 100 INJECTION, SOLUTION INTRAVENOUS; SUBCUTANEOUS at 06:32

## 2021-12-25 RX ADMIN — CITALOPRAM HYDROBROMIDE SCH MG: 20 TABLET ORAL at 09:06

## 2021-12-25 RX ADMIN — INSULIN ASPART SCH UNIT: 100 INJECTION, SOLUTION INTRAVENOUS; SUBCUTANEOUS at 00:12

## 2021-12-25 RX ADMIN — METOPROLOL TARTRATE SCH MG: 25 TABLET, FILM COATED ORAL at 11:55

## 2021-12-25 RX ADMIN — SODIUM CHLORIDE SCH MLS/HR: 900 INJECTION, SOLUTION INTRAVENOUS at 13:53

## 2021-12-25 RX ADMIN — ALBUTEROL SULFATE SCH PUFF: 90 AEROSOL, METERED RESPIRATORY (INHALATION) at 08:36

## 2021-12-25 RX ADMIN — DEXTRAN 70 AND HYPROMELLOSE 2910 SCH DROPS: 1; 3 SOLUTION/ DROPS OPHTHALMIC at 05:59

## 2021-12-25 RX ADMIN — METOCLOPRAMIDE SCH MG: 5 INJECTION, SOLUTION INTRAMUSCULAR; INTRAVENOUS at 06:01

## 2021-12-25 RX ADMIN — CLEVIPIDINE SCH MLS/HR: 0.5 EMULSION INTRAVENOUS at 05:59

## 2021-12-25 RX ADMIN — SODIUM CHLORIDE SCH MLS/HR: 900 INJECTION, SOLUTION INTRAVENOUS at 20:42

## 2021-12-25 RX ADMIN — SODIUM CHLORIDE SCH MLS/HR: 900 INJECTION, SOLUTION INTRAVENOUS at 05:59

## 2021-12-25 RX ADMIN — METOPROLOL TARTRATE SCH MG: 25 TABLET, FILM COATED ORAL at 17:24

## 2021-12-25 RX ADMIN — METOCLOPRAMIDE SCH MG: 5 INJECTION, SOLUTION INTRAMUSCULAR; INTRAVENOUS at 14:05

## 2021-12-25 RX ADMIN — CISATRACURIUM BESYLATE SCH MLS/HR: 10 INJECTION INTRAVENOUS at 09:58

## 2021-12-25 RX ADMIN — CLEVIPIDINE SCH MLS/HR: 0.5 EMULSION INTRAVENOUS at 22:58

## 2021-12-25 RX ADMIN — PANTOPRAZOLE SODIUM SCH MG: 40 INJECTION, POWDER, FOR SOLUTION INTRAVENOUS at 09:07

## 2021-12-25 RX ADMIN — DEXTRAN 70 AND HYPROMELLOSE 2910 SCH DROPS: 1; 3 SOLUTION/ DROPS OPHTHALMIC at 23:22

## 2021-12-25 RX ADMIN — ENOXAPARIN SODIUM SCH MG: 80 INJECTION SUBCUTANEOUS at 20:37

## 2021-12-25 RX ADMIN — SODIUM CHLORIDE SCH MLS/HR: 900 INJECTION, SOLUTION INTRAVENOUS at 11:59

## 2021-12-25 RX ADMIN — CEFEPIME HYDROCHLORIDE SCH MLS/HR: 2 INJECTION, POWDER, FOR SOLUTION INTRAVENOUS at 20:30

## 2021-12-25 RX ADMIN — CEFEPIME HYDROCHLORIDE SCH MLS/HR: 2 INJECTION, POWDER, FOR SOLUTION INTRAVENOUS at 09:06

## 2021-12-25 RX ADMIN — FUROSEMIDE SCH MG: 10 INJECTION, SOLUTION INTRAMUSCULAR; INTRAVENOUS at 20:30

## 2021-12-25 RX ADMIN — OXYCODONE HYDROCHLORIDE AND ACETAMINOPHEN SCH MG: 500 TABLET ORAL at 09:07

## 2021-12-25 RX ADMIN — FUROSEMIDE SCH MG: 10 INJECTION, SOLUTION INTRAMUSCULAR; INTRAVENOUS at 09:06

## 2021-12-25 RX ADMIN — Medication SCH MCG: at 09:06

## 2021-12-25 RX ADMIN — SODIUM CHLORIDE SCH MLS/HR: 900 INJECTION, SOLUTION INTRAVENOUS at 14:59

## 2021-12-25 RX ADMIN — CHLORHEXIDINE GLUCONATE SCH ML: 1.2 RINSE ORAL at 09:06

## 2021-12-25 RX ADMIN — ENOXAPARIN SODIUM SCH MG: 80 INJECTION SUBCUTANEOUS at 09:06

## 2021-12-25 RX ADMIN — DEXTROSE SCH MG: 50 INJECTION, SOLUTION INTRAVENOUS at 09:06

## 2021-12-25 RX ADMIN — DEXTRAN 70 AND HYPROMELLOSE 2910 SCH DROPS: 1; 3 SOLUTION/ DROPS OPHTHALMIC at 12:39

## 2021-12-25 RX ADMIN — DILTIAZEM HYDROCHLORIDE SCH MLS/HR: 5 INJECTION INTRAVENOUS at 14:08

## 2021-12-25 RX ADMIN — METOCLOPRAMIDE SCH MG: 5 INJECTION, SOLUTION INTRAMUSCULAR; INTRAVENOUS at 23:19

## 2021-12-25 RX ADMIN — Medication SCH MG: at 09:06

## 2021-12-25 RX ADMIN — SODIUM CHLORIDE SCH MLS/HR: 900 INJECTION, SOLUTION INTRAVENOUS at 03:05

## 2021-12-25 RX ADMIN — INSULIN ASPART SCH UNIT: 100 INJECTION, SOLUTION INTRAVENOUS; SUBCUTANEOUS at 17:51

## 2021-12-25 RX ADMIN — SODIUM CHLORIDE SCH MLS/HR: 900 INJECTION, SOLUTION INTRAVENOUS at 17:48

## 2021-12-25 RX ADMIN — CLEVIPIDINE SCH MLS/HR: 0.5 EMULSION INTRAVENOUS at 11:59

## 2021-12-25 RX ADMIN — ALBUTEROL SULFATE SCH PUFF: 90 AEROSOL, METERED RESPIRATORY (INHALATION) at 19:47

## 2021-12-25 RX ADMIN — LACTULOSE SCH GM: 20 SOLUTION ORAL at 20:29

## 2021-12-25 RX ADMIN — INSULIN ASPART SCH UNIT: 100 INJECTION, SOLUTION INTRAVENOUS; SUBCUTANEOUS at 23:20

## 2021-12-25 RX ADMIN — INSULIN ASPART SCH: 100 INJECTION, SOLUTION INTRAVENOUS; SUBCUTANEOUS at 23:31

## 2021-12-25 RX ADMIN — DILTIAZEM HYDROCHLORIDE SCH MLS/HR: 5 INJECTION INTRAVENOUS at 05:14

## 2021-12-25 RX ADMIN — METOPROLOL TARTRATE SCH MG: 25 TABLET, FILM COATED ORAL at 20:37

## 2021-12-25 RX ADMIN — SODIUM CHLORIDE SCH MLS/HR: 900 INJECTION, SOLUTION INTRAVENOUS at 08:57

## 2021-12-25 RX ADMIN — INSULIN ASPART SCH UNIT: 100 INJECTION, SOLUTION INTRAVENOUS; SUBCUTANEOUS at 12:31

## 2021-12-25 RX ADMIN — GABAPENTIN SCH MG: 400 CAPSULE ORAL at 17:22

## 2021-12-25 RX ADMIN — CHLORHEXIDINE GLUCONATE SCH ML: 1.2 RINSE ORAL at 20:29

## 2021-12-25 RX ADMIN — METOCLOPRAMIDE SCH MG: 5 INJECTION, SOLUTION INTRAMUSCULAR; INTRAVENOUS at 17:22

## 2021-12-25 RX ADMIN — ALBUTEROL SULFATE SCH PUFF: 90 AEROSOL, METERED RESPIRATORY (INHALATION) at 12:16

## 2021-12-25 RX ADMIN — DILTIAZEM HYDROCHLORIDE SCH MLS/HR: 5 INJECTION INTRAVENOUS at 23:47

## 2021-12-25 RX ADMIN — METOCLOPRAMIDE SCH MG: 5 INJECTION, SOLUTION INTRAMUSCULAR; INTRAVENOUS at 00:04

## 2021-12-25 RX ADMIN — SODIUM CHLORIDE SCH MLS/HR: 900 INJECTION, SOLUTION INTRAVENOUS at 23:46

## 2021-12-25 RX ADMIN — DEXTRAN 70 AND HYPROMELLOSE 2910 SCH DROPS: 1; 3 SOLUTION/ DROPS OPHTHALMIC at 09:41

## 2021-12-25 RX ADMIN — LEVOTHYROXINE SODIUM SCH MCG: 50 TABLET ORAL at 06:00

## 2021-12-25 RX ADMIN — CISATRACURIUM BESYLATE SCH MLS/HR: 10 INJECTION INTRAVENOUS at 22:13

## 2021-12-25 RX ADMIN — CEFAZOLIN SCH MLS/HR: 330 INJECTION, POWDER, FOR SOLUTION INTRAMUSCULAR; INTRAVENOUS at 23:31

## 2021-12-25 RX ADMIN — DEXTRAN 70 AND HYPROMELLOSE 2910 SCH DROPS: 1; 3 SOLUTION/ DROPS OPHTHALMIC at 00:01

## 2021-12-25 RX ADMIN — DEXTRAN 70 AND HYPROMELLOSE 2910 SCH DROPS: 1; 3 SOLUTION/ DROPS OPHTHALMIC at 20:31

## 2021-12-25 RX ADMIN — DEXTROSE SCH MG: 50 INJECTION, SOLUTION INTRAVENOUS at 20:30

## 2021-12-25 RX ADMIN — DEXTRAN 70 AND HYPROMELLOSE 2910 SCH DROPS: 1; 3 SOLUTION/ DROPS OPHTHALMIC at 17:22

## 2021-12-25 RX ADMIN — GABAPENTIN SCH MG: 400 CAPSULE ORAL at 20:29

## 2021-12-25 RX ADMIN — LACTULOSE SCH GM: 20 SOLUTION ORAL at 09:06

## 2021-12-25 RX ADMIN — GABAPENTIN SCH MG: 400 CAPSULE ORAL at 09:07

## 2021-12-25 RX ADMIN — SODIUM CHLORIDE SCH MLS/HR: 900 INJECTION, SOLUTION INTRAVENOUS at 00:01

## 2021-12-25 NOTE — P.CRDCN
History of Present Illness


Consult date: 12/25/21


History of present illness: 


This is a 56-year-old gentleman was admitted to the hospital for treatment of 

COVID pneumonia.  Patient is intubated and mechanically ventilated.  A chest x-

ray continues to bilateral infiltrates.  Patient has been having intermittent 

atrial fibrillation with RVR.  Patient was initiated on IV Cardizem.  The dose 

was increased up to 15.  His blood pressure has been high.  His renal function 

is stable.  I'm going to initiate him on metoprolol 25 mg by mouth 3 times a 

day.  Once patient appears comfortable of dose of metoprolol, may consider 

cutting back on the dose of the Cardizem.  We'll get an echocardiogram to assess

LV function.  Rest of the management as per the intensivist and pulmonologist.  

Patient is also on Cleviprex for blood pressure control.  That also could be 

tapered down, once metoprolol was initiated.  Prognosis is guarded








Review of Systems





As per the chart





Past Medical History


Past Medical History: Fibromyalgia


Additional Past Medical History / Comment(s): Hypothyroid, born with facial 

droop, insomnia


History of Any Multi-Drug Resistant Organisms: None Reported


Past Surgical History: Orthopedic Surgery


Additional Past Surgical History / Comment(s): L ACL repair, total L knee 

arthroplasty, colonoscopy, laser eye surgery for vision correction.


Past Anesthesia/Blood Transfusion Reactions: No Reported Reaction


Smoking Status: Never smoker





- Past Family History


  ** Mother


Family Medical History: No Reported History





  ** Father


Family Medical History: Coronary Artery Disease (CAD), Hypertension





Medications and Allergies


                                Home Medications











 Medication  Instructions  Recorded  Confirmed  Type


 


Citalopram Hydrobromide 40 mg PO DAILY 12/09/21 12/09/21 History


 


Eszopiclone 3 mg PO HS PRN 12/09/21 12/09/21 History


 


Gabapentin 800 mg PO TID 12/09/21 12/09/21 History


 


Levothyroxine Sodium [Synthroid] 50 mcg PO DAILY 12/09/21 12/09/21 History


 


traMADol HCl [Ultram] 100 mg PO Q6HR PRN 12/09/21 12/09/21 History








                                    Allergies











Allergy/AdvReac Type Severity Reaction Status Date / Time


 


No Known Allergies Allergy   Verified 12/09/21 07:18














Physical Exam


Vitals: 


                                   Vital Signs











  Temp Pulse Resp Pulse Ox


 


 12/25/21 10:00   86  32 H  87 L


 


 12/25/21 09:30   101 H   87 L


 


 12/25/21 09:00  100.5 F H  98  32 H  88 L


 


 12/25/21 08:30   93   91 L


 


 12/25/21 08:00   103 H  32 H  89 L


 


 12/25/21 07:30   104 H  32 H  89 L


 


 12/25/21 07:00   100  32 H  88 L


 


 12/25/21 06:30   103 H  32 H  88 L


 


 12/25/21 06:00   105 H  32 H  89 L


 


 12/25/21 05:30   105 H  32 H  87 L


 


 12/25/21 05:00   105 H  32 H  88 L


 


 12/25/21 04:30   105 H  32 H  88 L


 


 12/25/21 04:00  98.8 F  101 H  32 H  87 L


 


 12/25/21 03:30   103 H  32 H  87 L


 


 12/25/21 03:00   104 H  32 H  87 L


 


 12/25/21 02:30   104 H  32 H  87 L


 


 12/25/21 02:00   105 H  32 H  87 L


 


 12/25/21 01:30   105 H  32 H  87 L


 


 12/25/21 01:00   106 H  32 H  87 L


 


 12/25/21 00:30   107 H  32 H  88 L


 


 12/25/21 00:00  101.3 F H  109 H  32 H  88 L


 


 12/24/21 23:30   106 H  32 H  87 L


 


 12/24/21 23:00   107 H  32 H  86 L


 


 12/24/21 22:30   109 H  36 H  85 L


 


 12/24/21 22:00   154 H  36 H  85 L


 


 12/24/21 21:30   100  36 H  86 L


 


 12/24/21 21:00   97  36 H  89 L


 


 12/24/21 20:30   97  36 H  83 L


 


 12/24/21 20:00  99.2 F  99  36 H  83 L


 


 12/24/21 19:30   98  36 H  84 L


 


 12/24/21 19:00  99.1 F  96   83 L


 


 12/24/21 18:00   96  32 H  85 L


 


 12/24/21 17:00   95  32 H  85 L


 


 12/24/21 16:00  99.1 F  94  32 H  86 L


 


 12/24/21 15:00   101 H  32 H  86 L


 


 12/24/21 14:00   110 H  32 H  87 L


 


 12/24/21 13:00   101 H  32 H  88 L


 


 12/24/21 12:00  99.1 F  100  32 H  88 L


 


 12/24/21 11:00   100  32 H  86 L








                                Intake and Output











 12/24/21 12/25/21 12/25/21





 22:59 06:59 14:59


 


Intake Total 1064.86 1264.455 354.906


 


Output Total 1480 1300 700


 


Balance -415.14 -35.545 -345.094


 


Intake:   


 


   184 192


 


    Cefepime 2 gm In Sodium   100





    Chloride 0.9% 100 ml @ 25   





    mls/hr IVPB Q12HR [facility] Rx   





    #:[id number]   


 


    Normal Saline 0.9 24 24 12





    Pressure Bag @ 3mL/hr   


 


    Sodium Chloride 0.9% 1, 160 160 80





    000 ml @ 20 mls/hr IV .   





    Q24H [facility] Rx#:[id number]   


 


  Intake, IV Titration 594.86 918.455 98.906





  Amount   


 


    Cisatracurium 200 mg In 21.56 200 





    Sodium Chloride 0.9% 180   





    ml @ 1 MCG/KG/MIN 6.668   





    mls/hr IV .Q24H [facility] Rx#:   





    [id number]   


 


    Clevidipine Butyrate 25 48.3 82.8 





    mg In Empty Bag 1 bag @ 1   





    MG/HR 2 mls/hr IV .Q24H   





    [facility] Rx#:[id number]   


 


    Diltiazem 125 mg In 125 125 





    Sodium Chloride 0.9% 100   





    ml @ 5 MG/HR 5 mls/hr IV   





    .Q24H [facility] Rx#:[id number]   


 


    fentaNYL (PF). 1,000 mcg 200 296.683 98.906





    In Sodium Chloride 0.9%   





    80 ml @ 0.5 MCG/KG/HR 5.   





    557 mls/hr IV .Q18H [facility]   





    Rx#:[id number]   


 


    propofoL 1,000 mg In 200 213.972 





    Empty Bag 1 bag @ Titrate   





    IV .Q0M [facility] Rx#:   





    [id number]   


 


  Tube Feeding 136 102 34


 


  Other 150 60 30


 


Output:   


 


  Urine 1480 1300 700


 


Other:   


 


  Voiding Method Indwelling Catheter Indwelling Catheter 








                         ABP, PAP, CO, CI - Last 8 Hours











Arterial Blood Pressure        174/74


 


Arterial Blood Pressure        116/66


 


Arterial Blood Pressure        152/67


 


Arterial Blood Pressure        139/61


 


Arterial Blood Pressure        135/57


 


Arterial Blood Pressure        137/59


 


Arterial Blood Pressure        130/55


 


Arterial Blood Pressure        127/54


 


Arterial Blood Pressure        140/57


 


Arterial Blood Pressure        126/55


 


Arterial Blood Pressure        157/61


 


Arterial Blood Pressure        154/61


 


Arterial Blood Pressure        151/60


 


Arterial Blood Pressure        148/59


 


Arterial Blood Pressure        143/60

















This patient is not personally examined.  Information is gathered from the 

nurses and by reviewing the chart





Results





                                 12/25/21 03:25





                                 12/25/21 03:25


                                 Cardiac Enzymes











  12/25/21 Range/Units





  03:25 


 


AST  81 H  (17-59)  U/L








                                       CBC











  12/25/21 Range/Units





  03:25 


 


WBC  14.0 H  (3.8-10.6)  k/uL


 


RBC  3.82 L  (4.30-5.90)  m/uL


 


Hgb  11.3 L  (13.0-17.5)  gm/dL


 


Hct  37.7 L  (39.0-53.0)  %


 


Plt Count  313  (150-450)  k/uL








                          Comprehensive Metabolic Panel











  12/25/21 Range/Units





  03:25 


 


Sodium  147 H  (137-145)  mmol/L


 


Potassium  4.5  (3.5-5.1)  mmol/L


 


Chloride  104  ()  mmol/L


 


Carbon Dioxide  39 H  (22-30)  mmol/L


 


BUN  51 H  (9-20)  mg/dL


 


Creatinine  1.08  (0.66-1.25)  mg/dL


 


Glucose  125 H  (74-99)  mg/dL


 


Calcium  8.8  (8.4-10.2)  mg/dL


 


AST  81 H  (17-59)  U/L


 


ALT  144 H  (4-49)  U/L


 


Alkaline Phosphatase  161 H  ()  U/L


 


Total Protein  6.2 L  (6.3-8.2)  g/dL


 


Albumin  2.8 L  (3.5-5.0)  g/dL








                               Current Medications











Generic Name Dose Route Start Last Admin





  Trade Name Freq  PRN Reason Stop Dose Admin


 


Albuterol Sulfate  2 puff  12/09/21 13:00  12/25/21 08:36





  Albuterol Hfa Inhaler  INHALATION   2 puff





  RT-TID [facility]   Administration


 


Artificial Tears  1 drops  12/19/21 04:00  12/25/21 09:41





  Artificial Tears-Hypromellose Drops 15 Ml Btl  BOTH EYES   1 drops





  Q4H [facility]   Administration


 


Ascorbic Acid  1,000 mg  12/09/21 11:30  12/25/21 09:07





  Ascorbic Acid 500 Mg Tab  PO   1,000 mg





  DAILY [facility]   Administration


 


Bisacodyl  10 mg  12/21/21 09:00  12/25/21 09:06





  Bisacodyl 10 Mg Supp  RECTAL   10 mg





  DAILY [facility]   Administration


 


Chlorhexidine Gluconate  15 ml  12/16/21 21:00  12/25/21 09:06





  Chlorhexidine Gluconate 15 Ml Cup  MUCOUS MEM   15 ml





  BID [facility]   Administration


 


Cholecalciferol  25 mcg  12/09/21 11:30  12/25/21 09:06





  Cholecalciferol 25 Mcg (1000 Iu) Tablet  PO   25 mcg





  DAILY [facility]   Administration


 


Citalopram Hydrobromide  40 mg  12/10/21 09:00  12/25/21 09:06





  Citalopram Hydrobromide 20 Mg Tab  PO   40 mg





  DAILY [facility]   Administration


 


Dexamethasone Sodium Phosphate  6 mg  12/19/21 09:00  12/25/21 09:06





  Dexamethasone Sod Phosphate 10 Mg/Ml 1 Ml Vial  IVP   6 mg





  DAILY [facility]   Administration


 


Enoxaparin Sodium  80 mg  12/23/21 21:00  12/25/21 09:06





  Enoxaparin 80 Mg/0.8 Ml Syringe  SQ   80 mg





  BID [facility]   Administration


 


Furosemide  40 mg  12/21/21 21:00  12/25/21 09:06





  Furosemide 10 Mg/Ml 4 Ml Vial  IV   40 mg





  Q12HR [facility]   Administration


 


Gabapentin  800 mg  12/09/21 16:00  12/25/21 09:07





  Gabapentin 400 Mg Cap  PO   800 mg





  TID [facility]   Administration


 


Propofol 1,000 mg/ IV Solution  100 mls @ 0 mls/hr  12/16/21 12:15  12/25/21 

09:58





  IV   50 mcg/kg/min





  .Q0M [facility]   36.45 mls/hr





    Administration





  Protocol  





  Titrate  


 


Fentanyl Citrate 1,000 mcg/  100 mls @ 5.557 mls/hr  12/16/21 12:15  12/25/21 

08:57





  Sodium Chloride  IV   3 mcg/kg/hr





  .Q18H [facility]   33.339 mls/hr





    Administration





  Protocol  





  0.5 MCG/KG/HR  


 


Cisatracurium Besylate 200 mg/  200 mls @ 6.668 mls/hr  12/16/21 12:15  12/25/21

 09:58





  Sodium Chloride  IV   2 mcg/kg/min





  .Q24H [facility]   13.336 mls/hr





    Administration





  Protocol  





  1 MCG/KG/MIN  


 


Sodium Chloride  1,000 mls @ 20 mls/hr  12/16/21 13:00  12/24/21 20:40





  Saline 0.9%  IV   20 mls/hr





  .Q24H [facility]   Administration


 


Clevidipine 25 mg/ IV Solution  50 mls @ 2 mls/hr  12/20/21 10:00  12/25/21 

05:59





  IV   4 mg/hr





  .Q24H [facility]   8 mls/hr





    Administration





  Protocol  





  1 MG/HR  


 


Diltiazem HCl 125 mg/ Sodium  125 mls @ 5 mls/hr  12/23/21 00:00  12/25/21 05:14





  Chloride  IV   15 mg/hr





  .Q24H [facility]   15 mls/hr





    Administration





  5 MG/HR  


 


Cefepime HCl 2 gm/ Sodium  100 mls @ 25 mls/hr  12/23/21 11:45  12/25/21 09:06





  Chloride  IVPB   25 mls/hr





  Q12HR [facility]   Administration


 


Acetaminophen 1,000 mg/ IV  100 mls @ 400 mls/hr  12/25/21 09:43 





  Solution  IVPB  12/26/21 06:14 





  Q6HR PRN  





  Fever  


 


Insulin Aspart  0 unit  12/19/21 06:00  12/25/21 06:32





  Insulin Aspart (Novolog) 100 Unit/Ml Vial  SQ   Not Given





  Q6H [facility]  





  Protocol  


 


Lactulose  20 gm  12/22/21 21:00  12/25/21 09:06





  Lactulose 20 Gm/30 Ml Cup  PO   20 gm





  BID [facility]   Administration


 


Levothyroxine Sodium  50 mcg  12/10/21 06:30  12/25/21 06:00





  Levothyroxine 50 Mcg Tab  PO   50 mcg





  0630 [facility]   Administration


 


Metoclopramide HCl  10 mg  12/18/21 12:00  12/25/21 06:01





  Metoclopramide 5 Mg/Ml 2 Ml Vial  IVP   10 mg





  Q6HR [facility]   Administration


 


Metoprolol Tartrate  25 mg  12/25/21 10:30 





  Metoprolol Tartrate 25 Mg Tab  PO  





  TID [facility]  


 


Pantoprazole Sodium  40 mg  12/17/21 09:00  12/25/21 09:07





  Pantoprazole 40 Mg/10 Ml Vial  IVP   40 mg





  DAILY [facility]   Administration


 


Zinc Sulfate  220 mg  12/09/21 11:30  12/25/21 09:06





  Zinc Sulfate 220 Mg Cap  PO   220 mg





  DAILY [facility]   Administration








                                Intake and Output











 12/24/21 12/25/21 12/25/21





 22:59 06:59 14:59


 


Intake Total 1064.86 1264.455 354.906


 


Output Total 1480 1300 700


 


Balance -415.14 -35.545 -345.094


 


Intake:   


 


   184 192


 


    Cefepime 2 gm In Sodium   100





    Chloride 0.9% 100 ml @ 25   





    mls/hr IVPB Q12HR [facility] Rx   





    #:[id number]   


 


    Normal Saline 0.9 24 24 12





    Pressure Bag @ 3mL/hr   


 


    Sodium Chloride 0.9% 1, 160 160 80





    000 ml @ 20 mls/hr IV .   





    Q24H [facility] Rx#:[id number]   


 


  Intake, IV Titration 594.86 918.455 98.906





  Amount   


 


    Cisatracurium 200 mg In 21.56 200 





    Sodium Chloride 0.9% 180   





    ml @ 1 MCG/KG/MIN 6.668   





    mls/hr IV .Q24H [facility] Rx#:   





    [id number]   


 


    Clevidipine Butyrate 25 48.3 82.8 





    mg In Empty Bag 1 bag @ 1   





    MG/HR 2 mls/hr IV .Q24H   





    [facility] Rx#:[id number]   


 


    Diltiazem 125 mg In 125 125 





    Sodium Chloride 0.9% 100   





    ml @ 5 MG/HR 5 mls/hr IV   





    .Q24H [facility] Rx#:[id number]   


 


    fentaNYL (PF). 1,000 mcg 200 296.683 98.906





    In Sodium Chloride 0.9%   





    80 ml @ 0.5 MCG/KG/HR 5.   





    557 mls/hr IV .Q18H [facility]   





    Rx#:[id number]   


 


    propofoL 1,000 mg In 200 213.972 





    Empty Bag 1 bag @ Titrate   





    IV .Q0M [facility] Rx#:   





    [id number]   


 


  Tube Feeding 136 102 34


 


  Other 150 60 30


 


Output:   


 


  Urine 1480 1300 700


 


Other:   


 


  Voiding Method Indwelling Catheter Indwelling Catheter 








                                        





                                 12/25/21 03:25 





                                 12/25/21 03:25 











EKG Interpretations (text)





Monitor shows sinus rhythm





Assessment and Plan


(1) Paroxysmal atrial fibrillation


Current Visit: Yes   Status: Acute   Code(s): I48.0 - PAROXYSMAL ATRIAL 

FIBRILLATION   SNOMED Code(s): 051362321


   





(2) COVID-19


Current Visit: Yes   Status: Acute   Code(s): U07.1 - COVID-19   SNOMED Code(s):

 783398515


   





(3) Hypoxia


Current Visit: Yes   Status: Acute   Code(s): R09.02 - HYPOXEMIA   SNOMED 

Code(s): 568188479


   


Plan: 


Start patient on metoprolol 25 mg by mouth twice a day.  Get an echocardiogram. 

 Continue anticoagulation

## 2021-12-25 NOTE — P.PN
Subjective


Progress Note Date: 12/25/21


Principal diagnosis: 





Acute hypoxic history failure secondary to COVID-19 pneumonia





On 12/22/2021 patient seen in follow-up in the intensive care unit, he remains 

intubated, sedated and paralyzed on assist-control mode of ventilation with a 

rate of 35, tidal M is 450, FiO2 65% and PEEP of 20, his peak air pressure is 

35, plateau pressure is 40.  This morning's blood gas shows pO2 of 61, pCO2 of 

66, and pH of 7.36, this was done on the above-mentioned vent settings.  Today's

chest x-ray has been reviewed showing stable diffuse bilateral infiltrates, and 

interval development of subcutaneous emphysema involving the soft tissues of the

neck, there is small amount of pneumomediastinum not excluded.  She is currently

on Diprivan at 50 mics per kilo per minute, fentanyl at 2.5 mics per kilo per 

minute, Nimbex is at 3 mics per kilo per minute, Cleviprex of the 5 mg per hour.

 He is tolerating tube feedings with vital HP at a rate of 15 with standard 

water flushes.  No fever or chills overnight, he is not requiring any 

vasopressor support, he is actually been a bit hypertensive requiring Cleviprex 

infusion, currently his blood pressure is better controlled and is 153/68 with a

mean of 92.  Patient has not been prone for last 48 hours related to development

of deep tissue injury on his face.  Today's labs show white blood cell count of 

14.9, hemoglobin of 12.8, platelet count is 449, sodium is 140, potassium is 

5.0, chloride is 102, CO2 36, B1 is 50, creatinine is 1.01, his AST is 41, ALT 

is 119, alk phos is 129.  His last set of inflammatory markers from yesterday 

showed LDH of 564, and CRP of 19.8, today's set of inflammatory markers is still

pending.  Patient has been receiving lactulose and Reglan, he has not had a 

bowel movement in several days, and we will increase his lactulose, she also 

remains on Lasix 40 mg twice daily however she is still does daily in negative 

fluid balance over the last 24 hours of -223 mL. 





Very today on 12/23/21, patient remains in the ICU, intubated and mechanically 

ventilated.  Patient is on assist control rate of 32 tidal volume 450 FiO2 70% 

PEEP is at 20.  ABG is marginal although the patient is on relatively high FiO2 

and high PEEP.  Patient has a pO2 of 57 pCO2 of 70 pH of 7.36.  His electrolytes

are normal renal profile showed a BUN of 50 creatinine 0.95.  WBC count is 10.7 

hemoglobin is 11.7.  Chest x-ray continues to show bilateral interstitial 

infiltrates consistent with COVID-19 pneumonia.  Patient is on multiple drips 

including Cleviprex at 3 mg per hour, he is on Cardizem at 15 mg per hour 

propofol at 50 fentanyl 3 mcg/kg/h Nimbex at 3 mcg/kg/m.  He is on 0.9 normal 

saline at KVO.  Yesterday the patient developed atrial fibrillation with RVR, 

and today I increased his Lovenox to 80 mg subcu twice a day, converted to sinus

rhythm and early this morning.  Patient is on enteral feeding.  It is up to 

goal.  Overall clinical status is extremely marginal.  Remains on cefepime 

empirically.  Remains on the COVID-19 cocktail.  Lovenox was increased to 80 mg 

subcu twice a day, Azopt Protonix 40 mg IV push daily.  And he is also on zinc. 

Lasix is given at 40 mg IV push every 12 hours.





Reevaluated today on 12/24/21, patient remains in the ICU intubated and 

mechanically ventilated.  Not showing much of an improvement over the last 24 

hours.  Remains on assist control rate of 32.  Tidal volume of 450 FiO2 70% PEEP

is still high/20.  ABG is marginal with a pO2 of 58 pCO2 of 69 pH of 7.39.  Vale

ent remains on propofol at 50 fentanyl 3 Nimbex at 2.5 , Cleviprex S3 milligrams

per hour.  His plateau pressure is 33 and peak airway pressure is in the high 

30s.  Dimer is 0.58 BUN is 48 creatinine 1.11.  There is 14.6 hemoglobin is 

12.1.  X-ray continues to show multifocal infiltrates consistent with COVID-19 

pneumonia.  Liver enzymes remain elevated with alkaline phosphatase of 149 ALT 

of 134 AST of 63.  Patient remains on Cardizem drip and he is now in sinus 

rhythm, his drip is at 5 mg per hour.  Remains on cefepime empirically.  Remains

on the COVID-19 cocktail.  Remains on Decadron 6 mg of push daily and Protonix 

as well as Lovenox 40 mg subcu daily.  Remains on Lasix 40 mg IV push twice a 

day.  He is also on lactulose 20 mg twice a day.  And Reglan was added today.  

No bowel movements in the last few days





Reevaluated today on 12/25/2021, patient remains in the ICU, intubated and 

mechanically ventilated.  Again he is not showing any signs of recovery or 

improvement.  Chest x-ray continues to show quite extensive infiltrates 

consistent with pneumonia and ARDS.  Remains on assist control rate of 32 tidal 

volume 450 FiO2 on the percent and PEEP of 20.  ABG showed a pO2 of 63 pCO2 of 

70 pH of 7.38.  Hence no changes were made on the ventilator settings and we 

plan to titrate FiO2 down to maintain O2 saturation above 89% if possible.  WBC 

count today is 14 hemoglobin is 11.3 sodium is 147 potassium 4.5 BUN is 51 

creatinine 1.08.  Liver enzymes are noted to be a bit elevated including AST of 

81 and ALT of 144.  Patient remains on multiple drips including fentanyl and 3 

mcg/kg/h Nimbex at 2 mcg/kg/m propofol at 50 mcg/kg/m Cleviprex at 4 mg per hour

Cardizem at 15 mg per hour and his IV fluid at KVO.  Patient is on enteral 

feeding.  Chest x-ray was reviewed and is basically about the same.  Up much of 

an improvement noted in the last few days

















Objective





- Vital Signs


Vital signs: 


                                   Vital Signs











Temp  100.5 F H  12/25/21 09:00


 


Pulse  100   12/25/21 11:00


 


Resp  32 H  12/25/21 11:00


 


BP  151/80   12/25/21 11:00


 


Pulse Ox  86 L  12/25/21 11:00








                                 Intake & Output











 12/24/21 12/25/21 12/25/21





 18:59 06:59 18:59


 


Intake Total 6412.506 2074.755 354.906


 


Output Total 2030 2030 700


 


Balance -246.129 -212.245 -345.094


 


Weight 121.5 kg  


 


Intake:   


 


   276 192


 


    Cefepime 2 gm In Sodium 100  100





    Chloride 0.9% 100 ml @ 25   





    mls/hr IVPB Q12HR [facility] Rx   





    #:[id number]   


 


    Normal Saline 0.9 36 36 12





    Pressure Bag @ 3mL/hr   


 


    Sodium Chloride 0.9% 1, 240 240 80





    000 ml @ 20 mls/hr IV .   





    Q24H [facility] Rx#:[id number]   


 


  Intake, IV Titration 6411.593 2153.755 98.906





  Amount   


 


    Cisatracurium 200 mg In 188.922 200 





    Sodium Chloride 0.9% 180   





    ml @ 1 MCG/KG/MIN 6.668   





    mls/hr IV .Q24H [facility] Rx#:   





    [id number]   


 


    Clevidipine Butyrate 25 39.267 131.1 





    mg In Empty Bag 1 bag @ 1   





    MG/HR 2 mls/hr IV .Q24H   





    [facility] Rx#:[id number]   


 


    Diltiazem 125 mg In 125 250 





    Sodium Chloride 0.9% 100   





    ml @ 5 MG/HR 5 mls/hr IV   





    .Q24H [facility] Rx#:[id number]   


 


    fentaNYL (PF). 1,000 mcg 391.682 396.683 98.906





    In Sodium Chloride 0.9%   





    80 ml @ 0.5 MCG/KG/HR 5.   





    557 mls/hr IV .Q18H [facility]   





    Rx#:[id number]   


 


    propofoL 1,000 mg In 400 213.972 





    Empty Bag 1 bag @ Titrate   





    IV .Q0M [facility] Rx#:   





    [id number]   


 


  Tube Feeding 173 170 34


 


  Other 90 180 30


 


Output:   


 


  Urine 2030 2030 700


 


Other:   


 


  Voiding Method Indwelling Catheter Indwelling Catheter 








                       ABP, PAP, CO, CI - Last Documented











Arterial Blood Pressure        143/64

















- Exam





GENERAL EXAM: Revealed 56-year-old white male intubated mechanically ventilated,

sedated and paralyzed.


HEAD: Normocephalic/atraumatic.  Cellulitis noted in the chin area related to 

recent prone position especially on the left side.


EYES: PERRLA, MI, nonicteric, no neck masses


NOSE: Clear with pink turbinates.


THROAT: No erythema or exudates.


NECK: No masses, no JVD, no thyroid enlargement, no adenopathy.


CHEST: Symmetrical chest expansion, crackles at the bases.


CVS: Regular rate and rhythm, normal S1 and S2, no gallops, no murmurs, no rubs


ABDOMEN: Soft, nontender.  No hepatosplenomegaly, normal bowel sounds, no 

guarding or rigidity.


EXTREMITIES: No clubbing, trace of bipedal edema, no cyanosis, 2+ pulses and 

upper and lower extremities.


SKIN: No rashes


CENTRAL NERVOUS SYSTEM: Sedated, paralyzed, unable to assess.








- Labs


CBC & Chem 7: 


                                 12/25/21 03:25





                                 12/25/21 03:25


Labs: 


                  Abnormal Lab Results - Last 24 Hours (Table)











  12/24/21 12/24/21 12/25/21 Range/Units





  11:43 18:13 00:09 


 


WBC     (3.8-10.6)  k/uL


 


RBC     (4.30-5.90)  m/uL


 


Hgb     (13.0-17.5)  gm/dL


 


Hct     (39.0-53.0)  %


 


MCHC     (31.0-37.0)  g/dL


 


Neutrophils #     (1.3-7.7)  k/uL


 


Lymphocytes #     (1.0-4.8)  k/uL


 


ABG pCO2     (35-45)  mmHg


 


ABG pO2     ()  mmHg


 


ABG HCO3     (21-25)  mmol/L


 


ABG Total CO2     (19-24)  mmol/L


 


ABG O2 Saturation     (94-97)  %


 


Sodium     (137-145)  mmol/L


 


Carbon Dioxide     (22-30)  mmol/L


 


BUN     (9-20)  mg/dL


 


Glucose     (74-99)  mg/dL


 


POC Glucose (mg/dL)  150 H  137 H  149 H  (75-99)  mg/dL


 


AST     (17-59)  U/L


 


ALT     (4-49)  U/L


 


Alkaline Phosphatase     ()  U/L


 


Total Protein     (6.3-8.2)  g/dL


 


Albumin     (3.5-5.0)  g/dL














  12/25/21 12/25/21 12/25/21 Range/Units





  01:17 03:25 03:25 


 


WBC    14.0 H  (3.8-10.6)  k/uL


 


RBC    3.82 L  (4.30-5.90)  m/uL


 


Hgb    11.3 L  (13.0-17.5)  gm/dL


 


Hct    37.7 L  (39.0-53.0)  %


 


MCHC    29.9 L  (31.0-37.0)  g/dL


 


Neutrophils #    12.1 H  (1.3-7.7)  k/uL


 


Lymphocytes #    0.8 L  (1.0-4.8)  k/uL


 


ABG pCO2  70 H    (35-45)  mmHg


 


ABG pO2  63 L    ()  mmHg


 


ABG HCO3  41 H*    (21-25)  mmol/L


 


ABG Total CO2  43 H    (19-24)  mmol/L


 


ABG O2 Saturation  92.8 L    (94-97)  %


 


Sodium   147 H   (137-145)  mmol/L


 


Carbon Dioxide   39 H   (22-30)  mmol/L


 


BUN   51 H   (9-20)  mg/dL


 


Glucose   125 H   (74-99)  mg/dL


 


POC Glucose (mg/dL)     (75-99)  mg/dL


 


AST   81 H   (17-59)  U/L


 


ALT   144 H   (4-49)  U/L


 


Alkaline Phosphatase   161 H   ()  U/L


 


Total Protein   6.2 L   (6.3-8.2)  g/dL


 


Albumin   2.8 L   (3.5-5.0)  g/dL














  12/25/21 Range/Units





  06:14 


 


WBC   (3.8-10.6)  k/uL


 


RBC   (4.30-5.90)  m/uL


 


Hgb   (13.0-17.5)  gm/dL


 


Hct   (39.0-53.0)  %


 


MCHC   (31.0-37.0)  g/dL


 


Neutrophils #   (1.3-7.7)  k/uL


 


Lymphocytes #   (1.0-4.8)  k/uL


 


ABG pCO2   (35-45)  mmHg


 


ABG pO2   ()  mmHg


 


ABG HCO3   (21-25)  mmol/L


 


ABG Total CO2   (19-24)  mmol/L


 


ABG O2 Saturation   (94-97)  %


 


Sodium   (137-145)  mmol/L


 


Carbon Dioxide   (22-30)  mmol/L


 


BUN   (9-20)  mg/dL


 


Glucose   (74-99)  mg/dL


 


POC Glucose (mg/dL)  126 H  (75-99)  mg/dL


 


AST   (17-59)  U/L


 


ALT   (4-49)  U/L


 


Alkaline Phosphatase   ()  U/L


 


Total Protein   (6.3-8.2)  g/dL


 


Albumin   (3.5-5.0)  g/dL








                      Microbiology - Last 24 Hours (Table)











 12/24/21 15:32 Wound Culture - Preliminary





 Face 


 


 12/21/21 14:55 Gram Stain - Final





 Sputum Sputum Culture - Final





    Staphylococcus aureus





    Candida albicans














Assessment and Plan


Assessment: 





Impression:


Acute hypoxic respiratory failure secondary COVID-19 pneumonia, patient received

monoclonal antibody infusion on 12/7, was not a candidate for Remdesivir the 

cause of his acute hypoxic respiratory failure, not a candidate for Baricitinib 

because of underlying bacterial infection possibility.  Patient was admitted to 

the ICU on 12/16 and intubated on 12/16 remains intubated and mechanically 

ventilated.  Patient developed ARDS secondary to COVID-19 pneumonia with 

relatively elevated plateau pressures and static pressures.  Remains on high 

PEEP. high FiO2,


Elevated pro calcitonin level, patient was treated empirically with Zosyn 

cultures have been nondiagnostic.  Now on cefepime empirically.  Zosyn is off.


Elevated inflammatory markers second COVID-19 pneumonia


Elevated d-dimer but negative workup for pulmonary embolism


New-onset atrial fibrillation with RVR on 12/22 requiring Cardizem drip and now 

on higher dose of Lovenox.  80 mg subcu twice a day.


History of fibromyalgia.


Lifetime nonsmoker.


Acute kidney injury secondary COVID-19 pneumonia improving


History of hypothyroidism


Deep tissue injury to Chin from prone position, no more prone positioning has 

been done.


Pneumomediastinum with subcutaneous emphysema in the neck.  This is a 

complication of COVID-19 infection.





Recommendation: Continue ventilatory support, remains on high PEEP of 20, FiO2 

is high at 100% rate is 32 volume 450.


Continue sedation and paralysis


Continue fentanyl


Continue Lasix 40 mg twice a day


Continue clevidipine, and titrate accordingly.


Continue enteral feeding/nutritional support.


Continue lactulose, and Reglan


Continue to monitor inflammatory markers


Continue GI and DVT prophylaxis.  Patient is on Protonix and is also on 

Lovenox/therapeutic Lovenox.


Overall prognosis remains extremely poor and guarded


Continue COVID-19 cocktail.


Critical care time is over 30 minutes





Time with Patient: Greater than 30

## 2021-12-25 NOTE — P.PN
Subjective


Progress Note Date: 12/25/21





History of present illness


56-year-old  male with past medical history of fibromyalgia comes in to

emergency room with symptoms of nausea, vomiting, dizziness and sweating feeling

weak in with runny nose loss of taste and hence now for the past 1 week.  He 

presented on 12/7 was found to have positive COVID results.  Received his 

monoclonal antibodies.  He Came back to the emergency room because of worsening 

of symptoms.Vitals reviewed patient afebrile pulse 80 respiratory rate 20 blood 

pressure 138/82 saturating at 88% on 10 L labs reviewed WBC 6.6 hemoglobin 13.9 

d-dimer is elevated at 0.89, sodium 135 bicarb 21 BUN 16 creatinine 1.3 glucose 

187 ferritin 2822 AST 89 ALT 80 alkaline phosphatase 225 LDH 1091 and CRP 21 and

albumin 3.3


X-ray suggestive of bilateral patchy pneumonia.  Both interstitial and airspace 

infiltrate


Pulmonary clinic consulted for COVID pneumonia


Patient placed on Lovenox 40 subcu twice a day, vitamin C, D and zinc.  

Dexamethasone initiated at 6 mg twice a day IV


Patient is a candidate of Baritinib and will discuss about initiating it with 

pulmonary


Venous Doppler ordered





12/10: Patient is seen on the [facility] floor in follow-up.  He continues to have 

dyspnea and is on 15 L high flow nasal cannula and nonrebreather with pulse ox 

of 87 and 95%.  He's been afebrile, heart rate in the 60s and 70s, blood 

pressure 127/73.  Pro-calcitonin came back high at 9.75 and we started the 

patient on Zosyn and vancomycin for bacterial pneumonia coverage.  Patient has 

coarse crackles bilaterally.  No lower extremity edema.  He has been seen and 

followed by pulmonary medicine.  Patient is not a candidate for Baricitinib.  

Patient is continued on Decadron, Lovenox and vitamin supplements.





12/11: Is found sitting up in the edge of the bed.  He is currently not on a 

Ventimask.  However his pulse oxing still around 88 to 91 % on Ventimask or 15 L

high flow nasal cannula. Patient continues to have dyspnea with activity and 

speaking. Patient states that he is feeling much better compared to yesterday.  

Continues to have a cough.  No lower extremity edema.  He has rhonchi to the 

bases bilaterally.  He is currently on Decadron Lovenox and vitamin supplements.

 Patient remains afebrile, heart rate 62, respirations 17, blood pressure 118/68

pulse ox 93% on 15 L high flow nasal cannula





12/12: Patient was up to the bathroom showering today.  Awaiting inflammation 

markers today.  Patient still requires 10 L of O2 with a pulse ox of 90%.  

Patient continues to have crackles to the bilateral bases.  Dyspnea with 

activity and speaking.  Patient states he is feeling better however he is very 

tired.  Continues to have cough no lower extremity edema.  He still currently on

Decadron, Lovenox, and vitamin supplements.  Patient remains afebrile, heart 

rate 58, respirations 17, blood pressure 118/89,





12/13: Patient is still on high flow nasal cannula 15 L and nonrebreather with 

pulse ox of 89 and 99%.  He's been afebrile, heart rate 73, blood pressure 

120/72.  Creatinine 1.06.  Blood sugars running between 101 and 106.  Sputum 

culture is in progress.  Patient is followed closely by pulmonary medicine.  

Patient is continued on dexamethasone, Lovenox, Zosyn and vitamin supplements.  

Patient has been encouraged to prone several hours daily.





12/14: Patient remains on nonrebreather mask and AirVo at 60 L, FiO2 of 85 with 

pulse ox of 88%.  Appears to have more difficulty with breathing.  Repeat chest 

x-ray reveals stable bilateral infiltrates.  He has been afebrile, heart rate 

59, blood pressure 102/59.  Repeat d-dimer 1.53.  Blood sugars are well 

controlled.





12/15: Patient remains on nonrebreather and AirVo with pulse ox at 90%.  Patient

was agreeable.  He has been afebrile, heart rate 70, blood pressure 131/79.  CBG

running between 97 and 116 and CBGs and insulin will be discontinued.  Sputum 

cultures positive for Candida albicans.  Patient is increasing his activity, 

currently laying in bed on his side.  Lab work including inflammatory markers 

ordered for tomorrow.





12/16: Patient had a drop in his pulse ox and 78% with nonrebreather and AirVo 

and A-Team was called.  Patient stated that his shortness of breath was better 

than the night before.  Stat chest x-ray was done which revealed bilateral 

multifocal peripheral increased opacities consistent with Covid 19 infection are

redemonstrated.  No significant change.  Patient transitioned to BiPAP with 

pulse ox of 90%.


Patient verbalized that he wanted to be a no CODE STATUS and was encouraged to 

discuss with family.  It was determined the patient will be transferred to the 

intensive care unit most likely be intubated.  Patient was very resistant but 

after long discussion with Dr. Austin wheeler, patient agreed to intubation and

full CODE STATUS at this point.  Repeat blood work reveals WBC 9.4, hemoglobin 

12.7, platelet count 263.  D-dimer 1.31.  Blood sugars





12/17: Patient was transferred into the intensive care unit yesterday and 

subsequently intubated and placed on mechanical ventilation, tidal volume 450, 

FiO2 100, PEEP of 20.  Patient was prone this morning switched over to supine.  

He was started on tube feedings yesterday but had a high residual this morning 

after pronating.  He is currently on fentanyl, propofol, Nimbex drip.  One amp 

of bicarbonate given this morning.


Chest x-ray reveals lateral multifocal confluent and increased opacities 

greatest in the periphery consistent with Covid 19 infection redemonstrated.  No

sicca be unchanged from one day earlier.  


CTA of the chest done yesterday reveals suboptimal study without saddle central 

pulmonary embolism.  Cannot exclude smaller pulmonary emboli.  Bilateral 

multifocal and confluent groundglass opacities and organizing consolidations 

consistent with known Covid 19 infection.


Repeat blood work reveals WBC 13.1, hemoglobin 13.1, platelet count 381.  D-

dimer 1.17.  Sodium 135, potassium 5.2, chloride 104, CO2 24, BUN 29 and 

creatinine 0.78.  Capillary blood glucose running between 116 and 150s.  AST 

216.  C-reactive protein 7.9, pro-calcitonin 0.15.





12/18: This is day #9 of admission, patient remains in ICU, still mechanically 

ventilated for hypoxemia, starting 12/17/2021, on Dickerson assist control, rate 32,

PEEP of 20, FiO2 100%, dexamethasone 6 mg daily, electrolytes are stable, BUN 29

creatinine 0.84, x-ray shows bilateral multifocal opacities, consistent with 

Covid pneumonia, CT angiogram negative for PE, patient is on propofol, fentanyl 

and index vital HP, at 20 mL an hour 





12/19: Patient remains in ICU, intubated, NG tube in place, chest x-ray shows no

pneumothorax, no pleural effusion, there is bilateral patchy pulmonary 

interstitial infiltrates, with coalescent density in the left lower lobe.  No 

significant change from yesterdayALT iscreatinine is 0.75, ALT is trending 

downward, C reactive protein is 8.0 from a previous of 8.9Lasix given today, on 

dexamethasone, and  remains sedated.





12/20: Patient remains in the intensive care unit, intubated and on mechanical 

ventilation with FiO2 60% and PEEP of 20 with pulse ox of 87%.  Patient is being

prone for 16 hours per day.  He is tolerating tube feedings.  He has been 

started on lactulose this morning for constipation.  He has been afebrile, heart

rate 75, respiratory rate in the low 30s, blood pressure 148/65.  Patient was 

started on Clevidipine gtt. he is also on propofol, fentanyl and Nimbex.  No new

concerns, patient seems to be fairly stable.





12/21: Patient remains in intensive care unit intubated and on mechanical 

ventilation with FiO2 of 65, tidal volume 450, PEEP of 20.  Patient is being 

managed by the intensivist.  Patient has not been prone today due to skin 

breakdown on his face.  He is on tube feedings and tolerating.  Patient has been

afebrile, heart rate 102, blood pressure 151/71, pulse ox 85-89%.  Repeat blood 

work reveals WBC 13.5, hemoglobin 12.2.  Electrolytes normal.  BUN 47 creatinine

1.05.  .  C-reactive protein 19.8.  D-dimer 1.09.  The patient did not 

have a bowel movement after lactulose yesterday which is been repeated and 

Dulcolax suppository ordered for today.  





12/22, patient remains in ICU, intubated and mechanically vented, is starting to

have skin excoriations, in the face and with deep tissue injury chin area, from 

pronating position, for that reason, they have avoided pronating since 

yesterday.  Patient's currently sedated, and is receiving fentanyl, and nimbex 

clevipres. ngt tolerated at 50 cch/r vital hp, inflammatory markers are still 

elevated, with LDH of 58, CRP of 19, still with bowel issues, they have 

increased lactulose to 20 mg twice a day, and Dulcolax suppository daily.  No 

plans for  weaning off the vent trials at this time, probably might end up in a 

trach PEG .





12/23 patient went into A. fib RVR last night, for which they have started him 

on amiodarone, which did not help, patient therefore was started on Cardizem 

drip, currently at 15 mics, still no bowel movement, despite Dulcolax and 

lactulose, we'll going to do half dose MiraLAX a day, and may repeat in 12 

hours, patient is still no vent, with PEEP of 21





12/24 patient in ICU, still mechanical ventilation, closely followed by 

pulmonary intensivist not much improvement, over the past 48 hours, BP still 

high at 21, still requires sedation, x-rays continue to show multifocal 

infiltrates, patient remains on his IV antibiotics cefepime, empiric treatments,

Decadron 6 mg daily, and Lovenox shot.  Lasix 40 mg every 12 hours patient still

has low-grade temp, between 99.6 T-max still tachypneic.  Pulse ox 85% FiO2 70% 

on vent








12/25 Patient remains in ICU still mechanically ventilated on current settings 

of respiratory rate 32, tidal volume 450 on FiO2 100% with PEEP of 20.  Patient 

has low-grade temp this morning 100.8 and a high of 101.3 last night, pulse of 

87 respiratory rate 32 blood pressure 130/76 oxygen saturation 8700% FiO2.  

White cell count this morning is a W BC O 14 hemoglobin 1311.3 ABG drawn at 100%

FiO2 soled suggest a pH of 7.38E CO2 of 74-63 bicarb 41 sodium 147 potassium 4.5

bicarb 39 BUN 51 creatinine 1. 8 glucose stable at 125 liver enzymes gradually 

getting evaluated with AST 81  alkaline phosphatase of 161 sputum 

cultures suggestive of staph aureus and Candida albicans stop chest x-ray this 

morning suggests a cathetered bilateral airspace and interstitial opacities 

likely worsened in the lower lungs  no pneumothorax or large effusion.  Patient 

was noted to be in intermittent atrial fibrillation with RVR as nature.  

Cardizem currently increased to 15 mics per KG.  Patient currently in sinus r

hythm.  Cardiology consulted initiated patient on metoprolol 25 3 times a day.  

Echocardiogram ordered.  Patient remains on multiple drips including fentanyl, 

Pneumovax, propofol, TB Prax, Cardizem.  Patient is a poor prognosis at this 

point.  On pulmonary with try to titrate down the FiO2 to keep above 90%.  

Wishes continued to remain on antibiotics with cefepime.  We will consult 

infectious disease.  Continue Lasix at 40 IV every 12 hours.  Inflammation 

markers repeat including LDH, ferritin, proBNP and CRP.  





ROS


Unable to obtain due to intubation





Physical exam


General Appearance: Patient is laying in the ICU bed, intubated and on mechan

ical ventilation.  No acute distress.  Patient appears to be comfortable. 


Full examination deferred to intensive this due to intubation and Covid 19.





Assessment and plan


1. Acute hypoxic respiratory failure secondary to Covid 19 pneumonia.  Consult 

with pulmonary medicine appreciated.  Patient was intubated and placed on 

mechanical ventilation 12/16.  Continue care in the intensive care unit, 

proning, currently on fentanyl, propofol and Nimbex. patient on cefepime 2 g 

every 12 hours 





2. Acute COVID pneumonia. Dexamethasone 6 mg IV increased to twice daily,  

Lovenox 40 subcu daily history T twice daily, Not a candidate for Baricitinib, 

Monitor inflammatory markers. Continue supplements vitamin C, D and zinc to 

implementing markers ordered possible bacterial in position since staph aureus 

growing on the cultures.  Candida albicans noted on the culture 





3. Acute transaminitis Secondary to viral inflammation. Continue monitoring





4. CKD stage 3.  Continue to monitor patient's creatinine.  No acute kidney 

injury





5. History of fibromyalgia. continue gabapentin 800 3 times a day, Citalopram 40

mg by mouth daily





6. Insomnia. Was on Eszopiclone 3 mg daily at bedtime 





7. Hypothyroidism. Levothyroxine 50 g by mouth daily





8.  Hypertension.  Patient started on  Clevidipine gtt. 





9.  DVT prophylaxis. Lovenox 40 subcu daily.





10. GI prophylaxis.  Protonix 40 mg IV push daily





11.  Hyperglycemia secondary to steroids, IV drips, patient is on NovoLog scale.





12.  Constipation.  Lactulose 20 g daily and Dulcolax suppository today MiraLAX.





13 atrial fibrillation with RVR on Cardizem 15 g by mouth daily with metoprolol

50 3 times a day 


Prognosis guarded.





Status: Full code





Discharge Plan: 


To be determined.





Objective





- Vital Signs


Vital signs: 


                                   Vital Signs











Temp  100.5 F H  12/25/21 09:00


 


Pulse  100   12/25/21 11:00


 


Resp  32 H  12/25/21 11:00


 


BP  151/80   12/25/21 11:00


 


Pulse Ox  86 L  12/25/21 11:00








                                 Intake & Output











 12/24/21 12/25/21 12/25/21





 18:59 06:59 18:59


 


Intake Total 2848.406 8467.755 576.414


 


Output Total 2030 2030 700


 


Balance -246.129 -212.245 -123.586


 


Weight 121.5 kg  


 


Intake:   


 


   276 192


 


    Cefepime 2 gm In Sodium 100  100





    Chloride 0.9% 100 ml @ 25   





    mls/hr IVPB Q12HR [facility] Rx   





    #:[id number]   


 


    Normal Saline 0.9 36 36 12





    Pressure Bag @ 3mL/hr   


 


    Sodium Chloride 0.9% 1, 240 240 80





    000 ml @ 20 mls/hr IV .   





    Q24H [facility] Rx#:[id number]   


 


  Intake, IV Titration 6186.928 2043.755 320.414





  Amount   


 


    Cisatracurium 200 mg In 188.922 200 





    Sodium Chloride 0.9% 180   





    ml @ 1 MCG/KG/MIN 6.668   





    mls/hr IV .Q24H [facility] Rx#:   





    [id number]   


 


    Clevidipine Butyrate 25 39.267 131.1 48





    mg In Empty Bag 1 bag @ 1   





    MG/HR 2 mls/hr IV .Q24H   





    [facility] Rx#:[id number]   


 


    Diltiazem 125 mg In 125 250 





    Sodium Chloride 0.9% 100   





    ml @ 5 MG/HR 5 mls/hr IV   





    .Q24H [facility] Rx#:[id number]   


 


    fentaNYL (PF). 1,000 mcg 391.682 396.683 198.906





    In Sodium Chloride 0.9%   





    80 ml @ 0.5 MCG/KG/HR 5.   





    557 mls/hr IV .Q18H [facility]   





    Rx#:[id number]   


 


    propofoL 1,000 mg In 400 213.972 73.508





    Empty Bag 1 bag @ Titrate   





    IV .Q0M [facility] Rx#:   





    [id number]   


 


  Tube Feeding 173 170 34


 


  Other 90 180 30


 


Output:   


 


  Urine 2030 2030 700


 


Other:   


 


  Voiding Method Indwelling Catheter Indwelling Catheter 








                       ABP, PAP, CO, CI - Last Documented











Arterial Blood Pressure        143/64

















- Labs


CBC & Chem 7: 


                                 12/25/21 03:25





                                 12/25/21 03:25


Labs: 


                  Abnormal Lab Results - Last 24 Hours (Table)











  12/24/21 12/25/21 12/25/21 Range/Units





  18:13 00:09 01:17 


 


WBC     (3.8-10.6)  k/uL


 


RBC     (4.30-5.90)  m/uL


 


Hgb     (13.0-17.5)  gm/dL


 


Hct     (39.0-53.0)  %


 


MCHC     (31.0-37.0)  g/dL


 


Neutrophils #     (1.3-7.7)  k/uL


 


Lymphocytes #     (1.0-4.8)  k/uL


 


ABG pCO2    70 H  (35-45)  mmHg


 


ABG pO2    63 L  ()  mmHg


 


ABG HCO3    41 H*  (21-25)  mmol/L


 


ABG Total CO2    43 H  (19-24)  mmol/L


 


ABG O2 Saturation    92.8 L  (94-97)  %


 


Sodium     (137-145)  mmol/L


 


Carbon Dioxide     (22-30)  mmol/L


 


BUN     (9-20)  mg/dL


 


Glucose     (74-99)  mg/dL


 


POC Glucose (mg/dL)  137 H  149 H   (75-99)  mg/dL


 


AST     (17-59)  U/L


 


ALT     (4-49)  U/L


 


Alkaline Phosphatase     ()  U/L


 


Total Protein     (6.3-8.2)  g/dL


 


Albumin     (3.5-5.0)  g/dL














  12/25/21 12/25/21 12/25/21 Range/Units





  03:25 03:25 06:14 


 


WBC   14.0 H   (3.8-10.6)  k/uL


 


RBC   3.82 L   (4.30-5.90)  m/uL


 


Hgb   11.3 L   (13.0-17.5)  gm/dL


 


Hct   37.7 L   (39.0-53.0)  %


 


MCHC   29.9 L   (31.0-37.0)  g/dL


 


Neutrophils #   12.1 H   (1.3-7.7)  k/uL


 


Lymphocytes #   0.8 L   (1.0-4.8)  k/uL


 


ABG pCO2     (35-45)  mmHg


 


ABG pO2     ()  mmHg


 


ABG HCO3     (21-25)  mmol/L


 


ABG Total CO2     (19-24)  mmol/L


 


ABG O2 Saturation     (94-97)  %


 


Sodium  147 H    (137-145)  mmol/L


 


Carbon Dioxide  39 H    (22-30)  mmol/L


 


BUN  51 H    (9-20)  mg/dL


 


Glucose  125 H    (74-99)  mg/dL


 


POC Glucose (mg/dL)    126 H  (75-99)  mg/dL


 


AST  81 H    (17-59)  U/L


 


ALT  144 H    (4-49)  U/L


 


Alkaline Phosphatase  161 H    ()  U/L


 


Total Protein  6.2 L    (6.3-8.2)  g/dL


 


Albumin  2.8 L    (3.5-5.0)  g/dL














  12/25/21 Range/Units





  12:16 


 


WBC   (3.8-10.6)  k/uL


 


RBC   (4.30-5.90)  m/uL


 


Hgb   (13.0-17.5)  gm/dL


 


Hct   (39.0-53.0)  %


 


MCHC   (31.0-37.0)  g/dL


 


Neutrophils #   (1.3-7.7)  k/uL


 


Lymphocytes #   (1.0-4.8)  k/uL


 


ABG pCO2   (35-45)  mmHg


 


ABG pO2   ()  mmHg


 


ABG HCO3   (21-25)  mmol/L


 


ABG Total CO2   (19-24)  mmol/L


 


ABG O2 Saturation   (94-97)  %


 


Sodium   (137-145)  mmol/L


 


Carbon Dioxide   (22-30)  mmol/L


 


BUN   (9-20)  mg/dL


 


Glucose   (74-99)  mg/dL


 


POC Glucose (mg/dL)  154 H  (75-99)  mg/dL


 


AST   (17-59)  U/L


 


ALT   (4-49)  U/L


 


Alkaline Phosphatase   ()  U/L


 


Total Protein   (6.3-8.2)  g/dL


 


Albumin   (3.5-5.0)  g/dL








                      Microbiology - Last 24 Hours (Table)











 12/24/21 15:32 Wound Culture - Preliminary





 Face 


 


 12/21/21 14:55 Gram Stain - Final





 Sputum Sputum Culture - Final





    Staphylococcus aureus





    Candida albicans

## 2021-12-25 NOTE — P.CONS
History of Present Illness





- Reason for Consult


Consult date: 12/25/21


fever/sepsis


Requesting physician: Cy Carrillo





- Chief Complaint


Shortness of breath x few days





- History of Present Illness


Patient is a 56 year  male with a past medical history significant for 

fibromyalgia in this patient presented to the hospital about 16 days ago for 

evaluation of nausea vomiting did diarrhea dizziness and sweating in this 

patient who was diagnosed with COVID-19 on 12/07/2021 and the patient received 

monoclonal antibodies subsequently was discharged home a she subsequently 

presented back to the hospital within 24 hours with worsening symptoms patient 

was noticed to be hypoxic requiring 10 L high flow oxygen, patient initial x-ray

today shows multifocal pneumonia secondary COVID-19 or the patient has been 

managed by pulmonary patient was initially on the floor however did have most of

his respiratory status and ended up getting Intubated on 12/16/2021, patient did

received vancomycin initially on December 10 and 11 and also received Zosyn from

December 10 in the 16th, the patient was on no antibiotics from 16 to the 23rd 

the patient was started on cefepime per pulmonary patient has been afebrile 

throughout his hospital stay, except a fever of 101F around midnight and a 

fever 100.5F this morning that has prompted this infectious disease 

consultation, patient is currently on the vent for almost 9 days now.  No 

significant purulent secretions with ET and no diarrhea has been reported by the

nursing staff, patient is not requiring any pressor support at the moment, 

patient did have the mildly elevated white count of 14,000, creatinine has 

normal liver enzymes are mildly elevated patient did not have any blood culture 

obtained some morning he did have a sputum collected on the 21st which is 

growing MSSA and Candida all of this information has been obtained from review 

the chart, patient did have a right wrist art line and a PICC line both of them 

has been placed on December 16 when the patient got indurated, and the patient 

is currently sedated on the vent and can reported history








Review of Systems


Positive points has been mentioned in HPI complete review could not be obtained 

because of his underlying mental status








Past Medical History


Past Medical History: Fibromyalgia


Additional Past Medical History / Comment(s): Hypothyroid, born with facial 

droop, insomnia


History of Any Multi-Drug Resistant Organisms: None Reported


Past Surgical History: Orthopedic Surgery


Additional Past Surgical History / Comment(s): L ACL repair, total L knee 

arthroplasty, colonoscopy, laser eye surgery for vision correction.


Past Anesthesia/Blood Transfusion Reactions: No Reported Reaction


Smoking Status: Never smoker





- Past Family History


  ** Mother


Family Medical History: No Reported History





  ** Father


Family Medical History: Coronary Artery Disease (CAD), Hypertension





Medications and Allergies


                                Home Medications











 Medication  Instructions  Recorded  Confirmed  Type


 


Citalopram Hydrobromide 40 mg PO DAILY 12/09/21 12/09/21 History


 


Eszopiclone 3 mg PO HS PRN 12/09/21 12/09/21 History


 


Gabapentin 800 mg PO TID 12/09/21 12/09/21 History


 


Levothyroxine Sodium [Synthroid] 50 mcg PO DAILY 12/09/21 12/09/21 History


 


traMADol HCl [Ultram] 100 mg PO Q6HR PRN 12/09/21 12/09/21 History








                                    Allergies











Allergy/AdvReac Type Severity Reaction Status Date / Time


 


No Known Allergies Allergy   Verified 12/09/21 07:18














Physical Exam


Vitals: 


                                   Vital Signs











  Temp Pulse Resp BP Pulse Ox


 


 12/25/21 18:00   85  32 H  126/73  87 L


 


 12/25/21 17:30   90  32 H   87 L


 


 12/25/21 17:00   92  32 H   87 L


 


 12/25/21 16:30   90  32 H   87 L


 


 12/25/21 16:00  100.5 F H  90  32 H   87 L


 


 12/25/21 15:30   90  32 H  121/68  87 L


 


 12/25/21 15:00   90  32 H   88 L


 


 12/25/21 14:30   89    88 L


 


 12/25/21 14:00   87  32 H  130/76  87 L


 


 12/25/21 13:30   84    87 L


 


 12/25/21 13:00   82    86 L


 


 12/25/21 12:30   89   130/76  88 L


 


 12/25/21 12:00  100.8 F H  86  32 H   88 L


 


 12/25/21 11:30   102 H   151/80  87 L


 


 12/25/21 11:00   100  32 H  151/80  86 L


 


 12/25/21 10:30   110 H    85 L


 


 12/25/21 10:00   86  32 H   87 L


 


 12/25/21 09:30   101 H    87 L


 


 12/25/21 09:00  100.5 F H  98  32 H   88 L


 


 12/25/21 08:30   93    91 L


 


 12/25/21 08:00   103 H  32 H   89 L


 


 12/25/21 07:30   104 H  32 H   89 L


 


 12/25/21 07:00   100  32 H   88 L


 


 12/25/21 06:30   103 H  32 H   88 L


 


 12/25/21 06:00   105 H  32 H   89 L


 


 12/25/21 05:30   105 H  32 H   87 L


 


 12/25/21 05:00   105 H  32 H   88 L


 


 12/25/21 04:30   105 H  32 H   88 L


 


 12/25/21 04:00  98.8 F  101 H  32 H   87 L


 


 12/25/21 03:30   103 H  32 H   87 L


 


 12/25/21 03:00   104 H  32 H   87 L


 


 12/25/21 02:30   104 H  32 H   87 L


 


 12/25/21 02:00   105 H  32 H   87 L


 


 12/25/21 01:30   105 H  32 H   87 L


 


 12/25/21 01:00   106 H  32 H   87 L


 


 12/25/21 00:30   107 H  32 H   88 L


 


 12/25/21 00:00  101.3 F H  109 H  32 H   88 L


 


 12/24/21 23:30   106 H  32 H   87 L


 


 12/24/21 23:00   107 H  32 H   86 L


 


 12/24/21 22:30   109 H  36 H   85 L


 


 12/24/21 22:00   154 H  36 H   85 L


 


 12/24/21 21:30   100  36 H   86 L


 


 12/24/21 21:00   97  36 H   89 L


 


 12/24/21 20:30   97  36 H   83 L


 


 12/24/21 20:00  99.2 F  99  36 H   83 L


 


 12/24/21 19:30   98  36 H   84 L


 


 12/24/21 19:00  99.1 F  96    83 L








                                Intake and Output











 12/25/21 12/25/21 12/25/21





 06:59 14:59 22:59


 


Intake Total 0221.737 1289.764 361.665


 


Output Total 1300 1250 600


 


Balance -35.545 -137.236 -238.335


 


Intake:   


 


   284 92


 


    Cefepime 2 gm In Sodium  100 





    Chloride 0.9% 100 ml @ 25   





    mls/hr IVPB Q12HR [facility]   





    #:[id number]   


 


    Normal Saline 0.9 24 24 12





    Pressure Bag @ 3mL/hr   


 


    Sodium Chloride 0.9% 1, 160 160 80





    000 ml @ 20 mls/hr IV .   





    Q24H [facility] Rx#:[id number]   


 


  Intake, IV Titration 918.455 666.764 171.665





  Amount   


 


    Cisatracurium 200 mg In 200  





    Sodium Chloride 0.9% 180   





    ml @ 1 MCG/KG/MIN 6.668   





    mls/hr IV .Q24H [facility] Rx#:   





    [id number]   


 


    Clevidipine Butyrate 25 82.8 69.333 





    mg In Empty Bag 1 bag @ 1   





    MG/HR 2 mls/hr IV .Q24H   





    [facility] Rx#:[id number]   


 


    Diltiazem 125 mg In 125 125 





    Sodium Chloride 0.9% 100   





    ml @ 5 MG/HR 5 mls/hr IV   





    .Q24H [facility] Rx#:[id number]   


 


    fentaNYL (PF). 1,000 mcg 296.683 298.923 93.905





    In Sodium Chloride 0.9%   





    80 ml @ 0.5 MCG/KG/HR 5.   





    557 mls/hr IV .Q18H [facility]   





    Rx#:[id number]   


 


    propofoL 1,000 mg In 213.972 173.508 77.76





    Empty Bag 1 bag @ Titrate   





    IV .Q0M [facility] Rx#:   





    [id number]   


 


  Tube Feeding 102 102 68


 


  Other 60 60 30


 


Output:   


 


  Urine 1300 1250 600


 


Other:   


 


  Voiding Method Indwelling Catheter Indwelling Catheter Indwelling Catheter








                         ABP, PAP, CO, CI - Last 8 Hours











Arterial Blood Pressure        137/60


 


Arterial Blood Pressure        150/62


 


Arterial Blood Pressure        146/61


 


Arterial Blood Pressure        146/61


 


Arterial Blood Pressure        150/63


 


Arterial Blood Pressure        147/62


 


Arterial Blood Pressure        145/62


 


Arterial Blood Pressure        140/59


 


Arterial Blood Pressure        139/60


 


Arterial Blood Pressure        133/59


 


Arterial Blood Pressure        130/59


 


Arterial Blood Pressure        140/63


 


Arterial Blood Pressure        146/64


 


Arterial Blood Pressure        147/65


 


Arterial Blood Pressure        143/64














GENERAL DESCRIPTION: Middle-aged male intubated on the vent no distress. No 

tachypnea or accessory muscle of respiration use.


HEENT: Shows Pallor , no scleral icterus. Oral mucous membrane is dry. No 

pharyngeal erythema or thrush


NECK: Trachea central, no thyromegaly.


LUNGS: Unlabored breathing.  Decreased intensity of breath sounds. No wheeze or 

crackle.


HEART: S1, S2, regular rate and rhythm. No loud murmur


ABDOMEN: Soft, no tenderness , guarding or rigidity, no organomegaly


EXTREMITIES: No edema of feet.


SKIN: No rash, no masses palpable.


NEUROLOGICAL: The patient is sedated on the vent.

















Results


CBC & Chem 7: 


                                 12/25/21 03:25





                                 12/25/21 03:25


Labs: 


                  Abnormal Lab Results - Last 24 Hours (Table)











  12/25/21 12/25/21 12/25/21 Range/Units





  00:09 01:17 03:25 


 


WBC     (3.8-10.6)  k/uL


 


RBC     (4.30-5.90)  m/uL


 


Hgb     (13.0-17.5)  gm/dL


 


Hct     (39.0-53.0)  %


 


MCHC     (31.0-37.0)  g/dL


 


Neutrophils #     (1.3-7.7)  k/uL


 


Lymphocytes #     (1.0-4.8)  k/uL


 


ABG pCO2   70 H   (35-45)  mmHg


 


ABG pO2   63 L   ()  mmHg


 


ABG HCO3   41 H*   (21-25)  mmol/L


 


ABG Total CO2   43 H   (19-24)  mmol/L


 


ABG O2 Saturation   92.8 L   (94-97)  %


 


Sodium    147 H  (137-145)  mmol/L


 


Carbon Dioxide    39 H  (22-30)  mmol/L


 


BUN    51 H  (9-20)  mg/dL


 


Glucose    125 H  (74-99)  mg/dL


 


POC Glucose (mg/dL)  149 H    (75-99)  mg/dL


 


AST    81 H  (17-59)  U/L


 


ALT    144 H  (4-49)  U/L


 


Alkaline Phosphatase    161 H  ()  U/L


 


C-Reactive Protein     (<1.0)  mg/dL


 


Total Protein    6.2 L  (6.3-8.2)  g/dL


 


Albumin    2.8 L  (3.5-5.0)  g/dL














  12/25/21 12/25/21 12/25/21 Range/Units





  03:25 06:14 12:16 


 


WBC  14.0 H    (3.8-10.6)  k/uL


 


RBC  3.82 L    (4.30-5.90)  m/uL


 


Hgb  11.3 L    (13.0-17.5)  gm/dL


 


Hct  37.7 L    (39.0-53.0)  %


 


MCHC  29.9 L    (31.0-37.0)  g/dL


 


Neutrophils #  12.1 H    (1.3-7.7)  k/uL


 


Lymphocytes #  0.8 L    (1.0-4.8)  k/uL


 


ABG pCO2     (35-45)  mmHg


 


ABG pO2     ()  mmHg


 


ABG HCO3     (21-25)  mmol/L


 


ABG Total CO2     (19-24)  mmol/L


 


ABG O2 Saturation     (94-97)  %


 


Sodium     (137-145)  mmol/L


 


Carbon Dioxide     (22-30)  mmol/L


 


BUN     (9-20)  mg/dL


 


Glucose     (74-99)  mg/dL


 


POC Glucose (mg/dL)   126 H  154 H  (75-99)  mg/dL


 


AST     (17-59)  U/L


 


ALT     (4-49)  U/L


 


Alkaline Phosphatase     ()  U/L


 


C-Reactive Protein     (<1.0)  mg/dL


 


Total Protein     (6.3-8.2)  g/dL


 


Albumin     (3.5-5.0)  g/dL














  12/25/21 12/25/21 Range/Units





  17:30 17:36 


 


WBC    (3.8-10.6)  k/uL


 


RBC    (4.30-5.90)  m/uL


 


Hgb    (13.0-17.5)  gm/dL


 


Hct    (39.0-53.0)  %


 


MCHC    (31.0-37.0)  g/dL


 


Neutrophils #    (1.3-7.7)  k/uL


 


Lymphocytes #    (1.0-4.8)  k/uL


 


ABG pCO2    (35-45)  mmHg


 


ABG pO2    ()  mmHg


 


ABG HCO3    (21-25)  mmol/L


 


ABG Total CO2    (19-24)  mmol/L


 


ABG O2 Saturation    (94-97)  %


 


Sodium    (137-145)  mmol/L


 


Carbon Dioxide    (22-30)  mmol/L


 


BUN    (9-20)  mg/dL


 


Glucose    (74-99)  mg/dL


 


POC Glucose (mg/dL)   159 H  (75-99)  mg/dL


 


AST    (17-59)  U/L


 


ALT    (4-49)  U/L


 


Alkaline Phosphatase    ()  U/L


 


C-Reactive Protein  3.8 H   (<1.0)  mg/dL


 


Total Protein    (6.3-8.2)  g/dL


 


Albumin    (3.5-5.0)  g/dL








                      Microbiology - Last 24 Hours (Table)











 12/24/21 15:32 Wound Culture - Preliminary





 Face 














Assessment and Plan


Assessment: 


1-patient with new fever recorded around midnight and this patient has been in 

the hospital for 16 days with initial diagnosis of covid 19 pneumonia, patient 

did have a worsening of his respiratory status and lipid indurated about 9 days 

ago, with the source of this new fever could be secondary bacterial pneumonia 

with a sputum showing MSSA or could be related the lines the patient has for the

 last 9 days





(1) Fever


Current Visit: Yes   Status: Acute   Code(s): R50.9 - FEVER, UNSPECIFIED   

SNOMED Code(s): 202594878


   


Plan: 


1-blood cultures will be obtained and will also obtain a urine culture CRP and 

pro calcitonin to complete the workup


2-continue with the cefepime


3-we'll add vancomycin pharmacy to dose while awaiting further workup to 

finalize


We will follow on clinical condition and cultures to further adjust medication 

if needed


Thank you for this consultation will follow this patient with you





Time with Patient: Greater than 30

## 2021-12-26 VITALS — DIASTOLIC BLOOD PRESSURE: 73 MMHG | SYSTOLIC BLOOD PRESSURE: 107 MMHG

## 2021-12-26 VITALS — TEMPERATURE: 100 F

## 2021-12-26 LAB
ALBUMIN SERPL-MCNC: 2.8 G/DL (ref 3.5–5)
ALP SERPL-CCNC: 133 U/L (ref 38–126)
ALT SERPL-CCNC: 111 U/L (ref 4–49)
ANION GAP SERPL CALC-SCNC: 4 MMOL/L
AST SERPL-CCNC: 51 U/L (ref 17–59)
BUN SERPL-SCNC: 53 MG/DL (ref 9–20)
CALCIUM SPEC-MCNC: 8.6 MG/DL (ref 8.4–10.2)
CHLORIDE SERPL-SCNC: 104 MMOL/L (ref 98–107)
CO2 BLDA-SCNC: 43 MMOL/L (ref 19–24)
CO2 SERPL-SCNC: 38 MMOL/L (ref 22–30)
GLUCOSE BLD-MCNC: 135 MG/DL (ref 75–99)
GLUCOSE BLD-MCNC: 156 MG/DL (ref 75–99)
GLUCOSE BLD-MCNC: 163 MG/DL (ref 75–99)
GLUCOSE SERPL-MCNC: 144 MG/DL (ref 74–99)
HCO3 BLDA-SCNC: 41 MMOL/L (ref 21–25)
PCO2 BLDA: 70 MMHG (ref 35–45)
PH BLDA: 7.38 [PH] (ref 7.35–7.45)
PH UR: 6 [PH] (ref 5–8)
PO2 BLDA: 54 MMHG (ref 83–108)
POTASSIUM SERPL-SCNC: 4.5 MMOL/L (ref 3.5–5.1)
PROT SERPL-MCNC: 6.1 G/DL (ref 6.3–8.2)
SODIUM SERPL-SCNC: 146 MMOL/L (ref 137–145)
SP GR UR: 1.02 (ref 1–1.03)
UROBILINOGEN UR QL STRIP: <2 MG/DL (ref ?–2)

## 2021-12-26 RX ADMIN — FUROSEMIDE SCH MG: 10 INJECTION, SOLUTION INTRAMUSCULAR; INTRAVENOUS at 08:50

## 2021-12-26 RX ADMIN — METOPROLOL TARTRATE SCH MG: 25 TABLET, FILM COATED ORAL at 08:49

## 2021-12-26 RX ADMIN — SODIUM CHLORIDE SCH MLS/HR: 900 INJECTION, SOLUTION INTRAVENOUS at 10:57

## 2021-12-26 RX ADMIN — METOCLOPRAMIDE SCH MG: 5 INJECTION, SOLUTION INTRAMUSCULAR; INTRAVENOUS at 05:42

## 2021-12-26 RX ADMIN — LACTULOSE SCH GM: 20 SOLUTION ORAL at 08:49

## 2021-12-26 RX ADMIN — CITALOPRAM HYDROBROMIDE SCH MG: 20 TABLET ORAL at 08:49

## 2021-12-26 RX ADMIN — GABAPENTIN SCH MG: 400 CAPSULE ORAL at 15:53

## 2021-12-26 RX ADMIN — INSULIN ASPART SCH UNIT: 100 INJECTION, SOLUTION INTRAVENOUS; SUBCUTANEOUS at 14:03

## 2021-12-26 RX ADMIN — CLEVIPIDINE SCH MLS/HR: 0.5 EMULSION INTRAVENOUS at 11:42

## 2021-12-26 RX ADMIN — ALBUTEROL SULFATE SCH PUFF: 90 AEROSOL, METERED RESPIRATORY (INHALATION) at 07:40

## 2021-12-26 RX ADMIN — CEFEPIME HYDROCHLORIDE SCH MLS/HR: 2 INJECTION, POWDER, FOR SOLUTION INTRAVENOUS at 15:54

## 2021-12-26 RX ADMIN — DEXTROSE SCH MG: 50 INJECTION, SOLUTION INTRAVENOUS at 08:51

## 2021-12-26 RX ADMIN — METOPROLOL TARTRATE SCH: 50 TABLET, FILM COATED ORAL at 20:28

## 2021-12-26 RX ADMIN — CLEVIPIDINE SCH MLS/HR: 0.5 EMULSION INTRAVENOUS at 18:02

## 2021-12-26 RX ADMIN — CLEVIPIDINE SCH MLS/HR: 0.5 EMULSION INTRAVENOUS at 16:39

## 2021-12-26 RX ADMIN — INSULIN ASPART SCH UNIT: 100 INJECTION, SOLUTION INTRAVENOUS; SUBCUTANEOUS at 17:07

## 2021-12-26 RX ADMIN — DILTIAZEM HYDROCHLORIDE SCH MLS/HR: 5 INJECTION INTRAVENOUS at 13:09

## 2021-12-26 RX ADMIN — Medication SCH MCG: at 08:49

## 2021-12-26 RX ADMIN — SODIUM CHLORIDE SCH MLS/HR: 900 INJECTION, SOLUTION INTRAVENOUS at 20:02

## 2021-12-26 RX ADMIN — CHLORHEXIDINE GLUCONATE SCH: 1.2 RINSE ORAL at 20:27

## 2021-12-26 RX ADMIN — CEFEPIME HYDROCHLORIDE SCH MLS/HR: 2 INJECTION, POWDER, FOR SOLUTION INTRAVENOUS at 08:48

## 2021-12-26 RX ADMIN — SODIUM CHLORIDE SCH MLS/HR: 900 INJECTION, SOLUTION INTRAVENOUS at 17:10

## 2021-12-26 RX ADMIN — CEFAZOLIN SCH: 330 INJECTION, POWDER, FOR SOLUTION INTRAMUSCULAR; INTRAVENOUS at 20:28

## 2021-12-26 RX ADMIN — FUROSEMIDE SCH: 10 INJECTION, SOLUTION INTRAMUSCULAR; INTRAVENOUS at 20:27

## 2021-12-26 RX ADMIN — DEXTRAN 70 AND HYPROMELLOSE 2910 SCH DROPS: 1; 3 SOLUTION/ DROPS OPHTHALMIC at 08:48

## 2021-12-26 RX ADMIN — GABAPENTIN SCH MG: 400 CAPSULE ORAL at 08:49

## 2021-12-26 RX ADMIN — SODIUM CHLORIDE SCH MLS/HR: 900 INJECTION, SOLUTION INTRAVENOUS at 05:17

## 2021-12-26 RX ADMIN — CLEVIPIDINE SCH MLS/HR: 0.5 EMULSION INTRAVENOUS at 04:47

## 2021-12-26 RX ADMIN — CISATRACURIUM BESYLATE SCH MLS/HR: 10 INJECTION INTRAVENOUS at 13:10

## 2021-12-26 RX ADMIN — SODIUM CHLORIDE SCH: 9 INJECTION, SOLUTION INTRAVENOUS at 20:28

## 2021-12-26 RX ADMIN — DEXTRAN 70 AND HYPROMELLOSE 2910 SCH DROPS: 1; 3 SOLUTION/ DROPS OPHTHALMIC at 15:54

## 2021-12-26 RX ADMIN — INSULIN ASPART SCH UNIT: 100 INJECTION, SOLUTION INTRAVENOUS; SUBCUTANEOUS at 05:39

## 2021-12-26 RX ADMIN — SODIUM CHLORIDE SCH MLS/HR: 900 INJECTION, SOLUTION INTRAVENOUS at 08:13

## 2021-12-26 RX ADMIN — Medication SCH MG: at 08:49

## 2021-12-26 RX ADMIN — CHLORHEXIDINE GLUCONATE SCH ML: 1.2 RINSE ORAL at 08:48

## 2021-12-26 RX ADMIN — SODIUM CHLORIDE SCH MLS/HR: 900 INJECTION, SOLUTION INTRAVENOUS at 02:40

## 2021-12-26 RX ADMIN — ENOXAPARIN SODIUM SCH MG: 80 INJECTION SUBCUTANEOUS at 08:51

## 2021-12-26 RX ADMIN — MORPHINE SULFATE PRN MG: 4 INJECTION, SOLUTION INTRAMUSCULAR; INTRAVENOUS at 23:53

## 2021-12-26 RX ADMIN — PANTOPRAZOLE SODIUM SCH MG: 40 INJECTION, POWDER, FOR SOLUTION INTRAVENOUS at 08:51

## 2021-12-26 RX ADMIN — OXYCODONE HYDROCHLORIDE AND ACETAMINOPHEN SCH MG: 500 TABLET ORAL at 08:49

## 2021-12-26 RX ADMIN — METOCLOPRAMIDE SCH MG: 5 INJECTION, SOLUTION INTRAMUSCULAR; INTRAVENOUS at 17:07

## 2021-12-26 RX ADMIN — DEXTRAN 70 AND HYPROMELLOSE 2910 SCH DROPS: 1; 3 SOLUTION/ DROPS OPHTHALMIC at 04:37

## 2021-12-26 RX ADMIN — SODIUM CHLORIDE SCH MLS/HR: 9 INJECTION, SOLUTION INTRAVENOUS at 10:57

## 2021-12-26 RX ADMIN — DEXTRAN 70 AND HYPROMELLOSE 2910 SCH DROPS: 1; 3 SOLUTION/ DROPS OPHTHALMIC at 14:03

## 2021-12-26 RX ADMIN — SODIUM CHLORIDE SCH MLS/HR: 900 INJECTION, SOLUTION INTRAVENOUS at 14:07

## 2021-12-26 RX ADMIN — ALBUTEROL SULFATE SCH PUFF: 90 AEROSOL, METERED RESPIRATORY (INHALATION) at 19:05

## 2021-12-26 RX ADMIN — GABAPENTIN SCH: 400 CAPSULE ORAL at 20:28

## 2021-12-26 RX ADMIN — ENOXAPARIN SODIUM SCH: 80 INJECTION SUBCUTANEOUS at 20:27

## 2021-12-26 RX ADMIN — LACTULOSE SCH: 20 SOLUTION ORAL at 20:28

## 2021-12-26 RX ADMIN — METOPROLOL TARTRATE SCH MG: 50 TABLET, FILM COATED ORAL at 15:54

## 2021-12-26 RX ADMIN — CEFEPIME HYDROCHLORIDE SCH MLS/HR: 2 INJECTION, POWDER, FOR SOLUTION INTRAVENOUS at 01:27

## 2021-12-26 RX ADMIN — METOCLOPRAMIDE SCH MG: 5 INJECTION, SOLUTION INTRAMUSCULAR; INTRAVENOUS at 14:18

## 2021-12-26 RX ADMIN — DEXTROSE SCH: 50 INJECTION, SOLUTION INTRAVENOUS at 20:27

## 2021-12-26 RX ADMIN — ALBUTEROL SULFATE SCH PUFF: 90 AEROSOL, METERED RESPIRATORY (INHALATION) at 11:39

## 2021-12-26 RX ADMIN — LEVOTHYROXINE SODIUM SCH MCG: 50 TABLET ORAL at 05:54

## 2021-12-26 NOTE — P.PN
Subjective


Progress Note Date: 12/26/21





History of present illness


56-year-old  male with past medical history of fibromyalgia comes in to

emergency room with symptoms of nausea, vomiting, dizziness and sweating feeling

weak in with runny nose loss of taste and hence now for the past 1 week.  He 

presented on 12/7 was found to have positive COVID results.  Received his 

monoclonal antibodies.  He Came back to the emergency room because of worsening 

of symptoms.Vitals reviewed patient afebrile pulse 80 respiratory rate 20 blood 

pressure 138/82 saturating at 88% on 10 L labs reviewed WBC 6.6 hemoglobin 13.9 

d-dimer is elevated at 0.89, sodium 135 bicarb 21 BUN 16 creatinine 1.3 glucose 

187 ferritin 2822 AST 89 ALT 80 alkaline phosphatase 225 LDH 1091 and CRP 21 and

albumin 3.3


X-ray suggestive of bilateral patchy pneumonia.  Both interstitial and airspace 

infiltrate


Pulmonary clinic consulted for COVID pneumonia


Patient placed on Lovenox 40 subcu twice a day, vitamin C, D and zinc.  

Dexamethasone initiated at 6 mg twice a day IV


Patient is a candidate of Baritinib and will discuss about initiating it with 

pulmonary


Venous Doppler ordered





12/10: Patient is seen on the [facility] floor in follow-up.  He continues to have 

dyspnea and is on 15 L high flow nasal cannula and nonrebreather with pulse ox 

of 87 and 95%.  He's been afebrile, heart rate in the 60s and 70s, blood 

pressure 127/73.  Pro-calcitonin came back high at 9.75 and we started the 

patient on Zosyn and vancomycin for bacterial pneumonia coverage.  Patient has 

coarse crackles bilaterally.  No lower extremity edema.  He has been seen and 

followed by pulmonary medicine.  Patient is not a candidate for Baricitinib.  

Patient is continued on Decadron, Lovenox and vitamin supplements.





12/11: Is found sitting up in the edge of the bed.  He is currently not on a 

Ventimask.  However his pulse oxing still around 88 to 91 % on Ventimask or 15 L

high flow nasal cannula. Patient continues to have dyspnea with activity and 

speaking. Patient states that he is feeling much better compared to yesterday.  

Continues to have a cough.  No lower extremity edema.  He has rhonchi to the 

bases bilaterally.  He is currently on Decadron Lovenox and vitamin supplements.

 Patient remains afebrile, heart rate 62, respirations 17, blood pressure 118/68

pulse ox 93% on 15 L high flow nasal cannula





12/12: Patient was up to the bathroom showering today.  Awaiting inflammation 

markers today.  Patient still requires 10 L of O2 with a pulse ox of 90%.  

Patient continues to have crackles to the bilateral bases.  Dyspnea with 

activity and speaking.  Patient states he is feeling better however he is very 

tired.  Continues to have cough no lower extremity edema.  He still currently on

Decadron, Lovenox, and vitamin supplements.  Patient remains afebrile, heart 

rate 58, respirations 17, blood pressure 118/89,





12/13: Patient is still on high flow nasal cannula 15 L and nonrebreather with 

pulse ox of 89 and 99%.  He's been afebrile, heart rate 73, blood pressure 

120/72.  Creatinine 1.06.  Blood sugars running between 101 and 106.  Sputum 

culture is in progress.  Patient is followed closely by pulmonary medicine.  

Patient is continued on dexamethasone, Lovenox, Zosyn and vitamin supplements.  

Patient has been encouraged to prone several hours daily.





12/14: Patient remains on nonrebreather mask and AirVo at 60 L, FiO2 of 85 with 

pulse ox of 88%.  Appears to have more difficulty with breathing.  Repeat chest 

x-ray reveals stable bilateral infiltrates.  He has been afebrile, heart rate 

59, blood pressure 102/59.  Repeat d-dimer 1.53.  Blood sugars are well 

controlled.





12/15: Patient remains on nonrebreather and AirVo with pulse ox at 90%.  Patient

was agreeable.  He has been afebrile, heart rate 70, blood pressure 131/79.  CBG

running between 97 and 116 and CBGs and insulin will be discontinued.  Sputum 

cultures positive for Candida albicans.  Patient is increasing his activity, 

currently laying in bed on his side.  Lab work including inflammatory markers 

ordered for tomorrow.





12/16: Patient had a drop in his pulse ox and 78% with nonrebreather and AirVo 

and A-Team was called.  Patient stated that his shortness of breath was better 

than the night before.  Stat chest x-ray was done which revealed bilateral 

multifocal peripheral increased opacities consistent with Covid 19 infection are

redemonstrated.  No significant change.  Patient transitioned to BiPAP with 

pulse ox of 90%.


Patient verbalized that he wanted to be a no CODE STATUS and was encouraged to 

discuss with family.  It was determined the patient will be transferred to the 

intensive care unit most likely be intubated.  Patient was very resistant but 

after long discussion with Dr. Austin wheeler, patient agreed to intubation and

full CODE STATUS at this point.  Repeat blood work reveals WBC 9.4, hemoglobin 

12.7, platelet count 263.  D-dimer 1.31.  Blood sugars





12/17: Patient was transferred into the intensive care unit yesterday and 

subsequently intubated and placed on mechanical ventilation, tidal volume 450, 

FiO2 100, PEEP of 20.  Patient was prone this morning switched over to supine.  

He was started on tube feedings yesterday but had a high residual this morning 

after pronating.  He is currently on fentanyl, propofol, Nimbex drip.  One amp 

of bicarbonate given this morning.


Chest x-ray reveals lateral multifocal confluent and increased opacities 

greatest in the periphery consistent with Covid 19 infection redemonstrated.  No

sicca be unchanged from one day earlier.  


CTA of the chest done yesterday reveals suboptimal study without saddle central 

pulmonary embolism.  Cannot exclude smaller pulmonary emboli.  Bilateral 

multifocal and confluent groundglass opacities and organizing consolidations 

consistent with known Covid 19 infection.


Repeat blood work reveals WBC 13.1, hemoglobin 13.1, platelet count 381.  D-

dimer 1.17.  Sodium 135, potassium 5.2, chloride 104, CO2 24, BUN 29 and 

creatinine 0.78.  Capillary blood glucose running between 116 and 150s.  AST 

216.  C-reactive protein 7.9, pro-calcitonin 0.15.





12/18: This is day #9 of admission, patient remains in ICU, still mechanically 

ventilated for hypoxemia, starting 12/17/2021, on [place] assist control, rate 32,

PEEP of 20, FiO2 100%, dexamethasone 6 mg daily, electrolytes are stable, BUN 29

creatinine 0.84, x-ray shows bilateral multifocal opacities, consistent with 

Covid pneumonia, CT angiogram negative for PE, patient is on propofol, fentanyl 

and index vital HP, at 20 mL an hour 





12/19: Patient remains in ICU, intubated, NG tube in place, chest x-ray shows no

pneumothorax, no pleural effusion, there is bilateral patchy pulmonary 

interstitial infiltrates, with coalescent density in the left lower lobe.  No 

significant change from yesterdayALT iscreatinine is 0.75, ALT is trending 

downward, C reactive protein is 8.0 from a previous of 8.9Lasix given today, on 

dexamethasone, and  remains sedated.





12/20: Patient remains in the intensive care unit, intubated and on mechanical 

ventilation with FiO2 60% and PEEP of 20 with pulse ox of 87%.  Patient is being

prone for 16 hours per day.  He is tolerating tube feedings.  He has been 

started on lactulose this morning for constipation.  He has been afebrile, heart

rate 75, respiratory rate in the low 30s, blood pressure 148/65.  Patient was 

started on Clevidipine gtt. he is also on propofol, fentanyl and Nimbex.  No new

concerns, patient seems to be fairly stable.





12/21: Patient remains in intensive care unit intubated and on mechanical 

ventilation with FiO2 of 65, tidal volume 450, PEEP of 20.  Patient is being 

managed by the intensivist.  Patient has not been prone today due to skin 

breakdown on his face.  He is on tube feedings and tolerating.  Patient has been

afebrile, heart rate 102, blood pressure 151/71, pulse ox 85-89%.  Repeat blood 

work reveals WBC 13.5, hemoglobin 12.2.  Electrolytes normal.  BUN 47 creatinine

1.05.  .  C-reactive protein 19.8.  D-dimer 1.09.  The patient did not 

have a bowel movement after lactulose yesterday which is been repeated and 

Dulcolax suppository ordered for today.  





12/22, patient remains in ICU, intubated and mechanically vented, is starting to

have skin excoriations, in the face and with deep tissue injury chin area, from 

pronating position, for that reason, they have avoided pronating since 

yesterday.  Patient's currently sedated, and is receiving fentanyl, and nimbex 

clevipres. ngt tolerated at 50 cch/r vital hp, inflammatory markers are still 

elevated, with LDH of 58, CRP of 19, still with bowel issues, they have 

increased lactulose to 20 mg twice a day, and Dulcolax suppository daily.  No 

plans for  weaning off the vent trials at this time, probably might end up in a 

trach PEG .





12/23 patient went into A. fib RVR last night, for which they have started him 

on amiodarone, which did not help, patient therefore was started on Cardizem 

drip, currently at 15 mics, still no bowel movement, despite Dulcolax and 

lactulose, we'll going to do half dose MiraLAX a day, and may repeat in 12 

hours, patient is still no vent, with PEEP of 21





12/24 patient in ICU, still mechanical ventilation, closely followed by 

pulmonary intensivist not much improvement, over the past 48 hours, BP still 

high at 21, still requires sedation, x-rays continue to show multifocal 

infiltrates, patient remains on his IV antibiotics cefepime, empiric treatments,

Decadron 6 mg daily, and Lovenox shot.  Lasix 40 mg every 12 hours patient still

has low-grade temp, between 99.6 T-max still tachypneic.  Pulse ox 85% FiO2 70% 

on vent








12/25 Patient remains in ICU still mechanically ventilated on current settings 

of respiratory rate 32, tidal volume 450 on FiO2 100% with PEEP of 20.  Patient 

has low-grade temp this morning 100.8 and a high of 101.3 last night, pulse of 

87 respiratory rate 32 blood pressure 130/76 oxygen saturation 8700% FiO2.  

White cell count this morning is a W BC O 14 hemoglobin 1311.3 ABG drawn at 100%

FiO2 soled suggest a pH of 7.38E CO2 of 74-63 bicarb 41 sodium 147 potassium 4.5

bicarb 39 BUN 51 creatinine 1. 8 glucose stable at 125 liver enzymes gradually 

getting evaluated with AST 81  alkaline phosphatase of 161 sputum 

cultures suggestive of staph aureus and Candida albicans stop chest x-ray this 

morning suggests a cathetered bilateral airspace and interstitial opacities 

likely worsened in the lower lungs  no pneumothorax or large effusion.  Patient 

was noted to be in intermittent atrial fibrillation with RVR as nature.  

Cardizem currently increased to 15 mics per KG.  Patient currently in sinus r

hythm.  Cardiology consulted initiated patient on metoprolol 25 3 times a day.  

Echocardiogram ordered.  Patient remains on multiple drips including fentanyl, 

Pneumovax, propofol, TB Prax, Cardizem.  Patient is a poor prognosis at this 

point.  On pulmonary with try to titrate down the FiO2 to keep above 90%.  

Wishes continued to remain on antibiotics with cefepime.  We will consult 

infectious disease.  Continue Lasix at 40 IV every 12 hours.  Inflammation 

markers repeat including LDH, ferritin, proBNP and CRP.  








12/26 patient remains in dance still mechanically ventilated on the current sett

ings of assist control respiratory rate of 32 tidal 01/04/1951 FiO2 100 was in 

PEEP of 20.  He remains sedated and paralyzed on Nimbex, propofol and fentanyl 

drip.  Cardizem drip is running at 10 mics per hour.  Cardiology seen the 

patient for atrial fibrillation and has increased vision metoprolol to 50 twice 

a day with plan to titrate taper down the Cardizem.  32 blood pressure 180/98 

85% 100% FiO2 pH of 7.38 pCO2 100 of sodium and 46 potassium 4.5 BUN 3853 

creatinine 0.96.  Liver enzymes are mildly elevated.  Ferritin 625 including 

0.15 CRP 3.8.  Continue on Lovenox 80 twice a day Lasix 40 IV twice a day.  

Cefepime 2 g every 8 hours.  Vancomycin added by infectious disease.  Patient 

has responded poorly to the above care and is a poor prognosis since oxygen 

saturations were 80-83% on 100% FiO2.  Patient is currently seen in prone 

positioning making good urine output.  We'll update family today


ROS


Unable to obtain due to intubation





Physical exam


General Appearance: Patient is laying in the ICU bed, intubated and on 

mechanical ventilation.  No acute distress.  Patient appears to be comfortable. 


Full examination deferred to intensive this due to intubation and Covid 19.





Assessment and plan


1. Acute hypoxic respiratory failure secondary to Covid 19 pneumonia.  Consult 

with pulmonary medicine appreciated.  Patient was intubated and placed on 

mechanical ventilation 12/16.  Continue care in the intensive care unit, 

proning, currently on fentanyl, propofol and Nimbex. patient on cefepime 2 g 

every 12 hours .  Vancomycin added by infectious disease





2. Acute COVID pneumonia. Dexamethasone 6 mg IV increased to twice daily,  

Lovenox 80 subcu dailytwice daily, Not a candidate for Baricitinib, Monitor 

inflammatory markers. Continue supplements vitamin C, D and zinc to implementing

markers ordered possible bacterial in position since staph aureus growing on the

cultures.  Katey albicans noted on the culture .  Vancomycin initiated





3. Acute transaminitis Secondary to viral inflammation. Continue monitoring





4. CKD stage 3.  Continue to monitor patient's creatinine.  No acute kidney 

injury





5. History of fibromyalgia. continue gabapentin 800 3 times a day, Citalopram 40

mg by mouth daily





6. Insomnia. Was on Eszopiclone 3 mg daily at bedtime 





7. Hypothyroidism. Levothyroxine 50 g by mouth daily





8.  Hypertension.  Patient started on  Clevidipine gtt. 





9.  DVT prophylaxis. Lovenox 40 subcu daily.





10. GI prophylaxis.  Protonix 40 mg IV push daily





11.  Hyperglycemia secondary to steroids, IV drips, patient is on NovoLog scale.





12.  Constipation.  Lactulose 20 g daily and Dulcolax suppository today MiraLAX.





13 atrial fibrillation with RVR on Cardizem 5 g by mouth daily with metoprolol 

50 3 times a day 


Prognosis guarded.





Status: Full code





Discharge Plan: 


To be determined.





Objective





- Vital Signs


Vital signs: 


                                   Vital Signs











Temp  99.6 F   12/26/21 12:00


 


Pulse  88   12/26/21 14:00


 


Resp  363 H  12/26/21 14:00


 


BP  107/73   12/26/21 12:30


 


Pulse Ox  84 L  12/26/21 14:00








                                 Intake & Output











 12/25/21 12/26/21 12/26/21





 18:59 06:59 18:59


 


Intake Total 5905.269 8142.614 1863.294


 


Output Total 1850 1345 1270


 


Balance -285.273 716.614 593.294


 


Intake:   


 


   876 738


 


    Cefepime 2 gm In Sodium 100 100 100





    Chloride 0.9% 100 ml @ 25   





    mls/hr IVPB Q12HR [facility] Rx   





    #:[id number]   


 


    Normal Saline 0.9 36 36 18





    Pressure Bag @ 3mL/hr   


 


    Sodium Chloride 0.9% 1, 240 240 120





    000 ml @ 20 mls/hr IV .   





    Q24H [facility] Rx#:[id number]   


 


    Vancomycin 2,000 mg In   500





    Sodium Chloride 0.9% 500   





    ml 500 ml @ 167 mls/hr   





    IVPB Q12H [facility] Rx#:   





    [id number]   


 


    Vancomycin 2,500 mg In  500 





    Sodium Chloride 0.9% 500   





    ml 500 ml @ 167 mls/hr   





    IVPB ONCE ONE Rx#:   





    [id number]   


 


  Intake, IV Titration 928.727 951.614 963.294





  Amount   


 


    Cisatracurium 200 mg In  163.366 199.373





    Sodium Chloride 0.9% 180   





    ml @ 1 MCG/KG/MIN 6.668   





    mls/hr IV .Q24H [facility] Rx#:   





    [id number]   


 


    Clevidipine Butyrate 25 69.333 62.600 50.000





    mg In Empty Bag 1 bag @ 1   





    MG/HR 2 mls/hr IV .Q24H   





    [facility] Rx#:[id number]   


 


    Diltiazem 125 mg In 195.25 49.667 125





    Sodium Chloride 0.9% 100   





    ml @ 5 MG/HR 5 mls/hr IV   





    .Q24H [facility] Rx#:[id number]   


 


    fentaNYL (PF). 1,000 mcg 392.828 380.603 288.921





    In Sodium Chloride 0.9%   





    80 ml @ 0.5 MCG/KG/HR 5.   





    557 mls/hr IV .Q18H [facility]   





    Rx#:[id number]   


 


    propofoL 1,000 mg In 271.316 295.378 300





    Empty Bag 1 bag @ Titrate   





    IV .Q0M [facility] Rx#:   





    [id number]   


 


  Tube Feeding 170 204 102


 


  Other 90 30 60


 


Output:   


 


  Urine 1850 1345 1270


 


Other:   


 


  Voiding Method Indwelling Catheter Indwelling Catheter Indwelling Catheter


 


  # Bowel Movements   1








                       ABP, PAP, CO, CI - Last Documented











Arterial Blood Pressure        141/61

















- Labs


CBC & Chem 7: 


                                 12/25/21 03:25





                                 12/26/21 05:55


Labs: 


                  Abnormal Lab Results - Last 24 Hours (Table)











  12/25/21 12/25/21 12/25/21 Range/Units





  17:30 17:30 17:36 


 


ABG pCO2     (35-45)  mmHg


 


ABG pO2     ()  mmHg


 


ABG HCO3     (21-25)  mmol/L


 


ABG Total CO2     (19-24)  mmol/L


 


ABG O2 Saturation     (94-97)  %


 


Sodium     (137-145)  mmol/L


 


Carbon Dioxide     (22-30)  mmol/L


 


BUN     (9-20)  mg/dL


 


Glucose     (74-99)  mg/dL


 


POC Glucose (mg/dL)    159 H  (75-99)  mg/dL


 


Ferritin  625.0 H    (22.0-322.0)  ng/mL


 


ALT     (4-49)  U/L


 


Alkaline Phosphatase     ()  U/L


 


C-Reactive Protein  3.8 H    (<1.0)  mg/dL


 


Total Protein     (6.3-8.2)  g/dL


 


Albumin     (3.5-5.0)  g/dL


 


Procalcitonin   0.15 H   (0.02-0.09)  ng/mL


 


Urine Protein     (Negative)  














  12/25/21 12/26/21 12/26/21 Range/Units





  23:28 05:34 05:55 


 


ABG pCO2     (35-45)  mmHg


 


ABG pO2     ()  mmHg


 


ABG HCO3     (21-25)  mmol/L


 


ABG Total CO2     (19-24)  mmol/L


 


ABG O2 Saturation     (94-97)  %


 


Sodium    146 H  (137-145)  mmol/L


 


Carbon Dioxide    38 H  (22-30)  mmol/L


 


BUN    53 H  (9-20)  mg/dL


 


Glucose    144 H  (74-99)  mg/dL


 


POC Glucose (mg/dL)  129 H  135 H   (75-99)  mg/dL


 


Ferritin     (22.0-322.0)  ng/mL


 


ALT    111 H  (4-49)  U/L


 


Alkaline Phosphatase    133 H  ()  U/L


 


C-Reactive Protein    3.3 H  (<1.0)  mg/dL


 


Total Protein    6.1 L  (6.3-8.2)  g/dL


 


Albumin    2.8 L  (3.5-5.0)  g/dL


 


Procalcitonin     (0.02-0.09)  ng/mL


 


Urine Protein     (Negative)  














  12/26/21 12/26/21 12/26/21 Range/Units





  05:58 09:06 11:50 


 


ABG pCO2  70 H    (35-45)  mmHg


 


ABG pO2  54 L*    ()  mmHg


 


ABG HCO3  41 H*    (21-25)  mmol/L


 


ABG Total CO2  43 H    (19-24)  mmol/L


 


ABG O2 Saturation  88.1 L    (94-97)  %


 


Sodium     (137-145)  mmol/L


 


Carbon Dioxide     (22-30)  mmol/L


 


BUN     (9-20)  mg/dL


 


Glucose     (74-99)  mg/dL


 


POC Glucose (mg/dL)    163 H  (75-99)  mg/dL


 


Ferritin     (22.0-322.0)  ng/mL


 


ALT     (4-49)  U/L


 


Alkaline Phosphatase     ()  U/L


 


C-Reactive Protein     (<1.0)  mg/dL


 


Total Protein     (6.3-8.2)  g/dL


 


Albumin     (3.5-5.0)  g/dL


 


Procalcitonin     (0.02-0.09)  ng/mL


 


Urine Protein   Trace H   (Negative)

## 2021-12-26 NOTE — P.PN
Subjective


Progress Note Date: 12/26/21


This is a 56-year-old gentleman who is been in the hospital for many days for 

treatment of: Pneumonia.  His a mechanically ventilated.  Patient doesn't show 

much improvement in his status of pneumonia.  Patient was an 85 with RVR.  

Patient was initiated on IV Cardizem.  He is also on by mouth metoprolol 25 mg 

by mouth 3 times a day.  His blood pressure is also running high.  I'm going to 

increase the dose of the metoprolol 50 mg by mouth twice a day his heart rate is

in the 90s.  However her respiratory status hasn't shown much improvement.  

Prognosis is poor








Objective





- Vital Signs


Vital signs: 


                                   Vital Signs











Temp  101.3 F H  12/26/21 04:00


 


Pulse  92   12/26/21 08:00


 


Resp  32 H  12/26/21 08:00


 


BP  129/73   12/26/21 08:00


 


Pulse Ox  86 L  12/26/21 08:00








                                 Intake & Output











 12/25/21 12/26/21 12/26/21





 18:59 06:59 18:59


 


Intake Total 2520.672 7772.614 177.794


 


Output Total 1850 1345 170


 


Balance -285.273 716.614 7.794


 


Intake:   


 


   876 46


 


    Cefepime 2 gm In Sodium 100 100 





    Chloride 0.9% 100 ml @ 25   





    mls/hr IVPB Q12HR [facility]   





    #:[id number]   


 


    Normal Saline 0.9 36 36 6





    Pressure Bag @ 3mL/hr   


 


    Sodium Chloride 0.9% 1, 240 240 40





    000 ml @ 20 mls/hr IV .   





    Q24H [facility] Rx#:[id number]   


 


    Vancomycin 2,500 mg In  500 





    Sodium Chloride 0.9% 500   





    ml 500 ml @ 167 mls/hr   





    IVPB ONCE ONE Rx#:   





    [id number]   


 


  Intake, IV Titration 928.727 951.614 97.794





  Amount   


 


    Cisatracurium 200 mg In  163.366 





    Sodium Chloride 0.9% 180   





    ml @ 1 MCG/KG/MIN 6.668   





    mls/hr IV .Q24H [facility] Rx#:   





    [id number]   


 


    Clevidipine Butyrate 25 69.333 62.600 





    mg In Empty Bag 1 bag @ 1   





    MG/HR 2 mls/hr IV .Q24H   





    [facility] Rx#:[id number]   


 


    Diltiazem 125 mg In 195.25 49.667 





    Sodium Chloride 0.9% 100   





    ml @ 5 MG/HR 5 mls/hr IV   





    .Q24H [facility] Rx#:[id number]   


 


    fentaNYL (PF). 1,000 mcg 392.828 380.603 97.794





    In Sodium Chloride 0.9%   





    80 ml @ 0.5 MCG/KG/HR 5.   





    557 mls/hr IV .Q18H [facility]   





    Rx#:[id number]   


 


    propofoL 1,000 mg In 271.316 295.378 





    Empty Bag 1 bag @ Titrate   





    IV .Q0M [facility] Rx#:   





    [id number]   


 


  Tube Feeding 170 204 34


 


  Other 90 30 


 


Output:   


 


  Urine 1850 1345 170


 


Other:   


 


  Voiding Method Indwelling Catheter Indwelling Catheter 








                       ABP, PAP, CO, CI - Last Documented











Arterial Blood Pressure        185/70

















- Exam





This patient is not personally examined, information is gathered from the chart





- Labs


CBC & Chem 7: 


                                 12/25/21 03:25





                                 12/26/21 05:55


Labs: 


                  Abnormal Lab Results - Last 24 Hours (Table)











  12/25/21 12/25/21 12/25/21 Range/Units





  12:16 17:30 17:30 


 


ABG pCO2     (35-45)  mmHg


 


ABG pO2     ()  mmHg


 


ABG HCO3     (21-25)  mmol/L


 


ABG Total CO2     (19-24)  mmol/L


 


ABG O2 Saturation     (94-97)  %


 


Sodium     (137-145)  mmol/L


 


Carbon Dioxide     (22-30)  mmol/L


 


BUN     (9-20)  mg/dL


 


Glucose     (74-99)  mg/dL


 


POC Glucose (mg/dL)  154 H    (75-99)  mg/dL


 


Ferritin   625.0 H   (22.0-322.0)  ng/mL


 


ALT     (4-49)  U/L


 


Alkaline Phosphatase     ()  U/L


 


C-Reactive Protein   3.8 H   (<1.0)  mg/dL


 


Total Protein     (6.3-8.2)  g/dL


 


Albumin     (3.5-5.0)  g/dL


 


Procalcitonin    0.15 H  (0.02-0.09)  ng/mL














  12/25/21 12/25/21 12/26/21 Range/Units





  17:36 23:28 05:34 


 


ABG pCO2     (35-45)  mmHg


 


ABG pO2     ()  mmHg


 


ABG HCO3     (21-25)  mmol/L


 


ABG Total CO2     (19-24)  mmol/L


 


ABG O2 Saturation     (94-97)  %


 


Sodium     (137-145)  mmol/L


 


Carbon Dioxide     (22-30)  mmol/L


 


BUN     (9-20)  mg/dL


 


Glucose     (74-99)  mg/dL


 


POC Glucose (mg/dL)  159 H  129 H  135 H  (75-99)  mg/dL


 


Ferritin     (22.0-322.0)  ng/mL


 


ALT     (4-49)  U/L


 


Alkaline Phosphatase     ()  U/L


 


C-Reactive Protein     (<1.0)  mg/dL


 


Total Protein     (6.3-8.2)  g/dL


 


Albumin     (3.5-5.0)  g/dL


 


Procalcitonin     (0.02-0.09)  ng/mL














  12/26/21 12/26/21 Range/Units





  05:55 05:58 


 


ABG pCO2   70 H  (35-45)  mmHg


 


ABG pO2   54 L*  ()  mmHg


 


ABG HCO3   41 H*  (21-25)  mmol/L


 


ABG Total CO2   43 H  (19-24)  mmol/L


 


ABG O2 Saturation   88.1 L  (94-97)  %


 


Sodium  146 H   (137-145)  mmol/L


 


Carbon Dioxide  38 H   (22-30)  mmol/L


 


BUN  53 H   (9-20)  mg/dL


 


Glucose  144 H   (74-99)  mg/dL


 


POC Glucose (mg/dL)    (75-99)  mg/dL


 


Ferritin    (22.0-322.0)  ng/mL


 


ALT  111 H   (4-49)  U/L


 


Alkaline Phosphatase  133 H   ()  U/L


 


C-Reactive Protein  3.3 H   (<1.0)  mg/dL


 


Total Protein  6.1 L   (6.3-8.2)  g/dL


 


Albumin  2.8 L   (3.5-5.0)  g/dL


 


Procalcitonin    (0.02-0.09)  ng/mL














Assessment and Plan


(1) Paroxysmal atrial fibrillation


Current Visit: Yes   Status: Acute   Code(s): I48.0 - PAROXYSMAL ATRIAL 

FIBRILLATION   SNOMED Code(s): 232637142


   





(2) COVID-19


Current Visit: Yes   Status: Acute   Code(s): U07.1 - COVID-19   SNOMED Code(s):

204432150


   





(3) Hypoxia


Current Visit: Yes   Status: Acute   Code(s): R09.02 - HYPOXEMIA   SNOMED 

Code(s): 488264904


   


Plan: 


Increase the dose of the metoprolol to 50 mg 3 times a day.  Continue rest of 

the management.  May be able to taper off Cardizem if heart rate and blood 

pressure control.  Prognosis is poor

## 2021-12-26 NOTE — PN
PROGRESS NOTE



DATE OF SERVICE:

12/26/2021



REASON FOR FOLLOWUP:

Fever.



INTERVAL HISTORY:

Patient did spike a fever of 101.1 around 4 in the morning with a low-grade fever since

then.  The patient is hemodynamically stable, not on any pressor support. The patient's

FiO2 is currently up to 100%.  However, no significant purulent secretions thru the ET,

diarrhea or any other changes reported by nursing staff.



PHYSICAL EXAMINATION:

Blood pressure 135/61, pulse of 80, temperature of 100, T-max is 101. He is 99% on 100%

FIO2. General  description is a middle-aged male intubated on the vent. Respiratory

system: Unlabored breathing, coarse breath sounds bilaterally. No wheeze. Heart S1, S2.

Regular rate and rhythm.  Abdomen soft, no tenderness.  Extremities: No edema of the

feet.



LABS:

Hemoglobin is 11.8, white count 14,000, creatinine 0.96.  Procalcitonin 0.15.



DIAGNOSTIC IMPRESSION AND PLAN:

Patient with fever with worsening of his respiratory status in this patient who does

have a severe Covid 19 pneumonia and likely ARDS could be related to it. Hence  the

patient's _____ were not significantly high.  Sputum is showing MSSA and patient is

covered with cefepime and vancomycin.  Overall poor prognosis.  Continue current

antibiotics and monitor clinical course closely.





MMODL / IJN: [id number] / Job#: [id number]

## 2021-12-26 NOTE — XR
EXAMINATION TYPE: XR chest 1V portable

 

DATE OF EXAM: 12/26/2021

 

COMPARISON: December 25, 2021

 

HISTORY: 56 years Male.

STUDY INDICATION GIVEN: SOB . 

 

TECHNIQUE: AP chest radiograph

 

IMPRESSION: 

 

Tip of endotracheal tube midthoracic trachea. Left central venous catheter tip at the cavoatrial junc
tion. Tube courses into the stomach.

 

No significant change to bilateral airspace and interstitial opacities.

 

No pneumothorax, effusion or cardiomegaly.

## 2021-12-26 NOTE — P.PN
Subjective


Progress Note Date: 12/26/21


Principal diagnosis: 





Acute hypoxic history failure secondary to COVID-19 pneumonia





On 12/22/2021 patient seen in follow-up in the intensive care unit, he remains 

intubated, sedated and paralyzed on assist-control mode of ventilation with a 

rate of 35, tidal M is 450, FiO2 65% and PEEP of 20, his peak air pressure is 

35, plateau pressure is 40.  This morning's blood gas shows pO2 of 61, pCO2 of 

66, and pH of 7.36, this was done on the above-mentioned vent settings.  Today's

chest x-ray has been reviewed showing stable diffuse bilateral infiltrates, and 

interval development of subcutaneous emphysema involving the soft tissues of the

neck, there is small amount of pneumomediastinum not excluded.  She is currently

on Diprivan at 50 mics per kilo per minute, fentanyl at 2.5 mics per kilo per 

minute, Nimbex is at 3 mics per kilo per minute, Cleviprex of the 5 mg per hour.

 He is tolerating tube feedings with vital HP at a rate of 15 with standard 

water flushes.  No fever or chills overnight, he is not requiring any 

vasopressor support, he is actually been a bit hypertensive requiring Cleviprex 

infusion, currently his blood pressure is better controlled and is 153/68 with a

mean of 92.  Patient has not been prone for last 48 hours related to development

of deep tissue injury on his face.  Today's labs show white blood cell count of 

14.9, hemoglobin of 12.8, platelet count is 449, sodium is 140, potassium is 

5.0, chloride is 102, CO2 36, B1 is 50, creatinine is 1.01, his AST is 41, ALT 

is 119, alk phos is 129.  His last set of inflammatory markers from yesterday 

showed LDH of 564, and CRP of 19.8, today's set of inflammatory markers is still

pending.  Patient has been receiving lactulose and Reglan, he has not had a 

bowel movement in several days, and we will increase his lactulose, she also 

remains on Lasix 40 mg twice daily however she is still does daily in negative 

fluid balance over the last 24 hours of -223 mL. 





Very today on 12/23/21, patient remains in the ICU, intubated and mechanically 

ventilated.  Patient is on assist control rate of 32 tidal volume 450 FiO2 70% 

PEEP is at 20.  ABG is marginal although the patient is on relatively high FiO2 

and high PEEP.  Patient has a pO2 of 57 pCO2 of 70 pH of 7.36.  His electrolytes

are normal renal profile showed a BUN of 50 creatinine 0.95.  WBC count is 10.7 

hemoglobin is 11.7.  Chest x-ray continues to show bilateral interstitial 

infiltrates consistent with COVID-19 pneumonia.  Patient is on multiple drips 

including Cleviprex at 3 mg per hour, he is on Cardizem at 15 mg per hour 

propofol at 50 fentanyl 3 mcg/kg/h Nimbex at 3 mcg/kg/m.  He is on 0.9 normal 

saline at KVO.  Yesterday the patient developed atrial fibrillation with RVR, 

and today I increased his Lovenox to 80 mg subcu twice a day, converted to sinus

rhythm and early this morning.  Patient is on enteral feeding.  It is up to 

goal.  Overall clinical status is extremely marginal.  Remains on cefepime 

empirically.  Remains on the COVID-19 cocktail.  Lovenox was increased to 80 mg 

subcu twice a day, Azopt Protonix 40 mg IV push daily.  And he is also on zinc. 

Lasix is given at 40 mg IV push every 12 hours.





Reevaluated today on 12/24/21, patient remains in the ICU intubated and 

mechanically ventilated.  Not showing much of an improvement over the last 24 

hours.  Remains on assist control rate of 32.  Tidal volume of 450 FiO2 70% PEEP

is still high/20.  ABG is marginal with a pO2 of 58 pCO2 of 69 pH of 7.39.  Vale

ent remains on propofol at 50 fentanyl 3 Nimbex at 2.5 , Cleviprex S3 milligrams

per hour.  His plateau pressure is 33 and peak airway pressure is in the high 

30s.  Dimer is 0.58 BUN is 48 creatinine 1.11.  There is 14.6 hemoglobin is 

12.1.  X-ray continues to show multifocal infiltrates consistent with COVID-19 

pneumonia.  Liver enzymes remain elevated with alkaline phosphatase of 149 ALT 

of 134 AST of 63.  Patient remains on Cardizem drip and he is now in sinus 

rhythm, his drip is at 5 mg per hour.  Remains on cefepime empirically.  Remains

on the COVID-19 cocktail.  Remains on Decadron 6 mg of push daily and Protonix 

as well as Lovenox 40 mg subcu daily.  Remains on Lasix 40 mg IV push twice a 

day.  He is also on lactulose 20 mg twice a day.  And Reglan was added today.  

No bowel movements in the last few days





Reevaluated today on 12/25/2021, patient remains in the ICU, intubated and 

mechanically ventilated.  Again he is not showing any signs of recovery or 

improvement.  Chest x-ray continues to show quite extensive infiltrates 

consistent with pneumonia and ARDS.  Remains on assist control rate of 32 tidal 

volume 450 FiO2 on the percent and PEEP of 20.  ABG showed a pO2 of 63 pCO2 of 

70 pH of 7.38.  Hence no changes were made on the ventilator settings and we 

plan to titrate FiO2 down to maintain O2 saturation above 89% if possible.  WBC 

count today is 14 hemoglobin is 11.3 sodium is 147 potassium 4.5 BUN is 51 

creatinine 1.08.  Liver enzymes are noted to be a bit elevated including AST of 

81 and ALT of 144.  Patient remains on multiple drips including fentanyl and 3 

mcg/kg/h Nimbex at 2 mcg/kg/m propofol at 50 mcg/kg/m Cleviprex at 4 mg per hour

Cardizem at 15 mg per hour and his IV fluid at KVO.  Patient is on enteral 

feeding.  Chest x-ray was reviewed and is basically about the same.  Up much of 

an improvement noted in the last few days





Reevaluated today on 12/26/2021, patient remains in the ICU, his overall 

clinical condition is very marginal at best.  Remains intubated and mechanically

ventilated.  He is on assist control rate of 32 tidal volume is 450 FiO2 of 100%

PEEP of 20 ABG showed a pO2 of 54 pCO2 of 70 pH of 7.38.  Patient remains 

sedated and paralyzed, he is on Nimbex at 2 propofol at 50 and fentanyl at 3 

mcg/kg per hour.  Patient is also on Cardizem drip at 10 mg per hour.  Receiving

vital AF at 17 mL per hour.  Chest x-ray continues to show bilateral 

infiltrates.  Patient seems to have better controlled rate with Cardizem at this

point.  Cardiology is seeing him for atrial fibrillation, and his metoprolol was

increased.  His overall pulmonary status is basically not showing much 

improvement hence I am recommending that we start placing the patient in prone 

position today.  Electrolytes are normal renal profile is normal bicarb is 38.  

Medications wise, patient remains on COVID-19 multivitamins including ascorbic 

acid and vitamin D, he is also on cefepime empirically as ordered by infectious 

disease on the case.  Patient is on Decadron 6 mg IV push twice a day, Lovenox 

80 mg subcu twice a day Reglan when necessary, levothyroxine, lactulose, 

metoprolol 50 mg 3 times a day vancomycin and zinc.  Patient had positive MSSA 

in the sputum.




















Objective





- Vital Signs


Vital signs: 


                                   Vital Signs











Temp  101.3 F H  12/26/21 04:00


 


Pulse  92   12/26/21 08:00


 


Resp  32 H  12/26/21 08:00


 


BP  129/73   12/26/21 08:00


 


Pulse Ox  86 L  12/26/21 08:00








                                 Intake & Output











 12/25/21 12/26/21 12/26/21





 18:59 06:59 18:59


 


Intake Total 2147.025 9416.614 506.388


 


Output Total 1850 1345 170


 


Balance -285.273 716.614 336.388


 


Intake:   


 


   876 46


 


    Cefepime 2 gm In Sodium 100 100 





    Chloride 0.9% 100 ml @ 25   





    mls/hr IVPB Q12HR [facility]   





    #:[id number]   


 


    Normal Saline 0.9 36 36 6





    Pressure Bag @ 3mL/hr   


 


    Sodium Chloride 0.9% 1, 240 240 40





    000 ml @ 20 mls/hr IV .   





    Q24H [facility] Rx#:[id number]   


 


    Vancomycin 2,500 mg In  500 





    Sodium Chloride 0.9% 500   





    ml 500 ml @ 167 mls/hr   





    IVPB ONCE ONE Rx#:   





    [id number]   


 


  Intake, IV Titration 928.727 951.614 426.388





  Amount   


 


    Cisatracurium 200 mg In  163.366 





    Sodium Chloride 0.9% 180   





    ml @ 1 MCG/KG/MIN 6.668   





    mls/hr IV .Q24H [facility] Rx#:   





    [id number]   


 


    Clevidipine Butyrate 25 69.333 62.600 37.467





    mg In Empty Bag 1 bag @ 1   





    MG/HR 2 mls/hr IV .Q24H   





    [facility] Rx#:[id number]   


 


    Diltiazem 125 mg In 195.25 49.667 





    Sodium Chloride 0.9% 100   





    ml @ 5 MG/HR 5 mls/hr IV   





    .Q24H [facility] Rx#:[id number]   


 


    fentaNYL (PF). 1,000 mcg 392.828 380.603 188.921





    In Sodium Chloride 0.9%   





    80 ml @ 0.5 MCG/KG/HR 5.   





    557 mls/hr IV .Q18H [facility]   





    Rx#:[id number]   


 


    propofoL 1,000 mg In 271.316 295.378 200





    Empty Bag 1 bag @ Titrate   





    IV .Q0M [facility] Rx#:   





    [id number]   


 


  Tube Feeding 170 204 34


 


  Other 90 30 


 


Output:   


 


  Urine 1850 1345 170


 


Other:   


 


  Voiding Method Indwelling Catheter Indwelling Catheter 








                       ABP, PAP, CO, CI - Last Documented











Arterial Blood Pressure        185/70

















- Exam





GENERAL EXAM: Revealed 56-year-old white male intubated mechanically ventilated,

sedated and paralyzed.


HEAD: Normocephalic/atraumatic.  Minimal inflammatory changes noted in the left 

side area of the face.


EYES: PERRLA, MI, nonicteric, no neck masses


NOSE: Clear with pink turbinates.


THROAT: No erythema or exudates.


NECK: No masses, no JVD, no thyroid enlargement, no adenopathy.


CHEST: Symmetrical chest expansion, crackles at the bases.


CVS: Regular rate and rhythm, normal S1 and S2, no gallops, no murmurs, no rubs


ABDOMEN: Soft, nontender.  No hepatosplenomegaly, normal bowel sounds, no 

guarding or rigidity.


EXTREMITIES: No clubbing, trace of bipedal edema, no cyanosis, 2+ pulses and 

upper and lower extremities.


SKIN: No rashes


CENTRAL NERVOUS SYSTEM: Sedated, paralyzed, unable to assess.








- Labs


CBC & Chem 7: 


                                 12/25/21 03:25





                                 12/26/21 05:55


Labs: 


                  Abnormal Lab Results - Last 24 Hours (Table)











  12/25/21 12/25/21 12/25/21 Range/Units





  12:16 17:30 17:30 


 


ABG pCO2     (35-45)  mmHg


 


ABG pO2     ()  mmHg


 


ABG HCO3     (21-25)  mmol/L


 


ABG Total CO2     (19-24)  mmol/L


 


ABG O2 Saturation     (94-97)  %


 


Sodium     (137-145)  mmol/L


 


Carbon Dioxide     (22-30)  mmol/L


 


BUN     (9-20)  mg/dL


 


Glucose     (74-99)  mg/dL


 


POC Glucose (mg/dL)  154 H    (75-99)  mg/dL


 


Ferritin   625.0 H   (22.0-322.0)  ng/mL


 


ALT     (4-49)  U/L


 


Alkaline Phosphatase     ()  U/L


 


C-Reactive Protein   3.8 H   (<1.0)  mg/dL


 


Total Protein     (6.3-8.2)  g/dL


 


Albumin     (3.5-5.0)  g/dL


 


Procalcitonin    0.15 H  (0.02-0.09)  ng/mL


 


Urine Protein     (Negative)  














  12/25/21 12/25/21 12/26/21 Range/Units





  17:36 23:28 05:34 


 


ABG pCO2     (35-45)  mmHg


 


ABG pO2     ()  mmHg


 


ABG HCO3     (21-25)  mmol/L


 


ABG Total CO2     (19-24)  mmol/L


 


ABG O2 Saturation     (94-97)  %


 


Sodium     (137-145)  mmol/L


 


Carbon Dioxide     (22-30)  mmol/L


 


BUN     (9-20)  mg/dL


 


Glucose     (74-99)  mg/dL


 


POC Glucose (mg/dL)  159 H  129 H  135 H  (75-99)  mg/dL


 


Ferritin     (22.0-322.0)  ng/mL


 


ALT     (4-49)  U/L


 


Alkaline Phosphatase     ()  U/L


 


C-Reactive Protein     (<1.0)  mg/dL


 


Total Protein     (6.3-8.2)  g/dL


 


Albumin     (3.5-5.0)  g/dL


 


Procalcitonin     (0.02-0.09)  ng/mL


 


Urine Protein     (Negative)  














  12/26/21 12/26/21 12/26/21 Range/Units





  05:55 05:58 09:06 


 


ABG pCO2   70 H   (35-45)  mmHg


 


ABG pO2   54 L*   ()  mmHg


 


ABG HCO3   41 H*   (21-25)  mmol/L


 


ABG Total CO2   43 H   (19-24)  mmol/L


 


ABG O2 Saturation   88.1 L   (94-97)  %


 


Sodium  146 H    (137-145)  mmol/L


 


Carbon Dioxide  38 H    (22-30)  mmol/L


 


BUN  53 H    (9-20)  mg/dL


 


Glucose  144 H    (74-99)  mg/dL


 


POC Glucose (mg/dL)     (75-99)  mg/dL


 


Ferritin     (22.0-322.0)  ng/mL


 


ALT  111 H    (4-49)  U/L


 


Alkaline Phosphatase  133 H    ()  U/L


 


C-Reactive Protein  3.3 H    (<1.0)  mg/dL


 


Total Protein  6.1 L    (6.3-8.2)  g/dL


 


Albumin  2.8 L    (3.5-5.0)  g/dL


 


Procalcitonin     (0.02-0.09)  ng/mL


 


Urine Protein    Trace H  (Negative)  














Assessment and Plan


Assessment: 





Impression:


Acute hypoxic respiratory failure secondary COVID-19 pneumonia, patient received

monoclonal antibody infusion on 12/7, was not a candidate for Remdesivir the 

cause of his acute hypoxic respiratory failure, not a candidate for Baricitinib 

because of underlying bacterial infection possibility.  Patient was admitted to 

the ICU on 12/16 and intubated on 12/16 remains intubated and mechanically 

ventilated.  Patient developed ARDS secondary to COVID-19 pneumonia with 

relatively elevated plateau pressures and static pressures.  Remains on high 

PEEP. high FiO2, 100% FiO2 and PEEP of 20 hence we'll start placing the patient 

again in prone position today.  That is about the only choice to hopefully 

ventilated the patient and oxygenated the patient better.


Elevated pro calcitonin level, patient was treated empirically with Zosyn 

cultures have been nondiagnostic.  Seen by infectious disease for fever and 

placed on cefepime and vancomycin.  Blood cultures are negative.  Sputum 

cultures are positive for MSSA.


Elevated inflammatory markers second COVID-19 pneumonia


Elevated d-dimer but negative workup for pulmonary embolism


New-onset atrial fibrillation with RVR on 12/22 requiring Cardizem drip and now 

on higher dose of Lovenox.  80 mg subcu twice a day.


History of fibromyalgia.


Lifetime nonsmoker.


Acute kidney injury secondary COVID-19 pneumonia improving


History of hypothyroidism


Deep tissue injury to Chin from prone position, no more prone positioning has 

been done.


Pneumomediastinum with subcutaneous emphysema in the neck.  This is a 

complication of COVID-19 infection.





Recommendation: Continue ventilatory support, remains on high PEEP of 20, FiO2 

is high at 100% rate is 32 volume 450.  Will place the patient today again in 

prone position and see if tolerated.  And decide whether to pursue this if the 

patient improves.


Continue sedation and paralysis


Continue fentanyl


Continue Lasix 40 mg twice a day


Continue antibiotics as per infectious disease on the case.


Continue clevidipine, and titrate accordingly.


Continue enteral feeding/nutritional support.


Continue lactulose, and Reglan


Continue GI and DVT prophylaxis.  Patient is on Protonix and is also on 

Lovenox/therapeutic Lovenox.


Overall prognosis remains extremely poor and guarded


Continue COVID-19 cocktail.


If no improvement in the next 24-48 hours, may have to discuss with the family 

the option of comfort care measures.


Critical care time is over 30 minutes





Time with Patient: Greater than 30

## 2021-12-27 VITALS — RESPIRATION RATE: 24 BRPM | HEART RATE: 88 BPM

## 2021-12-27 RX ADMIN — MORPHINE SULFATE PRN MG: 4 INJECTION, SOLUTION INTRAMUSCULAR; INTRAVENOUS at 00:34

## 2021-12-27 RX ADMIN — MORPHINE SULFATE PRN MG: 4 INJECTION, SOLUTION INTRAMUSCULAR; INTRAVENOUS at 00:16

## 2022-05-15 NOTE — P.DS
Providers


Date of admission: 


21 00:16





Expected date of discharge: 21


Attending physician: 


Cy Carrillo MD





Consults: 





                                        





21 23:46


Consult Physician Urgent 


   Consulting Provider: Bennett Chirinos


   Consult Reason/Comments: covid


   Do you want consulting provider notified?: Yes





21 20:51


Consult Physician Routine 


   Consulting Provider: Prosper Martinez


   Consult Reason/Comments: NOS Afib  RVR


   Do you want consulting provider notified?: Yes





21 15:13


Consult Physician Routine 


   Consulting Provider: Caryl Jain


   Consult Reason/Comments: COVID pneumonia with possibel superimposed bacterial


   Do you want consulting provider notified?: Yes











Primary care physician: 


Jamie Royt








- Discharge Diagnosis(es)


(1) Pneumonia due to COVID-19 virus


Status: Acute   


Hospital Course: 


                                                                               

Death summary 


56-year-old  male with past medical history of fibromyalgia comes in to

emergency room with symptoms of nausea, vomiting, dizziness and sweating feeling

weak in with runny nose loss of taste and hence now for the past 1 week.  He 

presented on  was found to have positive COVID results.  Received his 

monoclonal antibodies. X-ray suggestive of bilateral patchy pneumonia.  Both 

interstitial and airspace infiltrate. Patient placed on Lovenox 40 subcu twice a

day, 


 He continues to have dyspnea and was  on 15 L high flow nasal cannula and 

nonrebreather with pulse ox of 87 and 95%.  and we started on Zosyn and 

vancomycin for bacterial pneumonia coverage.    He wasfollowed by pulmonary 

medicine.  Patient is not a candidate for Baricitinib.  Patient is continued on 

Decadron, Lovenox and vitamin supplements. Patient was transferred into the 

intensive care unit on   and subsequently intubated and placed on 

mechanical ventilation, tidal volume 450, FiO2 100, PEEP of 20.  Patient was 

prone  and   was started on tube feedings . CT angiogram negative for PE, 

patient is on propofol, fentanyl and index vital HP, at 20 mL an hour . patient 

went into A. fib RVR  on . Patient continued to remain on  assist control 

respiratory rate of 32 tidal 1950 FiO2 100 was in PEEP of 20.  He remains 

sedated and paralyzed on Nimbex, propofol and fentanyl drip.  Cardizem drip and 

metoprolol was adjusted.  Patient passed away on  on 00: 48 am .  Please 

follow nurse's notes for details and time of death





Death diagnosis


1. Acute hypoxic respiratory failure secondary to Covid 19 pneumonia.  





2. Acute COVID pneumonia. 





3. Acute transaminitis





4. CKD stage 3.  





5. History of fibromyalgia. 





6. Insomnia.





7. Hypothyroidism.





8.  Hypertension.





9.atrial fibrillation with RVR 








Patient Condition at Discharge: Serious





Plan - Discharge Summary


Discharge Rx Participant: Yes


New Discharge Prescriptions: 


No Action


   Gabapentin 800 mg PO TID


   Citalopram Hydrobromide 40 mg PO DAILY


   traMADol HCl [Ultram] 100 mg PO Q6HR PRN


     PRN Reason: Pain


   Levothyroxine Sodium [Synthroid] 50 mcg PO DAILY


   Eszopiclone 3 mg PO HS PRN


     PRN Reason: Insomnia


Discharge Medication List





Citalopram Hydrobromide 40 mg PO DAILY 21 [History]


Eszopiclone 3 mg PO HS PRN 21 [History]


Gabapentin 800 mg PO TID 21 [History]


Levothyroxine Sodium [Synthroid] 50 mcg PO DAILY 21 [History]


traMADol HCl [Ultram] 100 mg PO Q6HR PRN 21 [History]








Follow up Appointment(s)/Referral(s): 


Jamie Underwood MD [Primary Care Provider] - 1-2 days


Discharge Disposition: 





- Preliminary Cause of Death


Preliminary Cause of Death: COVID pneumonia PAST SURGICAL HISTORY:  No significant past surgical history     No significant past surgical history

## 2023-02-09 NOTE — ED
General Adult HPI





- General


Chief complaint: Shortness of Breath


Stated complaint: Covid+,SOB


Time Seen by Provider: 12/08/21 23:27


Source: patient, EMS, RN notes reviewed


Mode of arrival: EMS


Limitations: no limitations





- History of Present Illness


Initial comments: 


56-year-old male presents emergency Department with chief complaint of shortness

of breath.  Patient seen here last night for covid 19.  Patient did receive 

monoclonal antibodies seemed to worsen today.  Patient found to be hypoxic.  

Patient states that he feels that it he feels that he needs oxygen.  Patient was

given O2 by EMS which improved his pulse ox..  Patient continued to have fever 

chills cough congestion.








- Related Data


                                Home Medications











 Medication  Instructions  Recorded  Confirmed


 


Cholecalciferol [Vitamin D3] 1,000 unit PO DAILY 12/11/15 12/15/15


 


DULoxetine HCL [Cymbalta] 20 mg PO QAM 12/11/15 12/15/15


 


Gabapentin [Neurontin] 100 mg PO TID 12/11/15 12/15/15








                                  Previous Rx's











 Medication  Instructions  Recorded


 


Docusate [Colace] 100 mg PO BID #60 capsule 12/16/15


 


Hydrocodone/Acetaminophen [Norco 1 - 2 each PO Q6HR PRN #90 tab 12/16/15





5-325]  


 


Warfarin [Coumadin] 2.5 mg PO DAILY #27 tab 12/16/15











                                    Allergies











Allergy/AdvReac Type Severity Reaction Status Date / Time


 


No Known Allergies Allergy   Verified 12/08/21 23:31














Review of Systems


ROS Statement: 


Those systems with pertinent positive or pertinent negative responses have been 

documented in the HPI.





ROS Other: All systems not noted in ROS Statement are negative.





Past Medical History


Past Medical History: Fibromyalgia


Additional Past Medical History / Comment(s): insomnia


History of Any Multi-Drug Resistant Organisms: None Reported


Past Surgical History: Orthopedic Surgery


Additional Past Surgical History / Comment(s): LEFT ACL REPAIR.


Past Anesthesia/Blood Transfusion Reactions: No Reported Reaction


Past Psychological History: Depression


Smoking Status: Never smoker


Past Alcohol Use History: None Reported


Past Drug Use History: None Reported





- Past Family History


  ** Father


Family Medical History: Coronary Artery Disease (CAD), Hypertension





General Exam


Limitations: no limitations


General appearance: alert, in no apparent distress


Head exam: Present: atraumatic, normocephalic, normal inspection


Eye exam: Present: normal appearance, PERRL, EOMI.  Absent: scleral icterus, 

conjunctival injection, periorbital swelling


ENT exam: Present: normal exam, mucous membranes moist


Neck exam: Present: normal inspection.  Absent: tenderness, meningismus, 

lymphadenopathy


Respiratory exam: Present: respiratory distress, decreased breath sounds.  

Absent: normal lung sounds bilaterally, wheezes, rales, rhonchi, stridor


Cardiovascular Exam: Present: regular rate, normal rhythm, normal heart sounds. 

 Absent: systolic murmur, diastolic murmur, rubs, gallop, clicks





Course


                                   Vital Signs











  12/08/21 12/08/21





  23:31 23:35


 


Temperature 97.6 F 


 


Pulse Rate 80 


 


Respiratory 18 





Rate  


 


Blood Pressure 133/82 


 


O2 Sat by Pulse 78 L 85 L





Oximetry  














Medical Decision Making





- Medical Decision Making





Patiently admitted for COVID-19 hypoxia





- Lab Data


Result diagrams: 


                                 12/08/21 23:57





                                   Lab Results











  12/08/21 Range/Units





  23:57 


 


WBC  6.6  (3.8-10.6)  k/uL


 


RBC  4.45  (4.30-5.90)  m/uL


 


Hgb  13.9  (13.0-17.5)  gm/dL


 


Hct  41.8  (39.0-53.0)  %


 


MCV  93.9  (80.0-100.0)  fL


 


MCH  31.2  (25.0-35.0)  pg


 


MCHC  33.2  (31.0-37.0)  g/dL


 


RDW  13.9  (11.5-15.5)  %


 


Plt Count  230  (150-450)  k/uL


 


MPV  8.3  


 


Neutrophils %  92  %


 


Lymphocytes %  5  %


 


Monocytes %  2  %


 


Eosinophils %  0  %


 


Basophils %  0  %


 


Neutrophils #  6.1  (1.3-7.7)  k/uL


 


Lymphocytes #  0.3 L  (1.0-4.8)  k/uL


 


Monocytes #  0.1  (0-1.0)  k/uL


 


Eosinophils #  0.0  (0-0.7)  k/uL


 


Basophils #  0.0  (0-0.2)  k/uL














Disposition


Clinical Impression: 


 COVID-19, Hypoxia, Pneumonia due to COVID-19 virus





Disposition: ADMITTED AS IP TO THIS [facility]


Condition: Serious


Referrals: 


Jamie Underwood MD [Primary Care Provider] - 1-2 days


Time of Disposition: 23:45 Female